# Patient Record
Sex: FEMALE | Race: WHITE | NOT HISPANIC OR LATINO | Employment: OTHER | URBAN - METROPOLITAN AREA
[De-identification: names, ages, dates, MRNs, and addresses within clinical notes are randomized per-mention and may not be internally consistent; named-entity substitution may affect disease eponyms.]

---

## 2017-02-03 RX ORDER — HYDROCHLOROTHIAZIDE 12.5 MG/1
CAPSULE, GELATIN COATED ORAL
Qty: 90 CAPSULE | Refills: 0 | Status: SHIPPED | OUTPATIENT
Start: 2017-02-03 | End: 2017-05-02 | Stop reason: SDUPTHER

## 2017-02-24 RX ORDER — LEVOTHYROXINE SODIUM 0.07 MG/1
TABLET ORAL
Qty: 90 TABLET | Refills: 0 | Status: SHIPPED | OUTPATIENT
Start: 2017-02-24 | End: 2017-05-23 | Stop reason: SDUPTHER

## 2017-05-02 RX ORDER — HYDROCHLOROTHIAZIDE 12.5 MG/1
CAPSULE, GELATIN COATED ORAL
Qty: 90 CAPSULE | Refills: 1 | Status: SHIPPED | OUTPATIENT
Start: 2017-05-02 | End: 2017-10-29 | Stop reason: SDUPTHER

## 2017-05-23 RX ORDER — LEVOTHYROXINE SODIUM 0.07 MG/1
TABLET ORAL
Qty: 90 TABLET | Refills: 0 | Status: SHIPPED | OUTPATIENT
Start: 2017-05-23 | End: 2017-08-21 | Stop reason: SDUPTHER

## 2017-08-21 RX ORDER — LEVOTHYROXINE SODIUM 0.07 MG/1
TABLET ORAL
Qty: 90 TABLET | Refills: 1 | Status: SHIPPED | OUTPATIENT
Start: 2017-08-21 | End: 2021-10-25 | Stop reason: SDUPTHER

## 2017-09-15 ENCOUNTER — CLINICAL SUPPORT (OUTPATIENT)
Dept: FAMILY MEDICINE CLINIC | Facility: CLINIC | Age: 77
End: 2017-09-15

## 2017-09-15 DIAGNOSIS — Z23 IMMUNIZATION DUE: Primary | ICD-10-CM

## 2017-09-15 PROCEDURE — 90715 TDAP VACCINE 7 YRS/> IM: CPT | Performed by: FAMILY MEDICINE

## 2017-09-15 PROCEDURE — 90471 IMMUNIZATION ADMIN: CPT | Performed by: FAMILY MEDICINE

## 2017-10-30 RX ORDER — HYDROCHLOROTHIAZIDE 12.5 MG/1
CAPSULE, GELATIN COATED ORAL
Qty: 90 CAPSULE | Refills: 1 | Status: SHIPPED | OUTPATIENT
Start: 2017-10-30 | End: 2019-05-01 | Stop reason: SDUPTHER

## 2018-02-18 RX ORDER — LEVOTHYROXINE SODIUM 0.07 MG/1
TABLET ORAL
Qty: 90 TABLET | Refills: 1 | OUTPATIENT
Start: 2018-02-18

## 2018-03-09 ENCOUNTER — TRANSCRIBE ORDERS (OUTPATIENT)
Dept: ADMINISTRATIVE | Facility: HOSPITAL | Age: 78
End: 2018-03-09

## 2018-03-09 DIAGNOSIS — E04.1 NONTOXIC UNINODULAR GOITER: Primary | ICD-10-CM

## 2018-03-13 ENCOUNTER — HOSPITAL ENCOUNTER (OUTPATIENT)
Dept: ULTRASOUND IMAGING | Facility: HOSPITAL | Age: 78
Discharge: HOME | End: 2018-03-13
Attending: OTOLARYNGOLOGY | Admitting: OTOLARYNGOLOGY

## 2018-03-13 ENCOUNTER — HOSPITAL ENCOUNTER (OUTPATIENT)
Dept: ULTRASOUND IMAGING | Facility: HOSPITAL | Age: 78
Discharge: HOME | End: 2018-03-13
Attending: OTOLARYNGOLOGY

## 2018-03-13 DIAGNOSIS — E04.1 NONTOXIC UNINODULAR GOITER: ICD-10-CM

## 2018-03-13 PROCEDURE — 76536 US EXAM OF HEAD AND NECK: CPT

## 2018-06-05 ENCOUNTER — OFFICE VISIT (OUTPATIENT)
Dept: FAMILY MEDICINE CLINIC | Facility: CLINIC | Age: 78
End: 2018-06-05

## 2018-06-05 VITALS
WEIGHT: 172 LBS | DIASTOLIC BLOOD PRESSURE: 68 MMHG | RESPIRATION RATE: 16 BRPM | OXYGEN SATURATION: 98 % | HEART RATE: 64 BPM | SYSTOLIC BLOOD PRESSURE: 120 MMHG | TEMPERATURE: 98.1 F | BODY MASS INDEX: 30.47 KG/M2

## 2018-06-05 DIAGNOSIS — N39.41 URGE INCONTINENCE: ICD-10-CM

## 2018-06-05 DIAGNOSIS — E03.9 HYPOTHYROIDISM, UNSPECIFIED TYPE: ICD-10-CM

## 2018-06-05 DIAGNOSIS — Z00.00 MEDICARE ANNUAL WELLNESS VISIT, SUBSEQUENT: Primary | ICD-10-CM

## 2018-06-05 PROCEDURE — G0439 PPPS, SUBSEQ VISIT: HCPCS | Performed by: FAMILY MEDICINE

## 2018-06-05 NOTE — PROGRESS NOTES
QUICK REFERENCE INFORMATION:  The ABCs of the Annual Wellness Visit    Subsequent Medicare Wellness Visit    HEALTH RISK ASSESSMENT    1940    Recent Hospitalizations:  No hospitalization(s) within the last year..        Current Medical Providers:  KATELYN Pederson MD - Family Practice      Smoking Status:  History   Smoking Status   • Former Smoker   Smokeless Tobacco   • Never Used       Alcohol Consumption:  History   Alcohol Use No       Depression Screen:   PHQ-2/PHQ-9 Depression Screening 6/5/2018   Little interest or pleasure in doing things 0   Feeling down, depressed, or hopeless 0   Total Score 0       Health Habits and Functional and Cognitive Screening:  Functional & Cognitive Status 6/5/2018   Do you have difficulty preparing food and eating? No   Do you have difficulty bathing yourself, getting dressed or grooming yourself? No   Do you have difficulty using the toilet? No   Do you have difficulty moving around from place to place? Yes   Do you have trouble with steps or getting out of a bed or a chair? No   In the past year have you fallen or experienced a near fall? Yes   Current Diet Well Balanced Diet   Dental Exam Up to date   Eye Exam Not up to date   Exercise (times per week) 3 times per week   Current Exercise Activities Include Cardiovasular Workout on Exercise Equipment   Do you need help using the phone?  No   Are you deaf or do you have serious difficulty hearing?  Yes   Do you need help with transportation? No   Do you need help shopping? No   Do you need help preparing meals?  No   Do you need help with housework?  No   Do you need help with laundry? No   Do you need help taking your medications? No   Do you need help managing money? No   Do you ever drive or ride in a car without wearing a seat belt? No           Does the patient have evidence of cognitive impairment? Yes    Aspirin use counseling: Taking ASA appropriately as indicated      Recent Lab Results:  CMP:  Lab Results    Component Value Date    BUN 27 (H) 06/30/2014    CREATININE 1.4 (H) 06/30/2014     06/30/2014    K 4.7 06/30/2014    CO2 30 06/30/2014    CALCIUM 10.2 06/30/2014    ALBUMIN 4.1 06/30/2014    BILITOT 0.4 06/30/2014    ALKPHOS 76 06/30/2014    AST 23 06/30/2014    ALT 13 06/30/2014     Lipid Panel:  Lab Results   Component Value Date    TRIG 110 06/02/2015    HDL 63 (H) 06/02/2015     HbA1c:       Visual Acuity:  No exam data present    Age-appropriate Screening Schedule:  Refer to the list below for future screening recommendations based on patient's age, sex and/or medical conditions. Orders for these recommended tests are listed in the plan section. The patient has been provided with a written plan.    Health Maintenance   Topic Date Due   • ZOSTER VACCINE (1 of 2) 07/26/1990   • PNEUMOCOCCAL VACCINES (65+ LOW/MEDIUM RISK) (1 of 2 - PCV13) 07/26/2005   • MAMMOGRAM  05/10/2016   • COLONOSCOPY  05/10/2016   • INFLUENZA VACCINE  08/01/2018   • TDAP/TD VACCINES (2 - Td) 09/15/2027        Subjective   History of Present Illness    Maya Anand is a 77 y.o. female who presents for an Subsequent Wellness Visit.    The following portions of the patient's history were reviewed and updated as appropriate: allergies, current medications, past family history, past medical history, past social history, past surgical history and problem list.    Outpatient Medications Prior to Visit   Medication Sig Dispense Refill   • DULoxetine (CYMBALTA) 30 MG capsule Take 1 capsule by mouth daily. 30 capsule 2   • hydrochlorothiazide (MICROZIDE) 12.5 MG capsule TAKE 1 CAPSULE DAILY 90 capsule 1   • levothyroxine (SYNTHROID, LEVOTHROID) 75 MCG tablet TAKE 1 TABLET DAILY AS DIRECTED (NEED APPOINTMENT) 90 tablet 1   • nitrofurantoin, macrocrystal-monohydrate, (MACROBID) 100 MG capsule Take 1 capsule by mouth 2 (Two) Times a Day. 14 capsule 0   • sulfamethoxazole-trimethoprim (BACTRIM DS,SEPTRA DS) 800-160 MG per tablet Take 1 tablet  by mouth 2 (two) times a day. 14 tablet 0     Facility-Administered Medications Prior to Visit   Medication Dose Route Frequency Provider Last Rate Last Dose   • gentamicin (GARAMYCIN) injection 40 mg  40 mg Intramuscular Q12H Josh Pederson MD   40 mg at 05/10/16 1049       There is no problem list on file for this patient.      Advance Care Planning:  has an advance directive - a copy HAS NOT been provided    Identification of Risk Factors:  Risk factors include: weight , increased fall risk, hearing limitations and incontinence.    Review of Systems   Constitutional: Negative.    HENT: Negative.    Eyes: Negative for visual disturbance.   Respiratory: Negative.    Cardiovascular: Negative.    Gastrointestinal: Negative.    Genitourinary: Positive for difficulty urinating.   Musculoskeletal: Negative for arthralgias and back pain.   Skin: Negative.    Neurological: Negative for dizziness and headaches.   Hematological: Negative.    Psychiatric/Behavioral: Negative.        Compared to one year ago, the patient feels her physical health is the same.  Compared to one year ago, the patient feels her mental health is the same.    Objective     Physical Exam   Constitutional: She appears well-developed and well-nourished.   Moves slowly   HENT:   Right Ear: External ear normal. Decreased hearing is noted.   Left Ear: External ear normal. Decreased hearing is noted.   Mouth/Throat: Oropharynx is clear and moist.   Eyes: EOM are normal.   Neck: Neck supple. Carotid bruit is not present. No thyromegaly present.   Cardiovascular: Normal heart sounds.    Pulmonary/Chest: Breath sounds normal.   Abdominal: Soft. Bowel sounds are normal.   Musculoskeletal: She exhibits no edema.   Lymphadenopathy:     She has no cervical adenopathy.   Neurological: She is alert.   Skin: Skin is warm.   Psychiatric: She has a normal mood and affect.   Vitals reviewed.      Vitals:    06/05/18 0847   BP: 120/68   Pulse: 64   Resp: 16    Temp: 98.1 °F (36.7 °C)   SpO2: 98%   Weight: 78 kg (172 lb)       Patient's Body mass index is 30.47 kg/m². BMI is above normal parameters. Recommendations include: educational material and exercise counseling.      Assessment/Plan   Patient Self-Management and Personalized Health Advice  The patient has been provided with information about: diet, exercise and fall prevention and preventive services including:   · Fall Risk assessment done.    Visit Diagnoses:    ICD-10-CM ICD-9-CM   1. Medicare annual wellness visit, subsequent Z00.00 V70.0   2. Hypothyroidism, unspecified type E03.9 244.9   3. Urge incontinence N39.41 788.31       No orders of the defined types were placed in this encounter.      Outpatient Encounter Prescriptions as of 6/5/2018   Medication Sig Dispense Refill   • DULoxetine (CYMBALTA) 30 MG capsule Take 1 capsule by mouth daily. 30 capsule 2   • hydrochlorothiazide (MICROZIDE) 12.5 MG capsule TAKE 1 CAPSULE DAILY 90 capsule 1   • levothyroxine (SYNTHROID, LEVOTHROID) 75 MCG tablet TAKE 1 TABLET DAILY AS DIRECTED (NEED APPOINTMENT) 90 tablet 1   • Mirabegron ER (MYRBETRIQ) 25 MG tablet sustained-release 24 hour 24 hr tablet Take 1 tablet by mouth Daily. 30 tablet 0   • [DISCONTINUED] nitrofurantoin, macrocrystal-monohydrate, (MACROBID) 100 MG capsule Take 1 capsule by mouth 2 (Two) Times a Day. 14 capsule 0   • [DISCONTINUED] sulfamethoxazole-trimethoprim (BACTRIM DS,SEPTRA DS) 800-160 MG per tablet Take 1 tablet by mouth 2 (two) times a day. 14 tablet 0     Facility-Administered Encounter Medications as of 6/5/2018   Medication Dose Route Frequency Provider Last Rate Last Dose   • [DISCONTINUED] gentamicin (GARAMYCIN) injection 40 mg  40 mg Intramuscular Q12H Josh Pederson MD   40 mg at 05/10/16 1049       Reviewed use of high risk medication in the elderly: yes  Reviewed for potential of harmful drug interactions in the elderly: yes    Follow Up:  Return for Annual.     An After  Visit Summary and PPPS with all of these plans were given to the patient.

## 2018-06-25 ENCOUNTER — TELEPHONE (OUTPATIENT)
Dept: FAMILY MEDICINE CLINIC | Facility: CLINIC | Age: 78
End: 2018-06-25

## 2018-06-25 DIAGNOSIS — N39.41 URGE INCONTINENCE: ICD-10-CM

## 2018-06-25 NOTE — TELEPHONE ENCOUNTER
----- Message from Jordyn Barragan MA sent at 6/25/2018  8:52 AM EDT -----      ----- Message -----  From: Molly Gil  Sent: 6/25/2018   8:50 AM  To: [facility] [facility]    PATIENT IS NEEDING A RX FOR Mirabegron ER (MYRBETRIQ) 25 MG tablet sustained-release 24 hour 24 hr tablet. WAS GIVEN SAMPLES BUT IS ALMOST OUT.     PLEASE SEND TO [facility] IN [place].     Rx has been sent into pharmacy.  BBvirginie, CMA

## 2018-06-27 DIAGNOSIS — N39.41 URGE INCONTINENCE: ICD-10-CM

## 2018-10-26 DIAGNOSIS — N39.41 URGE INCONTINENCE: ICD-10-CM

## 2018-10-26 RX ORDER — MIRABEGRON 25 MG/1
TABLET, FILM COATED, EXTENDED RELEASE ORAL
Qty: 30 TABLET | Refills: 3 | Status: SHIPPED | OUTPATIENT
Start: 2018-10-26 | End: 2018-10-30 | Stop reason: SDUPTHER

## 2018-10-30 ENCOUNTER — TELEPHONE (OUTPATIENT)
Dept: FAMILY MEDICINE CLINIC | Facility: CLINIC | Age: 78
End: 2018-10-30

## 2018-10-30 DIAGNOSIS — N39.41 URGE INCONTINENCE: ICD-10-CM

## 2018-10-30 NOTE — TELEPHONE ENCOUNTER
----- Message from Ngozi Duane sent at 10/30/2018  9:27 AM EDT -----  Regarding: PHARMACY MED CHANGE  Contact: [phone]  Patient called and said Dr Pederson called in a prescription for her yesterday for Myrbetriq 25mg, [facility].  Please call and let her know if this is possible @ [phone].  Thank you.     Rx has been resent to mail order pharmacy.  Brit, CMA

## 2018-11-15 ENCOUNTER — OFFICE VISIT (OUTPATIENT)
Dept: FAMILY MEDICINE CLINIC | Facility: CLINIC | Age: 78
End: 2018-11-15

## 2018-11-15 VITALS
SYSTOLIC BLOOD PRESSURE: 136 MMHG | HEART RATE: 76 BPM | OXYGEN SATURATION: 99 % | WEIGHT: 170 LBS | BODY MASS INDEX: 30.11 KG/M2 | TEMPERATURE: 98 F | RESPIRATION RATE: 16 BRPM | DIASTOLIC BLOOD PRESSURE: 70 MMHG

## 2018-11-15 DIAGNOSIS — F41.9 ANXIETY: Primary | ICD-10-CM

## 2018-11-15 PROCEDURE — 99213 OFFICE O/P EST LOW 20 MIN: CPT | Performed by: FAMILY MEDICINE

## 2018-11-15 RX ORDER — DIAZEPAM 5 MG/1
5 TABLET ORAL EVERY 8 HOURS PRN
Qty: 40 TABLET | Refills: 1 | Status: SHIPPED | OUTPATIENT
Start: 2018-11-15 | End: 2021-05-07

## 2018-11-15 NOTE — PROGRESS NOTES
Subjective   Maya Anand is a 78 y.o. female.     History of Present Illness   Did not like Cymbalta.  Preferred taking 's Valium to keep her calm, effects lasted one day.    No swelling, no cough or chest congestion. Rare heartburn helped with Pepto Bismol, brought on with excess eating.   Trouble swallowing, saw ENT and thyroid not enlarged on ultrasound.   The following portions of the patient's history were reviewed and updated as appropriate: allergies, current medications, past family history, past medical history, past social history, past surgical history and problem list.    Review of Systems   Constitutional: Negative for activity change.   HENT: Negative for congestion and sore throat.    Respiratory: Negative.    Cardiovascular: Negative.    Neurological: Negative.        Objective   Physical Exam   Constitutional: She appears well-developed.   Neck: No thyromegaly present.   Cardiovascular: Normal heart sounds.   Pulmonary/Chest: Breath sounds normal.   Lymphadenopathy:     She has no cervical adenopathy.   Neurological: She is alert.   Vitals reviewed.      Assessment/Plan   Maya was seen today for hypothyroidism and hypertension.    Diagnoses and all orders for this visit:    Anxiety  -     diazePAM (VALIUM) 5 MG tablet; Take 1 tablet by mouth Every 8 (Eight) Hours As Needed for Anxiety.

## 2019-05-01 RX ORDER — HYDROCHLOROTHIAZIDE 12.5 MG/1
12.5 CAPSULE, GELATIN COATED ORAL DAILY
Qty: 30 CAPSULE | Refills: 0 | Status: SHIPPED | OUTPATIENT
Start: 2019-05-01 | End: 2021-10-25

## 2021-01-21 ENCOUNTER — IMMUNIZATION (OUTPATIENT)
Dept: VACCINE CLINIC | Facility: HOSPITAL | Age: 81
End: 2021-01-21

## 2021-01-21 PROCEDURE — 0001A: CPT | Performed by: INTERNAL MEDICINE

## 2021-01-21 PROCEDURE — 91300 HC SARSCOV02 VAC 30MCG/0.3ML IM: CPT | Performed by: INTERNAL MEDICINE

## 2021-02-10 ENCOUNTER — IMMUNIZATION (OUTPATIENT)
Dept: VACCINE CLINIC | Facility: HOSPITAL | Age: 81
End: 2021-02-10

## 2021-02-10 PROCEDURE — 91300 HC SARSCOV02 VAC 30MCG/0.3ML IM: CPT | Performed by: INTERNAL MEDICINE

## 2021-02-10 PROCEDURE — 0002A: CPT | Performed by: INTERNAL MEDICINE

## 2021-03-20 PROCEDURE — 87077 CULTURE AEROBIC IDENTIFY: CPT | Performed by: FAMILY MEDICINE

## 2021-03-20 PROCEDURE — 87086 URINE CULTURE/COLONY COUNT: CPT | Performed by: FAMILY MEDICINE

## 2021-03-20 PROCEDURE — 87186 SC STD MICRODIL/AGAR DIL: CPT | Performed by: FAMILY MEDICINE

## 2021-03-23 ENCOUNTER — TELEPHONE (OUTPATIENT)
Dept: URGENT CARE | Facility: CLINIC | Age: 81
End: 2021-03-23

## 2021-03-24 ENCOUNTER — TELEPHONE (OUTPATIENT)
Dept: URGENT CARE | Facility: CLINIC | Age: 81
End: 2021-03-24

## 2021-03-24 NOTE — TELEPHONE ENCOUNTER
Informed patient of results.  Advised to finish medication and follow up with pcp if symptoms persist.  Feeling much better since visit.  FF 3/24/21

## 2021-05-07 ENCOUNTER — LAB (OUTPATIENT)
Dept: LAB | Facility: HOSPITAL | Age: 81
End: 2021-05-07

## 2021-05-07 ENCOUNTER — OFFICE VISIT (OUTPATIENT)
Dept: FAMILY MEDICINE CLINIC | Facility: CLINIC | Age: 81
End: 2021-05-07

## 2021-05-07 VITALS
DIASTOLIC BLOOD PRESSURE: 72 MMHG | SYSTOLIC BLOOD PRESSURE: 124 MMHG | BODY MASS INDEX: 28.19 KG/M2 | HEART RATE: 82 BPM | HEIGHT: 62 IN | OXYGEN SATURATION: 98 % | WEIGHT: 153.2 LBS

## 2021-05-07 DIAGNOSIS — I10 ESSENTIAL HYPERTENSION: ICD-10-CM

## 2021-05-07 DIAGNOSIS — R41.3 MEMORY LOSS: Primary | ICD-10-CM

## 2021-05-07 DIAGNOSIS — R41.3 MEMORY LOSS: ICD-10-CM

## 2021-05-07 DIAGNOSIS — E03.9 ACQUIRED HYPOTHYROIDISM: ICD-10-CM

## 2021-05-07 DIAGNOSIS — N39.41 URGE INCONTINENCE OF URINE: ICD-10-CM

## 2021-05-07 PROBLEM — M17.9 OSTEOARTHRITIS OF KNEE: Status: ACTIVE | Noted: 2021-05-07

## 2021-05-07 LAB
ALBUMIN SERPL-MCNC: 4 G/DL (ref 3.5–5.2)
ALBUMIN/GLOB SERPL: 1.4 G/DL
ALP SERPL-CCNC: 68 U/L (ref 39–117)
ALT SERPL W P-5'-P-CCNC: 16 U/L (ref 1–33)
ANION GAP SERPL CALCULATED.3IONS-SCNC: 9.1 MMOL/L (ref 5–15)
AST SERPL-CCNC: 29 U/L (ref 1–32)
BASOPHILS # BLD AUTO: 0.05 10*3/MM3 (ref 0–0.2)
BASOPHILS NFR BLD AUTO: 0.7 % (ref 0–1.5)
BILIRUB SERPL-MCNC: 0.4 MG/DL (ref 0–1.2)
BUN SERPL-MCNC: 29 MG/DL (ref 8–23)
BUN/CREAT SERPL: 18.6 (ref 7–25)
CALCIUM SPEC-SCNC: 11.2 MG/DL (ref 8.6–10.5)
CHLORIDE SERPL-SCNC: 101 MMOL/L (ref 98–107)
CO2 SERPL-SCNC: 26.9 MMOL/L (ref 22–29)
CREAT SERPL-MCNC: 1.56 MG/DL (ref 0.57–1)
DEPRECATED RDW RBC AUTO: 43.8 FL (ref 37–54)
EOSINOPHIL # BLD AUTO: 0.14 10*3/MM3 (ref 0–0.4)
EOSINOPHIL NFR BLD AUTO: 1.8 % (ref 0.3–6.2)
ERYTHROCYTE [DISTWIDTH] IN BLOOD BY AUTOMATED COUNT: 13.2 % (ref 12.3–15.4)
GFR SERPL CREATININE-BSD FRML MDRD: 32 ML/MIN/1.73
GLOBULIN UR ELPH-MCNC: 2.8 GM/DL
GLUCOSE SERPL-MCNC: 92 MG/DL (ref 65–99)
HCT VFR BLD AUTO: 35.6 % (ref 34–46.6)
HGB BLD-MCNC: 11.2 G/DL (ref 12–15.9)
IMM GRANULOCYTES # BLD AUTO: 0.03 10*3/MM3 (ref 0–0.05)
IMM GRANULOCYTES NFR BLD AUTO: 0.4 % (ref 0–0.5)
LYMPHOCYTES # BLD AUTO: 1.55 10*3/MM3 (ref 0.7–3.1)
LYMPHOCYTES NFR BLD AUTO: 20.4 % (ref 19.6–45.3)
MCH RBC QN AUTO: 28.7 PG (ref 26.6–33)
MCHC RBC AUTO-ENTMCNC: 31.5 G/DL (ref 31.5–35.7)
MCV RBC AUTO: 91.3 FL (ref 79–97)
MONOCYTES # BLD AUTO: 0.67 10*3/MM3 (ref 0.1–0.9)
MONOCYTES NFR BLD AUTO: 8.8 % (ref 5–12)
NEUTROPHILS NFR BLD AUTO: 5.17 10*3/MM3 (ref 1.7–7)
NEUTROPHILS NFR BLD AUTO: 67.9 % (ref 42.7–76)
NRBC BLD AUTO-RTO: 0 /100 WBC (ref 0–0.2)
PLATELET # BLD AUTO: 275 10*3/MM3 (ref 140–450)
PMV BLD AUTO: 11.1 FL (ref 6–12)
POTASSIUM SERPL-SCNC: 4.3 MMOL/L (ref 3.5–5.2)
PROT SERPL-MCNC: 6.8 G/DL (ref 6–8.5)
RBC # BLD AUTO: 3.9 10*6/MM3 (ref 3.77–5.28)
SODIUM SERPL-SCNC: 137 MMOL/L (ref 136–145)
T4 FREE SERPL-MCNC: 1.55 NG/DL (ref 0.93–1.7)
TSH SERPL DL<=0.05 MIU/L-ACNC: 0.85 UIU/ML (ref 0.27–4.2)
VIT B12 BLD-MCNC: 1098 PG/ML (ref 211–946)
WBC # BLD AUTO: 7.61 10*3/MM3 (ref 3.4–10.8)

## 2021-05-07 PROCEDURE — 99214 OFFICE O/P EST MOD 30 MIN: CPT | Performed by: FAMILY MEDICINE

## 2021-05-07 PROCEDURE — 85025 COMPLETE CBC W/AUTO DIFF WBC: CPT

## 2021-05-07 PROCEDURE — 84443 ASSAY THYROID STIM HORMONE: CPT

## 2021-05-07 PROCEDURE — 84439 ASSAY OF FREE THYROXINE: CPT

## 2021-05-07 PROCEDURE — 82607 VITAMIN B-12: CPT

## 2021-05-07 PROCEDURE — 80053 COMPREHEN METABOLIC PANEL: CPT

## 2021-05-07 RX ORDER — MULTIPLE VITAMINS W/ MINERALS TAB 9MG-400MCG
1 TAB ORAL DAILY
COMMUNITY
End: 2021-10-25

## 2021-05-07 RX ORDER — ACYCLOVIR 400 MG/1
400 TABLET ORAL
COMMUNITY
End: 2021-10-25 | Stop reason: SDUPTHER

## 2021-05-07 RX ORDER — MULTIPLE VITAMINS W/ MINERALS TAB 9MG-400MCG
1 TAB ORAL DAILY
COMMUNITY
End: 2021-07-07

## 2021-05-07 RX ORDER — ESCITALOPRAM OXALATE 5 MG/1
5 TABLET ORAL DAILY
COMMUNITY
End: 2021-10-25 | Stop reason: SDUPTHER

## 2021-05-07 NOTE — PROGRESS NOTES
"Chief Complaint  Establish Care and Memory Loss (daughter states that she is concerned about memory)    Subjective          Maya Anand presents to [facility]  History of Present Illness     Daughter also present at appointment.     Patient has no concerns, \"I'm good.\" She is slowing down. She has to be careful walking. She has to take Synthroid every day. She has had thyroid problems for a long time, as long as daughter can remember. All of her siblings have it and daughters both have thyroid problems.     Daughter is concerned about her memory. She takes prevagen daily. She visited her in [place]. She couldn't remember how to turn coffee pot on. Paying bill at dinner, used credit card, couldn't add her tip. Her balance at beach one day standing in waves had a hard time standing up. She has a hard time finding word she wants to say, or skips over a word. 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 Sister had hydrocephalus.     She just got over UTI about a month ago.     She gets swollen ankles.     Once a month acid reflux and takes TUMS.         Objective   Vital Signs:   /72 (BP Location: Left arm, Patient Position: Sitting, Cuff Size: Adult)   Pulse 82   Ht 157.5 cm (62\")   Wt 69.5 kg (153 lb 3.2 oz)   SpO2 98%   BMI 28.02 kg/m²     Physical Exam  Vitals reviewed.   Constitutional:       General: She is not in acute distress.     Appearance: She is obese. She is not ill-appearing.   Neck:      Thyroid: No thyroid mass, thyromegaly or thyroid tenderness.   Cardiovascular:      Rate and Rhythm: Normal rate and regular rhythm.   Pulmonary:      Effort: Pulmonary effort is normal.      Breath sounds: Normal breath sounds.   Musculoskeletal:      Right lower leg: No edema.      Left lower leg: No edema.   Neurological:      Mental Status: She is alert.   Psychiatric:         Mood and Affect: Mood normal.         Behavior: Behavior " normal.         Thought Content: Thought content normal.         Cognition and Memory: She exhibits impaired recent memory.         Judgment: Judgment normal.        Result Review :               Prior PCP records requested.  Assessment and Plan    Diagnoses and all orders for this visit:    1. Memory loss (Primary)  -     TSH; Future  -     T4, free; Future  -     CBC Auto Differential; Future  -     Comprehensive Metabolic Panel; Future  -     Vitamin B12; Future  -     MRI Brain Without Contrast; Future  -     Ambulatory Referral to Neurology  New.  MMSE 27 out of 30.  She had difficulty with naming clipboard and stethoscope.  She also was unable to complete 3 word recall.  Check labs and imaging and refer to memory clinic for further evaluation, testing, and management.  2. Essential hypertension  -     Comprehensive Metabolic Panel; Future  Controlled with hydrochlorothiazide.  Discussed occasional edema would recommend elevating legs.  If severe 2-3+ edema needs evaluation.  3. Urge incontinence of urine  Has been under the care of urology.  Continue Myrbetriq.  4. Acquired hypothyroidism  -     TSH; Future  -     T4, free; Future  Check thyroid function today and adjust levothyroxine if needed.      Follow Up   Return in about 4 months (around 9/1/2021) for Office visit chronic care.  Patient was given instructions and counseling regarding her condition or for health maintenance advice. Please see specific information pulled into the AVS if appropriate.     Electronically signed by Ysabel Stewart MD, 05/07/21, 11:23 AM EDT.

## 2021-05-10 ENCOUNTER — TELEPHONE (OUTPATIENT)
Dept: FAMILY MEDICINE CLINIC | Facility: CLINIC | Age: 81
End: 2021-05-10

## 2021-05-10 DIAGNOSIS — N18.9 CHRONIC KIDNEY DISEASE, UNSPECIFIED CKD STAGE: Primary | ICD-10-CM

## 2021-05-10 NOTE — TELEPHONE ENCOUNTER
Attempted to contact patient, she is unable to hear over the phone. I relayed message to her . He alerted his wife and he states that she does not currently have a nephrologist. They are requesting a referral to be placed.

## 2021-05-10 NOTE — TELEPHONE ENCOUNTER
Please call about abnormal result.  Letter will also be mailed.      The test results show normal thyroid function.  Mild anemia.  Kidney function is severely reduced.  Normal B12 level.  Do you see a kidney specialist yet?  If you are not under the care of nephrology I recommend consultation and I can place a referral.

## 2021-05-11 ENCOUNTER — TELEPHONE (OUTPATIENT)
Dept: FAMILY MEDICINE CLINIC | Facility: CLINIC | Age: 81
End: 2021-05-11

## 2021-05-11 RX ORDER — HYDROXYZINE PAMOATE 25 MG/1
25-50 CAPSULE ORAL EVERY 4 HOURS PRN
Qty: 10 CAPSULE | Refills: 0 | Status: SHIPPED | OUTPATIENT
Start: 2021-05-11 | End: 2021-10-25

## 2021-05-11 NOTE — TELEPHONE ENCOUNTER
Pts daughter called asking if there was any type of medication that could be proscribed to her mother to help with anxiety and claustrophobia due to her MRI scan. She said that her mother is very worried about being in an MRI machine.

## 2021-06-07 ENCOUNTER — HOSPITAL ENCOUNTER (OUTPATIENT)
Dept: MRI IMAGING | Facility: HOSPITAL | Age: 81
Discharge: HOME | End: 2021-06-07
Admitting: FAMILY MEDICINE

## 2021-06-07 DIAGNOSIS — R41.3 MEMORY LOSS: ICD-10-CM

## 2021-06-07 PROCEDURE — 70551 MRI BRAIN STEM W/O DYE: CPT

## 2021-07-07 ENCOUNTER — OFFICE VISIT (OUTPATIENT)
Dept: NEUROLOGY | Facility: CLINIC | Age: 81
End: 2021-07-07

## 2021-07-07 VITALS
SYSTOLIC BLOOD PRESSURE: 142 MMHG | BODY MASS INDEX: 27.23 KG/M2 | OXYGEN SATURATION: 96 % | HEIGHT: 62 IN | HEART RATE: 75 BPM | DIASTOLIC BLOOD PRESSURE: 70 MMHG | TEMPERATURE: 97.3 F | WEIGHT: 148 LBS

## 2021-07-07 DIAGNOSIS — G31.84 MILD COGNITIVE IMPAIRMENT: Primary | ICD-10-CM

## 2021-07-07 PROCEDURE — 99204 OFFICE O/P NEW MOD 45 MIN: CPT | Performed by: PHYSICIAN ASSISTANT

## 2021-07-07 RX ORDER — AMLODIPINE BESYLATE 2.5 MG/1
2.5 TABLET ORAL DAILY
COMMUNITY
Start: 2021-06-11 | End: 2021-10-25 | Stop reason: DRUGHIGH

## 2021-07-07 RX ORDER — FUROSEMIDE 20 MG/1
20 TABLET ORAL DAILY PRN
COMMUNITY
Start: 2021-06-11 | End: 2021-10-25

## 2021-07-07 RX ORDER — DONEPEZIL HYDROCHLORIDE 5 MG/1
5 TABLET, FILM COATED ORAL DAILY
Qty: 30 TABLET | Refills: 11 | Status: SHIPPED | OUTPATIENT
Start: 2021-07-07 | End: 2022-01-26

## 2021-07-13 DIAGNOSIS — R26.89 IMPAIRMENT OF BALANCE: Primary | ICD-10-CM

## 2021-07-22 ENCOUNTER — TRANSCRIBE ORDERS (OUTPATIENT)
Dept: ADMINISTRATIVE | Facility: HOSPITAL | Age: 81
End: 2021-07-22

## 2021-07-22 DIAGNOSIS — N25.81 HYPERPARATHYROIDISM, SECONDARY (HCC): Primary | ICD-10-CM

## 2021-08-12 PROCEDURE — 87186 SC STD MICRODIL/AGAR DIL: CPT | Performed by: NURSE PRACTITIONER

## 2021-08-12 PROCEDURE — 87086 URINE CULTURE/COLONY COUNT: CPT | Performed by: NURSE PRACTITIONER

## 2021-08-12 PROCEDURE — 87077 CULTURE AEROBIC IDENTIFY: CPT | Performed by: NURSE PRACTITIONER

## 2021-08-13 ENCOUNTER — TELEPHONE (OUTPATIENT)
Dept: URGENT CARE | Facility: CLINIC | Age: 81
End: 2021-08-13

## 2021-08-14 ENCOUNTER — TELEPHONE (OUTPATIENT)
Dept: URGENT CARE | Facility: CLINIC | Age: 81
End: 2021-08-14

## 2021-10-25 ENCOUNTER — OFFICE VISIT (OUTPATIENT)
Dept: FAMILY MEDICINE CLINIC | Facility: CLINIC | Age: 81
End: 2021-10-25

## 2021-10-25 ENCOUNTER — HOME HEALTH ADMISSION (OUTPATIENT)
Dept: HOME HEALTH SERVICES | Facility: HOME HEALTHCARE | Age: 81
End: 2021-10-25

## 2021-10-25 VITALS
HEIGHT: 62 IN | DIASTOLIC BLOOD PRESSURE: 86 MMHG | HEART RATE: 67 BPM | OXYGEN SATURATION: 98 % | WEIGHT: 146.2 LBS | SYSTOLIC BLOOD PRESSURE: 158 MMHG | BODY MASS INDEX: 26.91 KG/M2

## 2021-10-25 DIAGNOSIS — M25.562 ACUTE PAIN OF LEFT KNEE: ICD-10-CM

## 2021-10-25 DIAGNOSIS — R26.9 ABNORMAL GAIT DUE TO MUSCLE WEAKNESS: ICD-10-CM

## 2021-10-25 DIAGNOSIS — F33.41 MAJOR DEPRESSIVE DISORDER, RECURRENT EPISODE, IN PARTIAL REMISSION (HCC): ICD-10-CM

## 2021-10-25 DIAGNOSIS — E03.9 ACQUIRED HYPOTHYROIDISM: ICD-10-CM

## 2021-10-25 DIAGNOSIS — N39.41 URGE INCONTINENCE OF URINE: ICD-10-CM

## 2021-10-25 DIAGNOSIS — B00.1 HERPES LABIALIS: ICD-10-CM

## 2021-10-25 DIAGNOSIS — M62.81 ABNORMAL GAIT DUE TO MUSCLE WEAKNESS: ICD-10-CM

## 2021-10-25 DIAGNOSIS — E21.3 HYPERPARATHYROIDISM (HCC): ICD-10-CM

## 2021-10-25 DIAGNOSIS — F41.1 GAD (GENERALIZED ANXIETY DISORDER): ICD-10-CM
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
## 2021-10-25 DIAGNOSIS — I10 ESSENTIAL HYPERTENSION: Primary | ICD-10-CM

## 2021-10-25 DIAGNOSIS — N39.41 URGE INCONTINENCE: ICD-10-CM

## 2021-10-25 PROBLEM — H35.3132 INTERMEDIATE STAGE NONEXUDATIVE AGE-RELATED MACULAR DEGENERATION OF BOTH EYES: Status: ACTIVE | Noted: 2018-01-19

## 2021-10-25 PROBLEM — H04.123 DRY EYES, BILATERAL: Status: ACTIVE | Noted: 2018-08-01

## 2021-10-25 PROBLEM — H40.003 GLAUCOMA SUSPECT OF BOTH EYES: Status: ACTIVE | Noted: 2020-01-06

## 2021-10-25 PROBLEM — H52.203 ASTIGMATISM OF BOTH EYES: Status: ACTIVE | Noted: 2018-08-01

## 2021-10-25 PROBLEM — H52.4 BILATERAL PRESBYOPIA: Status: ACTIVE | Noted: 2018-08-01

## 2021-10-25 PROBLEM — H52.13 BILATERAL MYOPIA: Status: ACTIVE | Noted: 2018-08-01

## 2021-10-25 PROBLEM — H40.051 BORDERLINE GLAUCOMA OF RIGHT EYE WITH OCULAR HYPERTENSION: Status: ACTIVE | Noted: 2018-02-12

## 2021-10-25 PROBLEM — H26.492 PCO (POSTERIOR CAPSULAR OPACIFICATION), LEFT: Status: ACTIVE | Noted: 2021-09-13

## 2021-10-25 PROCEDURE — 99214 OFFICE O/P EST MOD 30 MIN: CPT | Performed by: FAMILY MEDICINE

## 2021-10-25 RX ORDER — AMLODIPINE BESYLATE 5 MG/1
5 TABLET ORAL DAILY
Qty: 90 TABLET | Refills: 1 | Status: SHIPPED | OUTPATIENT
Start: 2021-10-25 | End: 2022-04-18 | Stop reason: SDUPTHER

## 2021-10-25 RX ORDER — NITROFURANTOIN 25; 75 MG/1; MG/1
CAPSULE ORAL
COMMUNITY
Start: 2021-10-22 | End: 2022-07-11

## 2021-10-25 RX ORDER — ESCITALOPRAM OXALATE 5 MG/1
5 TABLET ORAL EVERY MORNING
Qty: 90 TABLET | Refills: 3 | Status: SHIPPED | OUTPATIENT
Start: 2021-10-25 | End: 2021-12-06 | Stop reason: DRUGHIGH

## 2021-10-25 RX ORDER — LEVOTHYROXINE SODIUM 0.07 MG/1
75 TABLET ORAL
Qty: 90 TABLET | Refills: 3 | Status: SHIPPED | OUTPATIENT
Start: 2021-10-25 | End: 2022-10-20

## 2021-10-25 RX ORDER — CEFDINIR 300 MG/1
1 CAPSULE ORAL 2 TIMES DAILY
COMMUNITY
Start: 2021-08-12 | End: 2021-12-06

## 2021-10-25 RX ORDER — ACYCLOVIR 400 MG/1
400 TABLET ORAL 3 TIMES DAILY
Qty: 270 TABLET | Refills: 3 | Status: SHIPPED | OUTPATIENT
Start: 2021-10-25 | End: 2022-12-08

## 2021-10-25 NOTE — PROGRESS NOTES
"Chief Complaint  Hypothyroidism (Pt  states that the pt has already had flu vaccine. ), Hypertension (Pt was at [facility] ED this past weekend for UTI, HTN, and a fall. ), Medication Problem (Pt daughter states that she would like to discuss medications today. ), and Difficulty Walking (Pt daughter would like to see about getting wheelchaird due to pt have trouble walking. )    Subjective          Maya Anand presents to [facility]  History of Present Illness      and daughter are also present at appointment.     Fall occurred 10/20/21 at home. Moved everything to the firtst floor. Recliner slipped out from under her when sitting down onto the floor.  couldn't help her from the floor, had to call family. 10/22/21 she couldn't get up with strength to lift off commode. Took to outside ED. Left knee xray w/o fracture. She had UTI. BP declan high 196. Daughter reviewed she wasn't taking her medications for a long time. Daughter is now taking over medications for past 3 days. Want to consolidate prescriptions and mail order for compliance. She was taken of HCTZ by nephrologist because of calcium. She hasn't taken lasix. She has had bad fever blisters for a couple months, she was taking acyclovir intermittently, now 3 times a day. Triggers are tomato. New this morning after eating ribs. She was taking lexapro as needed, now daily for anxiety/depression. She stopped donepezil. She was kind of taking levothyroxine. Frequent urination, wears pads. Nocturia x1. Difficulty leaving the home. She went to [facility] and had 5 weeks recommended by neurology.   She has already had flu vaccine. She will be switching nephrology to .   She didn't start medication for hyperparathyroid, she hasn't scan.     Objective   Vital Signs:   /86   Pulse 67   Ht 157.5 cm (62.01\")   Wt 66.3 kg (146 lb 3.2 oz)   SpO2 98%   BMI 26.73 kg/m²     Physical Exam  Vitals reviewed.   Constitutional: "       General: She is not in acute distress.     Appearance: She is not ill-appearing.   Cardiovascular:      Rate and Rhythm: Normal rate and regular rhythm.   Pulmonary:      Effort: Pulmonary effort is normal.      Breath sounds: Normal breath sounds.   Musculoskeletal:      Left knee: No swelling, deformity, effusion, erythema or ecchymosis. No tenderness.      Comments: Slow gait with walker.   Neurological:      Mental Status: She is alert.        Result Review :   The following data was reviewed by: Ysabel Stewart MD on 10/25/2021:             TSH (05/07/2021 11:27)  T4, free (05/07/2021 11:27)    Outside [place] emergency room note, x-ray, labs requested.    Assessment and Plan    Diagnoses and all orders for this visit:    1. Essential hypertension (Primary)  -     amLODIPine (NORVASC) 5 MG tablet; Take 1 tablet by mouth Daily.  Dispense: 90 tablet; Refill: 1  Uncontrolled.  Increase amlodipine 5 mg.  Recheck in 4 to 6 weeks.  2. Herpes labialis  -     acyclovir (ZOVIRAX) 400 MG tablet; Take 1 tablet by mouth 3 (Three) Times a Day.  Dispense: 270 tablet; Refill: 3  Continue suppression with acyclovir.  3. JOSE (generalized anxiety disorder)  -     escitalopram (LEXAPRO) 5 MG tablet; Take 1 tablet by mouth Every Morning.  Dispense: 90 tablet; Refill: 3  Continue Lexapro.  4. Major depressive disorder, recurrent episode, in partial remission (HCC)  -     escitalopram (LEXAPRO) 5 MG tablet; Take 1 tablet by mouth Every Morning.  Dispense: 90 tablet; Refill: 3  Continue Lexapro.  5. Acquired hypothyroidism  -     levothyroxine (SYNTHROID, LEVOTHROID) 75 MCG tablet; Take 1 tablet by mouth Every Morning.  Dispense: 90 tablet; Refill: 3  Most recent thyroid function in range continue levothyroxine.  6. Urge incontinence of urine    7. Urge incontinence  -     Mirabegron ER (Myrbetriq) 25 MG tablet sustained-release 24 hour 24 hr tablet; Take 1 tablet by mouth Daily.  Dispense: 90 tablet; Refill:  3    8. Abnormal gait due to muscle weakness  -     Ambulatory Referral to Home Health  -     Lightweight Wheelchair    9. Fall, subsequent encounter  -     Ambulatory Referral to Home Health  -     Lightweight Wheelchair    10. Acute pain of left knee  -     Ambulatory Referral to Home Health  -     Lightweight Wheelchair  Recommend in-home physical therapy as she has difficulty leaving her home.  Obtain emergency room records including x-rays which were reportedly normal.  11. Hyperparathyroidism (HCC)  Family is seeking second opinion with  nephrology.  She is no longer on hydrochlorothiazide.      Follow Up   Return in about 6 weeks (around 12/6/2021) for Office visit HTN, knee pain .  Patient was given instructions and counseling regarding her condition or for health maintenance advice. Please see specific information pulled into the AVS if appropriate.   Electronically signed by Ysabel Stewart MD, 10/25/21, 10:40 AM EDT.

## 2021-10-26 ENCOUNTER — HOME HEALTH ADMISSION (OUTPATIENT)
Dept: HOME HEALTH SERVICES | Facility: HOME HEALTHCARE | Age: 81
End: 2021-10-26

## 2021-10-26 DIAGNOSIS — I10 ESSENTIAL HYPERTENSION: Primary | ICD-10-CM

## 2021-10-26 DIAGNOSIS — R41.3 MEMORY LOSS: ICD-10-CM

## 2021-10-28 ENCOUNTER — HOME CARE VISIT (OUTPATIENT)
Dept: HOME HEALTH SERVICES | Facility: HOME HEALTHCARE | Age: 81
End: 2021-10-28

## 2021-10-28 VITALS
OXYGEN SATURATION: 97 % | TEMPERATURE: 98.1 F | DIASTOLIC BLOOD PRESSURE: 62 MMHG | SYSTOLIC BLOOD PRESSURE: 147 MMHG | HEART RATE: 72 BPM | RESPIRATION RATE: 16 BRPM

## 2021-10-28 PROCEDURE — G0299 HHS/HOSPICE OF RN EA 15 MIN: HCPCS

## 2021-11-03 ENCOUNTER — HOME CARE VISIT (OUTPATIENT)
Dept: HOME HEALTH SERVICES | Facility: HOME HEALTHCARE | Age: 81
End: 2021-11-03

## 2021-11-03 VITALS
HEART RATE: 74 BPM | DIASTOLIC BLOOD PRESSURE: 75 MMHG | OXYGEN SATURATION: 93 % | RESPIRATION RATE: 16 BRPM | SYSTOLIC BLOOD PRESSURE: 138 MMHG

## 2021-11-03 PROCEDURE — G0299 HHS/HOSPICE OF RN EA 15 MIN: HCPCS

## 2021-11-10 ENCOUNTER — HOME CARE VISIT (OUTPATIENT)
Dept: HOME HEALTH SERVICES | Facility: HOME HEALTHCARE | Age: 81
End: 2021-11-10

## 2021-11-10 VITALS
OXYGEN SATURATION: 93 % | SYSTOLIC BLOOD PRESSURE: 142 MMHG | TEMPERATURE: 97.7 F | RESPIRATION RATE: 16 BRPM | HEART RATE: 74 BPM | DIASTOLIC BLOOD PRESSURE: 62 MMHG

## 2021-11-10 PROCEDURE — G0299 HHS/HOSPICE OF RN EA 15 MIN: HCPCS

## 2021-11-16 ENCOUNTER — TRANSCRIBE ORDERS (OUTPATIENT)
Dept: ADMINISTRATIVE | Facility: HOSPITAL | Age: 81
End: 2021-11-16

## 2021-11-16 DIAGNOSIS — E21.0 PRIMARY HYPERPARATHYROIDISM (HCC): ICD-10-CM

## 2021-11-16 DIAGNOSIS — E83.52 HYPERCALCEMIA: Primary | ICD-10-CM

## 2021-11-17 ENCOUNTER — HOME CARE VISIT (OUTPATIENT)
Dept: HOME HEALTH SERVICES | Facility: HOME HEALTHCARE | Age: 81
End: 2021-11-17

## 2021-11-17 VITALS
HEART RATE: 68 BPM | TEMPERATURE: 97.7 F | DIASTOLIC BLOOD PRESSURE: 66 MMHG | SYSTOLIC BLOOD PRESSURE: 133 MMHG | RESPIRATION RATE: 18 BRPM | OXYGEN SATURATION: 98 %

## 2021-11-17 PROCEDURE — G0299 HHS/HOSPICE OF RN EA 15 MIN: HCPCS

## 2021-12-06 ENCOUNTER — OFFICE VISIT (OUTPATIENT)
Dept: FAMILY MEDICINE CLINIC | Facility: CLINIC | Age: 81
End: 2021-12-06

## 2021-12-06 VITALS
WEIGHT: 144.2 LBS | TEMPERATURE: 97.8 F | OXYGEN SATURATION: 98 % | BODY MASS INDEX: 26.54 KG/M2 | SYSTOLIC BLOOD PRESSURE: 128 MMHG | HEIGHT: 62 IN | HEART RATE: 72 BPM | DIASTOLIC BLOOD PRESSURE: 76 MMHG

## 2021-12-06 DIAGNOSIS — Z12.31 VISIT FOR SCREENING MAMMOGRAM: ICD-10-CM

## 2021-12-06 DIAGNOSIS — Z00.00 MEDICARE ANNUAL WELLNESS VISIT, SUBSEQUENT: Primary | ICD-10-CM

## 2021-12-06 DIAGNOSIS — R41.3 MEMORY LOSS: ICD-10-CM

## 2021-12-06 DIAGNOSIS — E21.3 HYPERPARATHYROIDISM (HCC): ICD-10-CM

## 2021-12-06 DIAGNOSIS — F41.1 GAD (GENERALIZED ANXIETY DISORDER): ICD-10-CM

## 2021-12-06 DIAGNOSIS — F32.5 MAJOR DEPRESSION IN REMISSION (HCC): ICD-10-CM

## 2021-12-06 DIAGNOSIS — Z23 IMMUNIZATION DUE: ICD-10-CM

## 2021-12-06 DIAGNOSIS — E03.9 ACQUIRED HYPOTHYROIDISM: ICD-10-CM

## 2021-12-06 DIAGNOSIS — B00.1 HERPES LABIALIS: ICD-10-CM

## 2021-12-06 DIAGNOSIS — I10 ESSENTIAL HYPERTENSION: Chronic | ICD-10-CM

## 2021-12-06 DIAGNOSIS — N39.41 URGE INCONTINENCE OF URINE: ICD-10-CM

## 2021-12-06 PROCEDURE — 1160F RVW MEDS BY RX/DR IN RCRD: CPT | Performed by: FAMILY MEDICINE

## 2021-12-06 PROCEDURE — G0439 PPPS, SUBSEQ VISIT: HCPCS | Performed by: FAMILY MEDICINE

## 2021-12-06 PROCEDURE — 99397 PER PM REEVAL EST PAT 65+ YR: CPT | Performed by: FAMILY MEDICINE

## 2021-12-06 PROCEDURE — 1126F AMNT PAIN NOTED NONE PRSNT: CPT | Performed by: FAMILY MEDICINE

## 2021-12-06 PROCEDURE — 1170F FXNL STATUS ASSESSED: CPT | Performed by: FAMILY MEDICINE

## 2021-12-06 RX ORDER — PHENAZOPYRIDINE HYDROCHLORIDE 200 MG/1
200 TABLET, FILM COATED ORAL DAILY
COMMUNITY
Start: 2021-08-12 | End: 2021-12-06

## 2021-12-06 RX ORDER — ESCITALOPRAM OXALATE 10 MG/1
10 TABLET ORAL EVERY MORNING
Qty: 90 TABLET | Refills: 3 | Status: SHIPPED | OUTPATIENT
Start: 2021-12-06 | End: 2022-09-08 | Stop reason: SDUPTHER

## 2021-12-06 NOTE — PROGRESS NOTES
The ABCs of the Annual Wellness Visit  Subsequent Medicare Wellness Visit    Chief Complaint   Patient presents with   • Medicare Wellness-subsequent     Pt states she has already had flu vacccine, will call [employer] and get dates.   • Anxiety     Pt daughter would like to talk about medications   • Urinary Frequency     Pt daughter would like to talk about medications   • Annual Exam      Subjective    History of Present Illness:  Maya Anand is a 81 y.o. female who presents for a Subsequent Medicare Wellness Visit.      Daughter Gabriella present at appointment.       She saw [facility]. Parathyroid scan scheduled this week.     Lips breakout with ketchup. Avoid acidic foods.     Sometimes notices mad at the dog. Daughter concerned about lexapro dose change.     Urinating about every 4 hours, up 2 times a night. She doesn't go as much during the day.         The following portions of the patient's history were reviewed and   updated as appropriate: allergies, current medications, past family history, past medical history, past social history, past surgical history and problem list.    Compared to one year ago, the patient feels her physical   health is worse.    Compared to one year ago, the patient feels her mental   health is worse.    Recent Hospitalizations:  She was not admitted to the hospital during the last year.       Current Medical Providers:  Patient Care Team:  Ysabel oWng MD as PCP - General (Family Medicine)  Jabari Gallegos MD as Consulting Physician (Nephrology)    Outpatient Medications Prior to Visit   Medication Sig Dispense Refill   • acyclovir (ZOVIRAX) 400 MG tablet Take 1 tablet by mouth 3 (Three) Times a Day. 270 tablet 3   • amLODIPine (NORVASC) 5 MG tablet Take 1 tablet by mouth Daily. 90 tablet 1   • Apoaequorin (Prevagen) 10 MG capsule Take  by mouth.     • cyanocobalamin (VITAMIN B-12) 500 MCG tablet Take 500 mcg by mouth Daily.     • donepezil  (ARICEPT) 5 MG tablet Take 1 tablet by mouth Daily. 30 tablet 11   • levothyroxine (SYNTHROID, LEVOTHROID) 75 MCG tablet Take 1 tablet by mouth Every Morning. 90 tablet 3   • Mirabegron ER (Myrbetriq) 25 MG tablet sustained-release 24 hour 24 hr tablet Take 1 tablet by mouth Daily. 90 tablet 3   • nitrofurantoin, macrocrystal-monohydrate, (MACROBID) 100 MG capsule      • escitalopram (LEXAPRO) 5 MG tablet Take 1 tablet by mouth Every Morning. 90 tablet 3   • cefdinir (OMNICEF) 300 MG capsule Take 1 capsule by mouth 2 (Two) Times a Day.     • phenazopyridine (PYRIDIUM) 200 MG tablet Take 200 mg by mouth Daily.       No facility-administered medications prior to visit.       No opioid medication identified on active medication list. I have reviewed chart for other potential  high risk medication/s and harmful drug interactions in the elderly.          Aspirin is not on active medication list.  Aspirin use is not indicated based on review of current medical condition/s. Risk of harm outweighs potential benefits.  .    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  ACP discussion was held with the patient during this visit. Patient has an advance directive (not in EMR), copy requested.          Objective    Vitals:    12/06/21 1151   BP:  "128/76   Pulse: 72   Temp: 97.8 °F (36.6 °C)   SpO2: 98%   Weight: 65.4 kg (144 lb 3.2 oz)   Height: 157.5 cm (62.01\")   PainSc: 0-No pain     BMI Readings from Last 1 Encounters:   21 26.37 kg/m²   BMI is above normal parameters. Recommendations include: nutrition counseling    Does the patient have evidence of cognitive impairment? Yes    Physical Exam  Constitutional:       General: She is not in acute distress.  HENT:      Ears:      Comments: Hearing aids  Eyes:      General:         Right eye: No discharge.         Left eye: No discharge.      Conjunctiva/sclera: Conjunctivae normal.   Neck:      Thyroid: No thyromegaly.   Cardiovascular:      Rate and Rhythm: Normal rate and regular rhythm.   Pulmonary:      Effort: Pulmonary effort is normal.      Breath sounds: Normal breath sounds.   Abdominal:      Palpations: Abdomen is soft.      Tenderness: There is no abdominal tenderness.   Musculoskeletal:      Cervical back: Neck supple.      Right lower leg: No edema.      Left lower leg: No edema.   Lymphadenopathy:      Head:      Right side of head: No submandibular, preauricular or posterior auricular adenopathy.      Left side of head: No submandibular, preauricular or posterior auricular adenopathy.      Cervical: No cervical adenopathy.   Skin:     General: Skin is warm and dry.   Neurological:      Mental Status: She is alert and oriented to person, place, and time.   Psychiatric:         Speech: Speech normal.         Behavior: Behavior is cooperative.         Cognition and Memory: Memory is impaired.                 HEALTH RISK ASSESSMENT    Smoking Status:  Social History     Tobacco Use   Smoking Status Former Smoker   • Packs/day: 0.25   • Years: 5.00   • Pack years: 1.25   • Types: Cigarettes   • Quit date:    • Years since quittin.9   Smokeless Tobacco Never Used     Alcohol Consumption:  Social History     Substance and Sexual Activity   Alcohol Use No     Fall Risk Screen:    YASMANYADI " Fall Risk Assessment was completed, and patient is at HIGH risk for falls. Assessment completed on:12/6/2021  HUMA Fall Risk Clinician Key Questions   Have you fallen in the past year?: Yes    Stay Idependant Patient Questions   Patient Fall Risk Assessment Score : 0      Depression Screening:  PHQ-2/PHQ-9 Depression Screening 12/6/2021   Little interest or pleasure in doing things 0   Feeling down, depressed, or hopeless 0   Total Score 0       Health Habits and Functional and Cognitive Screening:  Functional & Cognitive Status 12/6/2021   Do you have difficulty preparing food and eating? No   Do you have difficulty bathing yourself, getting dressed or grooming yourself? No   Do you have difficulty using the toilet? No   Do you have difficulty moving around from place to place? No   Do you have trouble with steps or getting out of a bed or a chair? Yes   Current Diet Well Balanced Diet   Dental Exam Up to date   Eye Exam Up to date   Exercise (times per week) 0 times per week   Current Exercises Include No Regular Exercise   Current Exercise Activities Include -   Do you need help using the phone?  No   Are you deaf or do you have serious difficulty hearing?  Yes   Do you need help with transportation? Yes   Do you need help shopping? Yes   Do you need help preparing meals?  No   Do you need help with housework?  Yes   Do you need help with laundry? No   Do you need help taking your medications? Yes   Do you need help managing money? No   Do you ever drive or ride in a car without wearing a seat belt? No   Have you felt unusual stress, anger or loneliness in the last month? Yes   Who do you live with? Spouse   If you need help, do you have trouble finding someone available to you? No   Have you been bothered in the last four weeks by sexual problems? No   Do you have difficulty concentrating, remembering or making decisions? Yes       Age-appropriate Screening Schedule:  Refer to the list below for future  screening recommendations based on patient's age, sex and/or medical conditions. Orders for these recommended tests are listed in the plan section. The patient has been provided with a written plan.    Health Maintenance   Topic Date Due   • DXA SCAN  Never done   • ZOSTER VACCINE (1 of 2) Never done   • MAMMOGRAM  Never done   • INFLUENZA VACCINE  08/01/2021   • TDAP/TD VACCINES (2 - Td or Tdap) 09/15/2027              Immunization History   Administered Date(s) Administered   • COVID-19 (PFIZER) 01/21/2021, 02/10/2021, 09/28/2021   • Fluzone High-Dose 65+yrs 05/30/2020, 06/30/2021   • Tdap 09/15/2017       Assessment/Plan   CMS Preventative Services Quick Reference  Risk Factors Identified During Encounter  Dementia/Memory   Fall Risk-High or Moderate  Immunizations Discussed/Encouraged (specific Immunizations; Influenza  Obesity/Overweight   The above risks/problems have been discussed with the patient.  Follow up actions/plans if indicated are seen below in the Assessment/Plan Section.  Pertinent information has been shared with the patient in the After Visit Summary.    Diagnoses and all orders for this visit:    1. Medicare annual wellness visit, subsequent (Primary)  Counseled on health maintenance topics and preventative care recommendations: diet  2. Essential hypertension  Blood pressure is improved today.  At last visit dose was increased to 5 mg but daughter is unsure whether she has still been taking the 2.5 mg dose.  She will check bottles at home and then contact the office with information.  3. JOSE (generalized anxiety disorder)  -     escitalopram (LEXAPRO) 10 MG tablet; Take 1 tablet by mouth Every Morning.  Dispense: 90 tablet; Refill: 3  Increase Lexapro to 10 mg daily.  Follow-up if not improving.  4. Memory loss  Keep appointment with neurology next month and may consider changing donepezil dosing.  5. Immunization due  Called pharmacy and patient received a flu vaccine in June which seems  unusual.  Will obtain full immunization records.  6. Major depression in remission (HCC)  -     escitalopram (LEXAPRO) 10 MG tablet; Take 1 tablet by mouth Every Morning.  Dispense: 90 tablet; Refill: 3  Continue Lexapro.  7. Urge incontinence of urine  Symptoms mostly controlled with Myrbetriq 25 mg.  Discussed if not improving to increase to 50 mg.  8. Visit for screening mammogram  Discussed screening mammogram and patient would like to have the outside facility at [place].  She plans on scheduling on her own.  9. Herpes labialis  Avoiding acidic food triggers.   10. Acquired hypothyroidism  Most recent thyroid function in range and recommend checking annually.  11. Hyperparathyroidism (HCC)  She has been followed by nephrology and has nuclear medicine scan.  Daughter reports that depending upon the results she may be starting a new medication.      Follow Up:   Return in about 6 months (around 6/6/2022) for Office visit HTN, anxiety, and , MWV and fasting labs.     An After Visit Summary and PPPS were made available to the patient.               Electronically signed by Ysabel Stewart MD, 12/06/21, 12:47 PM EST.

## 2021-12-06 NOTE — PATIENT INSTRUCTIONS
1. Please check for copy of Advanced Directive/ Living Will.  2. Increase lexapro to 10mg. You may take lexapro 5mg 2 pills once a day to use your existing prescription. New prescription for lexapro 10mg sent to the pharmacy.   3. Continue Mybetriq 25mg. If urination increases, can try higher dose Mybetriq 50mg.   4. Check home medication bottles for 2.5 mg or 5mg.  Goal <140/90. Continue current dose.   5. Check if still taking nitrofurantoin antibiotic.           Advance Care Planning and Advance Directives     You make decisions on a daily basis - decisions about where you want to live, your career, your home, your life. Perhaps one of the most important decisions you face is your choice for future medical care. Take time to talk with your family and your healthcare team and start planning today.  Advance Care Planning is a process that can help you:  · Understand possible future healthcare decisions in light of your own experiences  · Reflect on those decision in light of your goals and values  · Discuss your decisions with those closest to you and the healthcare professionals that care for you  · Make a plan by creating a document that reflects your wishes    Surrogate Decision Maker  In the event of a medical emergency, which has left you unable to communicate or to make your own decisions, you would need someone to make decisions for you.  It is important to discuss your preferences for medical treatment with this person while you are in good health.     Qualities of a surrogate decision maker:  • Willing to take on this role and responsibility  • Knows what you want for future medical care  • Willing to follow your wishes even if they don't agree with them  • Able to make difficult medical decisions under stressful circumstances    Advance Directives  These are legal documents you can create that will guide your healthcare team and decision maker(s) when needed. These documents can be stored in the  electronic medical record.    · Living Will - a legal document to guide your care if you have a terminal condition or a serious illness and are unable to communicate. States vary by statute in document names/types, but most forms may include one or more of the following:        -  Directions regarding life-prolonging treatments        -  Directions regarding artificially provided nutrition/hydration        -  Choosing a healthcare decision maker        -  Direction regarding organ/tissue donation    · Durable Power of  for Healthcare - this document names an -in-fact to make medical decisions for you, but it may also allow this person to make personal and financial decisions for you. Please seek the advice of an  if you need this type of document.    **Advance Directives are not required and no one may discriminate against you if you do not sign one.    Medical Orders  Many states allow specific forms/orders signed by your physician to record your wishes for medical treatment in your current state of health. This form, signed in personal communication with your physician, addresses resuscitation and other medical interventions that you may or may not want.      For more information or to schedule a time with a [facility] Advance Care Planning Facilitator contact: [facility] or call [phone] and someone will contact you directly.  You are due for Shingrix vaccination series ( the newest shingles vaccine).  It is a two shot series spaced 2-6 months apart. Please get this vaccine series started at your earliest convenience at your local pharmacy to help avoid shingles outbreak. It is more effective than the old Zostavax vaccine and is recommended even if you have had the Zostavax vaccine in the past.  Once the Shingrix series is completed, it does not need to be repeated.   For more information, please look at the website below:  [facility] Shingrix Vaccine Information      Medicare  Wellness  Personal Prevention Plan of Service     Date of Office Visit:    Encounter Provider:  Ysabel Stewart MD  Place of Service:  [facility]  Patient Name: Maya Anand  :  1940    As part of the Medicare Wellness portion of your visit today, we are providing you with this personalized preventive plan of services (PPPS). This plan is based upon recommendations of the [place] Preventive Services Task Force (USPSTF) and the Advisory Committee on Immunization Practices (ACIP).    This lists the preventive care services that should be considered, and provides dates of when you are due. Items listed as completed are up-to-date and do not require any further intervention.    Health Maintenance   Topic Date Due   • DXA SCAN  Never done   • COLORECTAL CANCER SCREENING  Never done   • ZOSTER VACCINE (1 of 2) Never done   • Pneumococcal Vaccine 65+ (1 of 1 - PPSV23) Never done   • MAMMOGRAM  Never done   • INFLUENZA VACCINE  2021   • ANNUAL WELLNESS VISIT  2022   • TDAP/TD VACCINES (2 - Td or Tdap) 09/15/2027   • COVID-19 Vaccine  Completed       No orders of the defined types were placed in this encounter.      No follow-ups on file.

## 2021-12-08 ENCOUNTER — HOSPITAL ENCOUNTER (OUTPATIENT)
Dept: NUCLEAR MEDICINE | Facility: HOSPITAL | Age: 81
Discharge: HOME | End: 2021-12-08

## 2021-12-08 DIAGNOSIS — E21.0 PRIMARY HYPERPARATHYROIDISM (HCC): ICD-10-CM

## 2021-12-08 DIAGNOSIS — E83.52 HYPERCALCEMIA: ICD-10-CM

## 2021-12-08 PROCEDURE — A9500 TC99M SESTAMIBI: HCPCS | Performed by: INTERNAL MEDICINE

## 2021-12-08 PROCEDURE — 0 TECHNETIUM SESTAMIBI: Performed by: INTERNAL MEDICINE

## 2021-12-08 PROCEDURE — 78070 PARATHYROID PLANAR IMAGING: CPT

## 2021-12-08 RX ADMIN — TECHNETIUM TC 99M SESTAMIBI 1 DOSE: 1 INJECTION INTRAVENOUS at 12:25

## 2022-01-26 ENCOUNTER — OFFICE VISIT (OUTPATIENT)
Dept: NEUROLOGY | Facility: CLINIC | Age: 82
End: 2022-01-26

## 2022-01-26 DIAGNOSIS — G31.84 MILD COGNITIVE IMPAIRMENT: Primary | ICD-10-CM

## 2022-01-26 PROCEDURE — 99214 OFFICE O/P EST MOD 30 MIN: CPT | Performed by: PHYSICIAN ASSISTANT

## 2022-01-26 RX ORDER — DONEPEZIL HYDROCHLORIDE 10 MG/1
10 TABLET, FILM COATED ORAL DAILY
Qty: 90 TABLET | Refills: 3 | Status: SHIPPED | OUTPATIENT
Start: 2022-01-26 | End: 2022-08-25 | Stop reason: SDUPTHER

## 2022-01-26 NOTE — PROGRESS NOTES
Subjective         Chief Complaint: memory loss      History of Present Illness   Maya Anand is a 81 y.o. female who returns to clinic today for evaluation of memory loss. She has noted symptoms for at least several years marked initially by forgetfulness and word-finding difficulties. This has worsened  over time. Additional symptoms have included impairments in executive function. There have been no associated symptoms of anxiety or depression. She denies impairments in ADL's. She manages her medications. She is currently residing with family.      Her family has noted balance impairment and a shuffling gait. This has worsened over time. She tends to lean forward after walking for longer distances. She denies any recent falls or additional associated neurologic symptoms.     Prior evaluation has included an MRI of the brain which was essentially unremarkable. Screening blood work was notable for kidney dysfunction, for which she recently established care with nephrology. She is currently taking Prevagen.     Today: Since her last visit in 7/21, she feels essentially unchanged.       I have reviewed and confirmed the past family, social and medical history as accurate on 1/26/22.     Review of Systems   Constitutional: Negative.    HENT: Negative.    Eyes: Negative.    Respiratory: Negative.    Cardiovascular: Negative.    Gastrointestinal: Negative.    Endocrine: Negative.    Genitourinary: Negative.    Musculoskeletal: Negative.    Skin: Negative.    Allergic/Immunologic: Negative.    Hematological: Negative.        Objective     General appearance today is normal.     Physical Exam  Neurological:      Mental Status: She is oriented to person, place, and time.      Coordination: Finger-Nose-Finger Test normal.      Deep Tendon Reflexes: Strength normal.   Psychiatric:         Speech: Speech normal.          Neurologic Exam     Mental Status   Oriented to person, place, and time.   Registration: recalls 3 of  3 objects. Recall at 5 minutes: recalls 1 of 3 objects. Follows 3 step commands.   Attention: normal.   Speech: speech is normal   Level of consciousness: alert  Able to name object. Able to read. Able to repeat. Able to write. Normal comprehension.     Cranial Nerves   Cranial nerves II through XII intact.     Motor Exam   Muscle bulk: normal  Overall muscle tone: normal    Strength   Strength 5/5 throughout.     Gait, Coordination, and Reflexes     Coordination   Finger to nose coordination: normal    Tremor   Resting tremor: absent        Results  MMSE=28      Assessment/Plan   Diagnoses and all orders for this visit:    1. Mild cognitive impairment (Primary)    Other orders  -     donepezil (Aricept) 10 MG tablet; Take 1 tablet by mouth Daily.  Dispense: 90 tablet; Refill: 3          Discussion/Summary   Maya Anand returns to clinic today for evaluation of Mild Cognitive Impairment (MCI) . I again reviewed her current status and treatment options. After discussing potential treatment options, it was elected to increase  donepezil to 10mg daily. She will then follow up in 6 months , or sooner if needed.   Total time of visit today: 30 minutes. As part of this visit I obtained additional history from the family which is incorporated in the HPI. I also discussed diagnosis, evaluation, current status, treatment options and management as discussed above.           Anjelica Vergara PA-C

## 2022-01-26 NOTE — PATIENT INSTRUCTIONS
Things discussed:   1- increase donepezil/aricept to 10mg daily.  2- follow up in 6 months or sooner if needed.      Mild Neurocognitive Disorder  Mild neurocognitive disorder, formerly known as mild cognitive impairment, is a disorder in which memory does not work as well as it should. This disorder may also cause problems with other mental functions, including thought, communication, behavior, and completion of tasks. These problems can be noticed and measured, but they usually do not interfere with daily activities or the ability to live independently.  Mild neurocognitive disorder typically develops after 60 years of age, but it can also develop at younger ages. It is not as serious as major neurocognitive disorder, also known as dementia, but it may be the first sign of it. Generally, symptoms of this condition get worse over time. In rare cases, symptoms can get better.  What are the causes?  This condition may be caused by:  · Brain disorders like Alzheimer's disease, Parkinson's disease, and other conditions that gradually damage nerve cells (neurodegenerative conditions).  · Diseases that affect blood vessels in the brain and result in small strokes.  · Certain infections, such as HIV.  · Traumatic brain injury.  · Other medical conditions, such as brain tumors, underactive thyroid (hypothyroidism), and vitamin B12 deficiency.  · Use of certain drugs or prescription medicines.  What increases the risk?  The following factors may make you more likely to develop this condition:  · Being older than 65 years.  · Being male.  · Low education level.  · Diabetes, high blood pressure, high cholesterol, and other conditions that increase the risk for blood vessel diseases.  · Untreated or undertreated sleep apnea.  · Having a certain type of gene that can be passed from parent to child (inherited).  · Chronic health problems such as heart disease, lung disease, liver disease, kidney disease, or depression.  What are  the signs or symptoms?  Symptoms of this condition include:  · Difficulty remembering. You may:  ? Forget names, phone numbers, or details of recent events.  ? Forget social events and appointments.  ? Repeatedly forget where you put your car keys or other items.  · Difficulty thinking and solving problems. You may have trouble with complex tasks, such as:  ? Paying bills.  ? Driving in unfamiliar places.  · Difficulty communicating. You may have trouble:  ? Finding the right word or naming an object.  ? Forming a sentence that makes sense, or understanding what you read or hear.  · Changes in your behavior or personality. When this happens, you may:  ? Lose interest in the things that you used to enjoy.  ? Withdraw from social situations.  ? Get angry more easily than usual.  ? Act before thinking.  How is this diagnosed?  This condition is diagnosed based on:  · Your symptoms. Your health care provider may ask you and the people you spend time with, such as family and friends, about your symptoms.  · Evaluation of mental functions (neuropsychological testing). Your health care provider may refer you to a neurologist or mental health specialist to evaluate your mental functions in detail.  To identify the cause of your condition, your health care provider may:  · Get a detailed medical history.  · Ask about use of alcohol, drugs, and prescription medicines.  · Do a physical exam.  · Order blood tests and brain imaging exams.  How is this treated?  Mild neurocognitive disorder that is caused by medicine use, drug use, infection, or another medical condition may improve when the cause is treated, or when medicines or drugs are stopped. If this disorder has another cause, it generally does not improve and may get worse. In these cases, the goal of treatment is to help you manage the loss of mental function. Treatments in these cases include:  · Medicine. Medicine mainly helps memory and behavior symptoms.  · Talk  therapy. Talk therapy provides education, emotional support, memory aids, and other ways of making up for problems with mental function.  · Lifestyle changes, including:  ? Getting regular exercise.  ? Eating a healthy diet that includes omega-3 fatty acids.  ? Challenging your thinking and memory skills.  ? Having more social interaction.  Follow these instructions at home:  Eating and drinking    · Drink enough fluid to keep your urine pale yellow.  · Eat a healthy diet that includes omega-3 fatty acids. These can be found in:  ? Fish.  ? Nuts.  ? Leafy vegetables.  ? Vegetable oils.  · If you drink alcohol:  ? Limit how much you use to:  § 0-1 drink a day for women.  § 0-2 drinks a day for men.  ? Be aware of how much alcohol is in your drink. In the [place] one drink equals one 12 oz bottle of beer (355 mL), one 5 oz glass of wine (148 mL), or one 1½ oz glass of hard liquor (44 mL).    Lifestyle    · Get regular exercise as told by your health care provider.  · Do not use any products that contain nicotine or tobacco, such as cigarettes, e-cigarettes, and chewing tobacco. If you need help quitting, ask your health care provider.  · Practice ways to manage stress. If you need help managing stress, ask your health care provider.  · Continue to have social interaction.  · Keep your mind active with stimulating activities you enjoy, such as reading or playing games.  · Make sure to get quality sleep. Follow these tips:  ? Avoid napping during the day.  ? Keep your sleeping area dark and cool.  ? Avoid exercising during the few hours before you go to bed.  ? Avoid caffeine products in the evening.    General instructions  · Take over-the-counter and prescription medicines only as told by your health care provider. Your health care provider may recommend that you avoid taking medicines that can affect thinking, such as pain medicines or sleep medicines.  · Work with your health care provider to find out what you need  help with and what your safety needs are.  · Keep all follow-up visits. This is important.  Where to find more information  · [place] on Aging: www.janette.nih.gov  Contact a health care provider if:  · You have any new symptoms.  Get help right away if:  · You develop new confusion or your confusion gets worse.  · You act in ways that place you or your family in danger.  Summary  · Mild neurocognitive disorder is a disorder in which memory does not work as well as it should.  · Mild neurocognitive disorder can have many causes. It may be the first stage of dementia.  · To manage your condition, get regular exercise, keep your mind active, get quality sleep, and eat a healthy diet.  This information is not intended to replace advice given to you by your health care provider. Make sure you discuss any questions you have with your health care provider.  Document Revised: 05/03/2021 Document Reviewed: 05/03/2021  Elsevier Patient Education © 2021 [employer]vier Inc[employer]

## 2022-02-09 RX ORDER — DONEPEZIL HYDROCHLORIDE 10 MG/1
10 TABLET, FILM COATED ORAL DAILY
Qty: 90 TABLET | Refills: 3 | OUTPATIENT
Start: 2022-02-09 | End: 2023-02-09

## 2022-02-27 DIAGNOSIS — N39.41 URGE INCONTINENCE: ICD-10-CM

## 2022-04-18 DIAGNOSIS — I10 ESSENTIAL HYPERTENSION: ICD-10-CM

## 2022-04-18 NOTE — TELEPHONE ENCOUNTER
Caller: Ann Moore    Relationship: Emergency Contact    Best call back number: [phone]    Requested Prescriptions:   Requested Prescriptions     Pending Prescriptions Disp Refills   • amLODIPine (NORVASC) 5 MG tablet 90 tablet 1     Sig: Take 1 tablet by mouth Daily.        Pharmacy where request should be sent: [facility] - [address] [phone] [facility] [phone] FX     Additional details provided by patient: PATIENT IS COMPLETELY OUT. SHE NEEDS HER PRESCRIPTION SENT TO [facility] BUT SHE ALSO IS REQUESTING A PARTIAL TO BE SENT TO     [id number] - [place], [place] - [address] [place] [address] AT [facility] - [phone] [place] [phone] FX     Does the patient have less than a 3 day supply:  [x] Yes  [] No    Janice Timmons Rep   04/18/22 14:16 EDT

## 2022-04-19 RX ORDER — AMLODIPINE BESYLATE 5 MG/1
5 TABLET ORAL DAILY
Qty: 90 TABLET | Refills: 1 | Status: SHIPPED | OUTPATIENT
Start: 2022-04-19 | End: 2022-04-22 | Stop reason: SDUPTHER

## 2022-04-19 NOTE — TELEPHONE ENCOUNTER
Rx Refill Note  Requested Prescriptions     Pending Prescriptions Disp Refills   • amLODIPine (NORVASC) 5 MG tablet 90 tablet 1     Sig: Take 1 tablet by mouth Daily.      Last office visit with prescribing clinician: 12/6/2021      Next office visit with prescribing clinician: 6/6/2022            Maddie Chang MA  04/19/22, 09:16 EDT

## 2022-04-22 ENCOUNTER — TELEPHONE (OUTPATIENT)
Dept: FAMILY MEDICINE CLINIC | Facility: CLINIC | Age: 82
End: 2022-04-22

## 2022-04-22 DIAGNOSIS — I10 ESSENTIAL HYPERTENSION: ICD-10-CM

## 2022-04-22 RX ORDER — AMLODIPINE BESYLATE 5 MG/1
5 TABLET ORAL DAILY
Qty: 10 TABLET | Refills: 0 | Status: SHIPPED | OUTPATIENT
Start: 2022-04-22 | End: 2022-07-11 | Stop reason: SDUPTHER

## 2022-04-22 NOTE — TELEPHONE ENCOUNTER
Caller: Paulette Siddiqui    Relationship to patient: Emergency Contact    Best call back number: [phone]    Patient is needing: PAULETTE STATED THAT SHE SENT A MESSAGE IN [employer] STATING THAT PATIENT WILL BR OUT OF BLOOD PRESSURE MEDICATION FOR 7 DAYS UNTIL [facility] SENDS MEDICATION TO PATIENT AND WOULD LIKE TO KNOW IF EMERGENCY REFILL COULD BE SENT TO [facility]'S PHARMACY BUT WANTED TO CALL AND MAKE SURE PROVIDER SEES IT TODAY     PLEASE ADVISE

## 2022-04-22 NOTE — TELEPHONE ENCOUNTER
Rx Refill Note  Requested Prescriptions     Signed Prescriptions Disp Refills   • amLODIPine (NORVASC) 5 MG tablet 10 tablet 0     Sig: Take 1 tablet by mouth Daily.     Authorizing Provider: AZAM VEGA     Ordering User: VERENA RICKS      Last office visit with prescribing clinician: 12/6/2021      Next office visit with prescribing clinician: 6/6/2022            Verena Ricks MA  04/22/22, 09:14 EDT

## 2022-05-03 ENCOUNTER — TELEPHONE (OUTPATIENT)
Dept: FAMILY MEDICINE CLINIC | Facility: CLINIC | Age: 82
End: 2022-05-03

## 2022-05-03 NOTE — TELEPHONE ENCOUNTER
DR CHARLES         PATIENT RETURN CALL AND WOULD LIKE TO HAVE SOME ONE CALL BACK.     LOOKED IN CHART DO NOT SEE CALL MADE TO PATIENT THIS MORNING.       PLEASE RETURN CALL       THANK YOU        Patient has been scheduled 5/4 with PCP

## 2022-05-03 NOTE — TELEPHONE ENCOUNTER
Caller: Paulette Siddiqui    Relationship: Emergency Contact    Best call back number:  [phone]    What is the best time to reach you:   ANYTIME     Who are you requesting to speak with (clinical staff, provider,  specific staff member):   PCP OR NURSE    Do you know the name of the person who called:   PAULETTE DELGADO DAUGHTER    What was the call regarding:   PATIENT IS HAVING INCONTINENCE ISSUES THAT ARE GETTING WORSE; TRIED TO SCHEDULE AN APPOINTMENT BUT COULD NOT GET IN TILL June; PLEASE CALL TO DISCUSES OPTIONS POSSIBLY CHANGE MEDICATIONS OR WORK PATIENT IN FOR AN APPOINTMENT    Do you require a callback: CALL BACK TO DISCUSE

## 2022-05-04 ENCOUNTER — OFFICE VISIT (OUTPATIENT)
Dept: FAMILY MEDICINE CLINIC | Facility: CLINIC | Age: 82
End: 2022-05-04

## 2022-05-04 VITALS
OXYGEN SATURATION: 97 % | WEIGHT: 139 LBS | SYSTOLIC BLOOD PRESSURE: 124 MMHG | BODY MASS INDEX: 25.58 KG/M2 | HEART RATE: 66 BPM | DIASTOLIC BLOOD PRESSURE: 70 MMHG | HEIGHT: 62 IN

## 2022-05-04 DIAGNOSIS — I10 ESSENTIAL HYPERTENSION: Chronic | ICD-10-CM

## 2022-05-04 DIAGNOSIS — N39.41 URGE INCONTINENCE OF URINE: Primary | ICD-10-CM

## 2022-05-04 DIAGNOSIS — N39.0 ACUTE UTI: ICD-10-CM

## 2022-05-04 LAB
BILIRUB BLD-MCNC: NEGATIVE MG/DL
CLARITY, POC: ABNORMAL
COLOR UR: ABNORMAL
EXPIRATION DATE: ABNORMAL
GLUCOSE UR STRIP-MCNC: NEGATIVE MG/DL
KETONES UR QL: NEGATIVE
LEUKOCYTE EST, POC: ABNORMAL
Lab: ABNORMAL
NITRITE UR-MCNC: POSITIVE MG/ML
PH UR: 5.5 [PH] (ref 5–8)
PROT UR STRIP-MCNC: ABNORMAL MG/DL
RBC # UR STRIP: ABNORMAL /UL
SP GR UR: 1.02 (ref 1–1.03)
UROBILINOGEN UR QL: NORMAL

## 2022-05-04 PROCEDURE — 87077 CULTURE AEROBIC IDENTIFY: CPT | Performed by: FAMILY MEDICINE

## 2022-05-04 PROCEDURE — 87086 URINE CULTURE/COLONY COUNT: CPT | Performed by: FAMILY MEDICINE

## 2022-05-04 PROCEDURE — 99214 OFFICE O/P EST MOD 30 MIN: CPT | Performed by: FAMILY MEDICINE

## 2022-05-04 PROCEDURE — 87186 SC STD MICRODIL/AGAR DIL: CPT | Performed by: FAMILY MEDICINE

## 2022-05-04 PROCEDURE — 81003 URINALYSIS AUTO W/O SCOPE: CPT | Performed by: FAMILY MEDICINE

## 2022-05-04 RX ORDER — SULFAMETHOXAZOLE AND TRIMETHOPRIM 800; 160 MG/1; MG/1
1 TABLET ORAL 2 TIMES DAILY
Qty: 10 TABLET | Refills: 0 | Status: SHIPPED | OUTPATIENT
Start: 2022-05-04 | End: 2022-05-09

## 2022-05-04 NOTE — PROGRESS NOTES
"Chief Complaint  Urinary incontinence    Subjective          Maya Anand presents to [facility]  History of Present Illness   Daughter Etelvina present at appointment and contributing to history.    She can urinate any time. She wakes up every 2 hours at night to urinate, hard time making it without leakage. She wears poise pad continuous and changes several times during the day. Denies painful urination. Taking mybetriq.         Objective   Vital Signs:   /70   Pulse 66   Ht 157.5 cm (62.01\")   Wt 63 kg (139 lb)   SpO2 97%   BMI 25.42 kg/m²     Physical Exam  Vitals reviewed.   Constitutional:       General: She is not in acute distress.     Appearance: She is not ill-appearing.   HENT:      Head:      Comments: Hearing loss  Cardiovascular:      Rate and Rhythm: Normal rate and regular rhythm.   Pulmonary:      Effort: Pulmonary effort is normal.      Breath sounds: Normal breath sounds.   Genitourinary:     Comments: No CVA or suprapubic tenderness  Neurological:      Mental Status: She is alert.   Psychiatric:         Cognition and Memory: Memory is impaired.        Result Review :              Results for orders placed or performed in visit on 05/04/22   POCT urinalysis dipstick, automated    Specimen: Urine   Result Value Ref Range    Color Dark Yellow Yellow, Straw, Dark Yellow, Trista    Clarity, UA Cloudy (A) Clear    Specific Gravity  1.020 1.005 - 1.030    pH, Urine 5.5 5.0 - 8.0    Leukocytes Trace (A) Negative    Nitrite, UA Positive (A) Negative    Protein, POC 1+ (A) Negative mg/dL    Glucose, UA Negative Negative, 1000 mg/dL (3+) mg/dL    Ketones, UA Negative Negative    Urobilinogen, UA Normal Normal    Bilirubin Negative Negative    Blood, UA Trace (A) Negative    Lot Number [id number]     Expiration Date 10-21-23           Assessment and Plan    Diagnoses and all orders for this visit:    1. Urge incontinence of urine (Primary)  -     POCT urinalysis " dipstick, automated  -     Mirabegron ER (Myrbetriq) 50 MG tablet sustained-release 24 hour 24 hr tablet; Take 50 mg by mouth Daily.  Dispense: 90 tablet; Refill: 3  Uncontrolled.  Worsening with concurrent UTI.  She also has significant nocturia.  Recommend increasing Myrbetriq dose to 50 mg.  If in the next month after she has completed antibiotics and taking the Myrbetriq for several weeks she is still experiencing urinary frequency and incontinence would recommend consultation with urology for other treatment options.  2. Acute UTI  -     Urine Culture - Urine, Urine, Clean Catch; Future  -     sulfamethoxazole-trimethoprim (BACTRIM DS,SEPTRA DS) 800-160 MG per tablet; Take 1 tablet by mouth 2 (Two) Times a Day for 5 days.  Dispense: 10 tablet; Refill: 0  New.  Treat with Bactrim.  Also discussed if she has recurring UTIs can try over-the-counter bladder health supplements.  Discussed that in older adults signs and symptoms of UTI can present in different ways.  If there is a suspicion for UTI recommend scheduling an office visit so that she can be evaluated in person and also perform urine testing and culture.  Also discussed with postmenopausal women can use estrogen cream to help reduce UTIs in the future if needed.  3. Essential hypertension  Blood pressure well controlled with amlodipine 5 mg.             Follow Up   Return if symptoms worsen or fail to improve.  Patient was given instructions and counseling regarding her condition or for health maintenance advice. Please see specific information pulled into the AVS if appropriate.   Electronically signed by Ysabel Stewart MD, 05/04/22, 9:57 AM EDT.

## 2022-05-06 LAB — BACTERIA SPEC AEROBE CULT: ABNORMAL

## 2022-07-11 ENCOUNTER — OFFICE VISIT (OUTPATIENT)
Dept: FAMILY MEDICINE CLINIC | Facility: CLINIC | Age: 82
End: 2022-07-11

## 2022-07-11 VITALS
TEMPERATURE: 98.2 F | SYSTOLIC BLOOD PRESSURE: 118 MMHG | DIASTOLIC BLOOD PRESSURE: 68 MMHG | OXYGEN SATURATION: 97 % | BODY MASS INDEX: 24.48 KG/M2 | WEIGHT: 133 LBS | HEART RATE: 62 BPM | HEIGHT: 62 IN

## 2022-07-11 DIAGNOSIS — I10 ESSENTIAL HYPERTENSION: ICD-10-CM

## 2022-07-11 DIAGNOSIS — N39.0 FREQUENT UTI: ICD-10-CM

## 2022-07-11 DIAGNOSIS — R32 URINARY INCONTINENCE, UNSPECIFIED TYPE: Primary | ICD-10-CM

## 2022-07-11 DIAGNOSIS — R63.4 WEIGHT LOSS: ICD-10-CM

## 2022-07-11 PROCEDURE — 99213 OFFICE O/P EST LOW 20 MIN: CPT | Performed by: NURSE PRACTITIONER

## 2022-07-11 RX ORDER — AMLODIPINE BESYLATE 5 MG/1
5 TABLET ORAL DAILY
Qty: 30 TABLET | Refills: 0 | Status: SHIPPED | OUTPATIENT
Start: 2022-07-11 | End: 2022-08-25 | Stop reason: SDUPTHER

## 2022-07-11 NOTE — PROGRESS NOTES
"Chief Complaint  Fall (Pt's daughter states she's had increased weakness and multiple falls. ), Follow-up (ER f/u. ), Fatigue, and Urinary Incontinence (Pt's daughter states she's discussed this with PCP and they've tried treatment options. The next steps were to see urology. She is wanting a referral to see )    Subjective        Maya Anand presents to [facility]  Pt is here today for follow up from ER visit on 6/24/2022. Daughter is with pt and providing pt history. She was seen in ER for a fall and a UTI. Daughter is here with patient. Daughter states pt is falling more often. The family has noticed an increase in weakness and fatigue. Pt has had recurrent UTIs. She also suffers from incontinence. She states she is not able to control bladder. She has been on Myrbetriq. Dose was increased at last visit, but pt and daughter state that the medication has not made a difference in her symptoms.     Pt finished antibiotics from UTI. She has a repeat visit last Thursday (4 days ago) with a urine culture that was negative.     Discussed following up with PCP in a month. Daughter is concerned regarding weight loss. Pt has lost 6 pounds in the last month and a half. This could be due to frequent UTIs. Referral for Urology placed.     She also needs refill for Norvasc. Dose was increased at last visit. Daughter states they will monitor BP at home and discuss at follow up with Dr. Stewart. New dose seems to be appropriate for pt.     Objective   Vital Signs:  /68   Pulse 62   Temp 98.2 °F (36.8 °C)   Ht 157.5 cm (62.01\")   Wt 60.3 kg (133 lb)   SpO2 97%   BMI 24.32 kg/m²   Estimated body mass index is 24.32 kg/m² as calculated from the following:    Height as of this encounter: 157.5 cm (62.01\").    Weight as of this encounter: 60.3 kg (133 lb).    BMI is within normal parameters. No other follow-up for BMI required.    Physical Exam  Vitals reviewed. "   Constitutional:       Appearance: She is normal weight.   HENT:      Nose: Nose normal.      Mouth/Throat:      Mouth: Mucous membranes are moist.   Cardiovascular:      Rate and Rhythm: Normal rate and regular rhythm.      Heart sounds: Normal heart sounds.   Pulmonary:      Effort: Pulmonary effort is normal.      Breath sounds: Normal breath sounds.   Abdominal:      General: Abdomen is flat.      Palpations: Abdomen is soft.   Musculoskeletal:      Comments: Weakness   Skin:     General: Skin is warm.   Neurological:      Mental Status: She is alert and oriented to person, place, and time.   Psychiatric:         Mood and Affect: Mood normal.         Behavior: Behavior normal.         Thought Content: Thought content normal.        Result Review :                Assessment and Plan   Diagnoses and all orders for this visit:    1. Urinary incontinence, unspecified type (Primary)  -     Ambulatory Referral to Urology    2. Frequent UTI  -     Ambulatory Referral to Urology    3. Weight loss    4. Essential hypertension  -     amLODIPine (NORVASC) 5 MG tablet; Take 1 tablet by mouth Daily.  Dispense: 30 tablet; Refill: 0           Follow Up   Return in about 1 month (around 8/11/2022) for Recheck.  Patient was given instructions and counseling regarding her condition or for health maintenance advice. Please see specific information pulled into the AVS if appropriate.

## 2022-07-26 ENCOUNTER — PATIENT MESSAGE (OUTPATIENT)
Dept: FAMILY MEDICINE CLINIC | Facility: CLINIC | Age: 82
End: 2022-07-26

## 2022-07-26 DIAGNOSIS — R32 URINARY INCONTINENCE, UNSPECIFIED TYPE: ICD-10-CM

## 2022-07-26 DIAGNOSIS — N39.0 FREQUENT UTI: Primary | ICD-10-CM

## 2022-07-28 ENCOUNTER — OFFICE VISIT (OUTPATIENT)
Dept: NEUROLOGY | Facility: CLINIC | Age: 82
End: 2022-07-28

## 2022-07-28 VITALS
DIASTOLIC BLOOD PRESSURE: 60 MMHG | SYSTOLIC BLOOD PRESSURE: 110 MMHG | OXYGEN SATURATION: 95 % | RESPIRATION RATE: 18 BRPM | HEART RATE: 65 BPM

## 2022-07-28 DIAGNOSIS — G30.9 ALZHEIMER'S DISEASE: Primary | ICD-10-CM

## 2022-07-28 DIAGNOSIS — F02.80 ALZHEIMER'S DISEASE: Primary | ICD-10-CM

## 2022-07-28 PROCEDURE — 99215 OFFICE O/P EST HI 40 MIN: CPT | Performed by: PHYSICIAN ASSISTANT

## 2022-07-28 RX ORDER — MEMANTINE HYDROCHLORIDE 10 MG/1
10 TABLET ORAL 2 TIMES DAILY
Qty: 60 TABLET | Refills: 11 | Status: SHIPPED | OUTPATIENT
Start: 2022-07-28 | End: 2022-07-28

## 2022-07-28 RX ORDER — MEMANTINE HYDROCHLORIDE 10 MG/1
10 TABLET ORAL 2 TIMES DAILY
Qty: 30 TABLET | Refills: 11 | Status: SHIPPED | OUTPATIENT
Start: 2022-07-28 | End: 2022-08-15 | Stop reason: SDUPTHER

## 2022-07-28 NOTE — PROGRESS NOTES
Subjective       Chief Complaint: memory loss      History of Present Illness   Maya Anand is a 82 y.o. female who returns to clinic today for evaluation of memory loss. She has noted symptoms for at least several years marked initially by forgetfulness and word-finding difficulties. This has worsened  over time. Additional symptoms have included impairments in executive function. There have been no associated symptoms of anxiety or depression. She denies impairments in ADL's. She is currently residing with family.      Her family has noted balance impairment and a shuffling gait. This has worsened over time. She tends to lean forward after walking for longer distances. She denies any recent falls or additional associated neurologic symptoms.     Prior evaluation has included an MRI of the brain which was essentially unremarkable. Screening blood work was notable for kidney dysfunction, for which she recently established care with nephrology. She is currently taking donepezil and Prevagen.     Today: Since her last visit in 1/22, she feels essentially unchanged. Her family has noted a continued cognitive decline. Her  is now managing her medications. She has had several UTI's since her last visit and is scheduled to establish care with urology in the future. She also notes balance impairment.      I have reviewed and confirmed the past family, social and medical history as accurate on 7/28/22.     Review of Systems   Constitutional: Negative.    HENT: Negative.    Eyes: Negative.    Respiratory: Negative.    Cardiovascular: Negative.    Gastrointestinal: Negative.    Endocrine: Negative.    Genitourinary: Negative.    Musculoskeletal: Negative.    Skin: Negative.    Allergic/Immunologic: Negative.    Hematological: Negative.        Objective     /60   Pulse 65   Resp 18   SpO2 95%     General appearance today is normal.     Physical Exam  Neurological:      Mental Status: She is oriented to  person, place, and time.      Coordination: Finger-Nose-Finger Test and Heel to Shin Test normal.      Deep Tendon Reflexes: Strength normal.   Psychiatric:         Speech: Speech normal.          Neurologic Exam     Mental Status   Oriented to person, place, and time.   Registration: recalls 3 of 3 objects. Recall at 5 minutes: recalls 2 of 3 objects.   Attention: normal.   Speech: speech is normal   Level of consciousness: alert  Able to name object. Able to read. Able to repeat. Able to write. Normal comprehension.     Cranial Nerves   Cranial nerves II through XII intact.     CN VIII   Hearing: impaired    Motor Exam   Muscle bulk: normal  Overall muscle tone: normal    Strength   Strength 5/5 throughout.     Sensory Exam   Light touch normal.     Gait, Coordination, and Reflexes     Gait  Gait: (unsteady)    Coordination   Finger to nose coordination: normal  Heel to shin coordination: normal    Tremor   Resting tremor: absent        Results  MMSE=26      Assessment & Plan   Diagnoses and all orders for this visit:    1. Alzheimer's disease (HCC) (Primary)  -     Ambulatory Referral to Home Health    Other orders  -     Discontinue: memantine (NAMENDA) 10 MG tablet; Take 1 tablet by mouth 2 (Two) Times a Day.  Dispense: 60 tablet; Refill: 11  -     memantine (NAMENDA) 10 MG tablet; Take 1 tablet by mouth 2 (Two) Times a Day.  Dispense: 30 tablet; Refill: 11          Discussion/Summary   Maya Anand returns to clinic today with a history and examination today now most consistent with Alzheimer's Disease. This was discussed in detail.  I again reviewed her current status and treatment options. After discussing potential treatment options, it was elected to add memantine (titrating up to 10mg daily given her kidney dysfunction) and continue on donepezil. I have also made a referral to home health for PT and SLP for gait training and cognitive rehabilitation, respectively. She will then follow up in 6 months ,  or sooner if needed.   Total time of visit today: 40 minutes. As part of this visit I obtained additional history from the family which is incorporated in the HPI. I also discussed diagnosis, prognosis, diagnostic testing, evaluation, current status, treatment options and management as discussed above.           Anjelica Vergara PA-C

## 2022-07-29 ENCOUNTER — HOME HEALTH ADMISSION (OUTPATIENT)
Dept: HOME HEALTH SERVICES | Facility: HOME HEALTHCARE | Age: 82
End: 2022-07-29

## 2022-08-05 DIAGNOSIS — N39.41 URGE INCONTINENCE OF URINE: ICD-10-CM

## 2022-08-05 RX ORDER — MEMANTINE HYDROCHLORIDE 10 MG/1
10 TABLET ORAL 2 TIMES DAILY
Qty: 30 TABLET | Refills: 11 | OUTPATIENT
Start: 2022-08-05 | End: 2023-08-05

## 2022-08-05 NOTE — TELEPHONE ENCOUNTER
Rx Refill Note  Requested Prescriptions     Pending Prescriptions Disp Refills   • Mirabegron ER (Myrbetriq) 50 MG tablet sustained-release 24 hour 24 hr tablet 90 tablet 3     Sig: Take 50 mg by mouth Daily.      Last office visit with prescribing clinician: 5/4/2022      Next office visit with prescribing clinician: 8/18/2022            Arely Khalil MA  08/05/22, 15:12 EDT

## 2022-08-15 RX ORDER — MEMANTINE HYDROCHLORIDE 10 MG/1
10 TABLET ORAL 2 TIMES DAILY
Qty: 30 TABLET | Refills: 11 | Status: SHIPPED | OUTPATIENT
Start: 2022-08-15 | End: 2023-08-15

## 2022-08-15 NOTE — TELEPHONE ENCOUNTER
Caller: KEERTHI  Relationship: DAUGHTER  Best call back number: [phone]    Which medication are you concerned about: GARCÍANDA    Who prescribed you this medication: EVANGELINA MAYS PA-C    What are your concerns: SHE STATES SHE JUST GOT OFF THE PHONE WITH [facility] AND THEY STATE THEY DO NOT HAVE MEDICATION ON FILE, PLEASE REVIEW AND ADVISE.     PLEASE CALL PT'S DAUGHTER ONCE COMPLETED

## 2022-08-18 ENCOUNTER — OFFICE VISIT (OUTPATIENT)
Dept: CARDIOLOGY | Facility: HOSPITAL | Age: 82
End: 2022-08-18

## 2022-08-18 ENCOUNTER — OFFICE VISIT (OUTPATIENT)
Dept: FAMILY MEDICINE CLINIC | Facility: CLINIC | Age: 82
End: 2022-08-18

## 2022-08-18 ENCOUNTER — HOSPITAL ENCOUNTER (OUTPATIENT)
Dept: CARDIOLOGY | Facility: HOSPITAL | Age: 82
Discharge: HOME | End: 2022-08-18

## 2022-08-18 ENCOUNTER — LAB (OUTPATIENT)
Dept: LAB | Facility: HOSPITAL | Age: 82
End: 2022-08-18

## 2022-08-18 VITALS
OXYGEN SATURATION: 94 % | WEIGHT: 135.8 LBS | RESPIRATION RATE: 20 BRPM | SYSTOLIC BLOOD PRESSURE: 178 MMHG | BODY MASS INDEX: 24.99 KG/M2 | HEIGHT: 62 IN | HEART RATE: 60 BPM | TEMPERATURE: 96.6 F | DIASTOLIC BLOOD PRESSURE: 80 MMHG

## 2022-08-18 VITALS
SYSTOLIC BLOOD PRESSURE: 156 MMHG | DIASTOLIC BLOOD PRESSURE: 82 MMHG | HEIGHT: 62 IN | HEART RATE: 31 BPM | BODY MASS INDEX: 24.84 KG/M2 | WEIGHT: 135 LBS

## 2022-08-18 DIAGNOSIS — N39.0 FREQUENT UTI: ICD-10-CM

## 2022-08-18 DIAGNOSIS — N39.41 URGE INCONTINENCE OF URINE: ICD-10-CM

## 2022-08-18 DIAGNOSIS — I49.1 PREMATURE CONTRACTIONS, SUPRAVENTRICULAR: Primary | ICD-10-CM

## 2022-08-18 DIAGNOSIS — I49.9 IRREGULAR HEART BEAT: ICD-10-CM

## 2022-08-18 DIAGNOSIS — E03.9 ACQUIRED HYPOTHYROIDISM: ICD-10-CM

## 2022-08-18 DIAGNOSIS — I49.1 PAC (PREMATURE ATRIAL CONTRACTION): Primary | ICD-10-CM

## 2022-08-18 DIAGNOSIS — I49.49 PREMATURE CONTRACTION: ICD-10-CM

## 2022-08-18 DIAGNOSIS — I10 ESSENTIAL HYPERTENSION: Chronic | ICD-10-CM

## 2022-08-18 DIAGNOSIS — I10 ESSENTIAL HYPERTENSION: ICD-10-CM

## 2022-08-18 DIAGNOSIS — I49.1 PAC (PREMATURE ATRIAL CONTRACTION): ICD-10-CM

## 2022-08-18 LAB
ALBUMIN SERPL-MCNC: 4.2 G/DL (ref 3.5–5.2)
ALBUMIN/GLOB SERPL: 1.4 G/DL
ALP SERPL-CCNC: 101 U/L (ref 39–117)
ALT SERPL W P-5'-P-CCNC: 15 U/L (ref 1–33)
ANION GAP SERPL CALCULATED.3IONS-SCNC: 10.5 MMOL/L (ref 5–15)
AST SERPL-CCNC: 28 U/L (ref 1–32)
BILIRUB SERPL-MCNC: 0.3 MG/DL (ref 0–1.2)
BUN SERPL-MCNC: 14 MG/DL (ref 8–23)
BUN/CREAT SERPL: 12.8 (ref 7–25)
CALCIUM SPEC-SCNC: 11 MG/DL (ref 8.6–10.5)
CHLORIDE SERPL-SCNC: 104 MMOL/L (ref 98–107)
CO2 SERPL-SCNC: 25.5 MMOL/L (ref 22–29)
CREAT SERPL-MCNC: 1.09 MG/DL (ref 0.57–1)
EGFRCR SERPLBLD CKD-EPI 2021: 50.8 ML/MIN/1.73
GLOBULIN UR ELPH-MCNC: 2.9 GM/DL
GLUCOSE SERPL-MCNC: 80 MG/DL (ref 65–99)
MAGNESIUM SERPL-MCNC: 2 MG/DL (ref 1.6–2.4)
POTASSIUM SERPL-SCNC: 4.1 MMOL/L (ref 3.5–5.2)
PROT SERPL-MCNC: 7.1 G/DL (ref 6–8.5)
SODIUM SERPL-SCNC: 140 MMOL/L (ref 136–145)

## 2022-08-18 PROCEDURE — 80053 COMPREHEN METABOLIC PANEL: CPT | Performed by: FAMILY MEDICINE

## 2022-08-18 PROCEDURE — 99214 OFFICE O/P EST MOD 30 MIN: CPT | Performed by: NURSE PRACTITIONER

## 2022-08-18 PROCEDURE — 93000 ELECTROCARDIOGRAM COMPLETE: CPT | Performed by: FAMILY MEDICINE

## 2022-08-18 PROCEDURE — 84439 ASSAY OF FREE THYROXINE: CPT | Performed by: FAMILY MEDICINE

## 2022-08-18 PROCEDURE — 99214 OFFICE O/P EST MOD 30 MIN: CPT | Performed by: FAMILY MEDICINE

## 2022-08-18 PROCEDURE — 84443 ASSAY THYROID STIM HORMONE: CPT | Performed by: FAMILY MEDICINE

## 2022-08-18 PROCEDURE — 83735 ASSAY OF MAGNESIUM: CPT | Performed by: FAMILY MEDICINE

## 2022-08-18 PROCEDURE — 93246 EXT ECG>7D<15D RECORDING: CPT

## 2022-08-18 RX ORDER — METOPROLOL SUCCINATE 25 MG/1
12.5 TABLET, EXTENDED RELEASE ORAL NIGHTLY
Qty: 90 TABLET | Refills: 0 | Status: SHIPPED | OUTPATIENT
Start: 2022-08-18 | End: 2022-11-15 | Stop reason: SDUPTHER

## 2022-08-18 NOTE — PROGRESS NOTES
"Chief Complaint  Irregular Heart Beat and Establish Care    Subjective      History of Present Illness {CC  Problem List  Visit  Diagnosis   Encounters  Notes  Medications  Labs  Result Review Imaging  Media :23}     Maya Anand, 82 y.o. female with HTN, hypothyroidism, hyperparathyroidism, JOSE, memory loss presents to [facility] for Irregular Heart Beat and Establish Care.  Patient presents today upon referral from PCP Dr. Ysabel Stewart for PACs.  Patient completed a visit today with PCP, initial heart rate during vital signs was noted to be 31; subsequent ECG completed revealing normal sinus rhythm with frequent PACs.  Patient was referred to heart valve clinic for further evaluation.  CMP, magnesium, thyroid studies ordered by PCP.    Patient presents today feeling well overall from a cardiovascular standpoint.  She reports that she is asymptomatic with her PACs; denies symptoms of palpitations.  Family reports her blood pressures are generally well controlled at home.  Family reports she has had a couple of fall episodes at home, no loss of consciousness.  She denies symptoms of chest pain, dyspnea, palpitations/tachypalpitation, lightheadedness, syncope.  Family assists in her care due to memory decline.      Objective     Vital Signs:   Vitals:    08/18/22 1240 08/18/22 1242 08/18/22 1243   BP: 168/97 167/71 178/80   BP Location: Right arm Left arm Left arm   Patient Position: Sitting Standing Sitting   Cuff Size: Adult Adult Adult   Pulse: (!) 34 (!) 44 60   Resp: 18  20   Temp: 96.6 °F (35.9 °C) 96.6 °F (35.9 °C) 96.6 °F (35.9 °C)   TempSrc: Temporal Temporal Temporal   SpO2: (!) 88%  94%   Weight:   61.6 kg (135 lb 12.8 oz)   Height:   157.5 cm (62.01\")     Body mass index is 24.83 kg/m².  Physical Exam  Vitals and nursing note reviewed.   Constitutional:       Appearance: Normal appearance.   HENT:      Head: Normocephalic.   Eyes:      Extraocular " Movements: Extraocular movements intact.   Neck:      Vascular: No carotid bruit.   Cardiovascular:      Rate and Rhythm: Normal rate and regular rhythm.      Pulses: Normal pulses.      Heart sounds: Normal heart sounds, S1 normal and S2 normal. No murmur heard.  Pulmonary:      Effort: Pulmonary effort is normal. No respiratory distress.      Breath sounds: Normal breath sounds.   Musculoskeletal:      Cervical back: Neck supple.      Right lower leg: No edema.      Left lower leg: No edema.   Skin:     General: Skin is warm and dry.   Neurological:      General: No focal deficit present.      Mental Status: She is alert.   Psychiatric:         Mood and Affect: Mood normal.         Behavior: Behavior normal.         Thought Content: Thought content normal.        Data Reviewed:{ Labs  Result Review  Imaging  Med Tab  Media :23}     ECG 12 Lead (08/18/2022 11:58)    Progress Notes by Ysabel Wong MD (08/18/2022 11:30)  Comprehensive Metabolic Panel (05/07/2021 11:27)  CBC Auto Differential (05/07/2021 11:27)  T4, free (05/07/2021 11:27)  TSH (05/07/2021 11:27)      Assessment & Plan   Assessment and Plan {CC Problem List  Visit Diagnosis  ROS  Review (Popup)  Health Maintenance  Quality  BestPractice  Medications  SmartSets  SnapShot Encounters  Media :23}     1. PAC (premature atrial contraction)  -Newly diagnosed PACs on ECG at PCP office.  Asymptomatic with PACs.  No prior arrhythmia history.  -TTE for structural/functional evaluation  -14-day cardiac monitor for PAC burden, arrhythmia surveillance  -Initiate Toprol-XL 12.5 Mg nightly; cautious initiation given her resting heart rates low 60s on previous healthcare visits.  -Follow-up in approximately 3 weeks for symptom check, ECG    2. Essential hypertension  -Elevated at time of visit, likely compensatory with PACs.  -Initiate Toprol-XL as above  -Continue amlodipine      Follow Up {Instructions Charge Capture  Follow-up  Communications :23}     Return in about 3 weeks (around 9/8/2022) for Office follow-up, Recheck, BP check.      Patient was given instructions and counseling regarding her condition or for health maintenance advice. Please see specific information pulled into the AVS if appropriate.  Patient was instructed to call the [facility] Center with any questions, concerns, or worsening symptoms.    Dictated Utilizing Dragon Dictation   Please note that portions of this note were completed with a voice recognition program.   Part of this note may be an electronic transcription/translation of spoken language to printed text using the Dragon Dictation System.

## 2022-08-18 NOTE — PROGRESS NOTES
[facility] Heart Monitor Documentation    Maya Anand  1940  [id number]  08/18/22      [] ZIO XT Patch  Model [id number] Prescribed for 14 Days    · Serial Number: (N + 9 Digits) [id number]   · Apply-By Date on Box: 1/2/23  · USPS Tracking Number: [id number]  · USPS Tracking        [] [employer]ian MINI PLUS Mobile Cardiac Telemetry  Model BGMINIPLUS Prescribed for N/A Days    · Serial Number: (BGM + 7 Digits) BGM  · Shipped-By Date on Box:   · UPS Tracking Number: [id number]  · UPS Tracking      [] [employer] MINI Holter Monitor  Model BGMINIEL Prescribed for N/A Days    · Serial Number: (7 Digits)   · Shipped-By Date on Box:   · UPS Tracking Number: [id number]  · UPS Tracking        This monitor was applied to the patient's chest and checked for proper functioning.  Ms. Maya Anand was instructed in the proper use of this monitor.  She was given the opportunity to ask questions and left the office with the device 's instruction manual.    Christos Ferreira MA, 13:17 EDT, 08/18/22                  [facility] 8.8.2019  2

## 2022-08-18 NOTE — PROGRESS NOTES
"Chief Complaint  Hypertension (F/u ), Weight Check (Losing weight), and Urinary Tract Infection (Would like to get checked for UTI, has had some falls and dizziness. Has appt with urologist in September)    Subjective        Maya Anand presents to [facility]  History of Present Illness     Daughter reports she doesn't have much of appetite. Small breakfast. Larger lunch. Snack for dinner. Drinks coffee. Hates water. Likes decaf sweet tea and Sprite. She eats sweets all the time. She doesn't want to put weight on and she likes being thin.     She has had two falls in the past month. She had increased confusion 8/15/-8/16.     Blood pressure was controlled with increase amlodipine to 5mg.         Review of Systems   Respiratory: Negative for shortness of breath.    Cardiovascular: Negative for chest pain and palpitations.   Neurological: Negative for dizziness.       Objective   Vital Signs:  /82   Pulse (!) 31   Ht 157.5 cm (62.01\")   Wt 61.2 kg (135 lb)   BMI 24.68 kg/m²   Estimated body mass index is 24.68 kg/m² as calculated from the following:    Height as of this encounter: 157.5 cm (62.01\").    Weight as of this encounter: 61.2 kg (135 lb).    BMI is within normal parameters. No other follow-up for BMI required.      Physical Exam  Constitutional:       General: She is not in acute distress.     Appearance: She is not ill-appearing, toxic-appearing or diaphoretic.   Cardiovascular:      Rate and Rhythm: Normal rate. Rhythm irregular.   Pulmonary:      Effort: No respiratory distress.      Breath sounds: Normal breath sounds.   Neurological:      Mental Status: She is alert.   Psychiatric:         Cognition and Memory: Cognition is impaired. Memory is impaired.        Result Review :           ECG 12 Lead    Date/Time: 8/18/2022 11:58 AM  Performed by: Ysabel Wong MD  Authorized by: Ysabel Wong MD   Previous ECG: no previous ECG " available  Rhythm: sinus rhythm  Ectopy: atrial premature contractions  Rate comments: 61  Conduction: conduction normal    Clinical impression: abnormal EKG              Assessment and Plan   Diagnoses and all orders for this visit:    1. Premature contractions, supraventricular (Primary)  -     Ambulatory Referral to [facility] [place] [place]  -     Comprehensive Metabolic Panel; Future  -     Magnesium; Future  -     Comprehensive Metabolic Panel  -     Magnesium    2. Urge incontinence of urine  -     Cancel: POCT urinalysis dipstick, automated  -     Urine Culture - Urine, Urine, Clean Catch; Future  -     Cancel: Urine Culture - Urine, Urine, Clean Catch  -     Urine Culture - Urine, Urine, Clean Catch; Future    3. Frequent UTI  -     Cancel: POCT urinalysis dipstick, automated  -     Urine Culture - Urine, Urine, Clean Catch; Future  -     Cancel: Urine Culture - Urine, Urine, Clean Catch  -     Urine Culture - Urine, Urine, Clean Catch; Future    4. Essential hypertension  -     ECG 12 Lead  -     Comprehensive Metabolic Panel; Future  -     Comprehensive Metabolic Panel    5. Irregular heart beat  -     ECG 12 Lead  -     Ambulatory Referral to [facility] [place] [place]    6. Acquired hypothyroidism  -     TSH; Future  -     T4, Free; Future  -     TSH  -     T4, Free      HR 31 on initial vital signs. Upon entering the room I immediately performed a cardiopulmonary exam and noted HR to be irregular. Asymptomatic. EKG done showing frequent premature contractions and pauses. Labs today. Urgent referral to [facility]. Advised daughter if she has dizziness, syncope, chest pain, shortness of breath at home to call ambulance.     She was unable to void in the office for urine testing.  She has frequent UTIs with her most recent urine culture positive at ER visit [place] in June.  Family was provided with a specimen cup to obtain at home and return to the lab.    Weight loss likely  related with decreased p.o. intake.  Discussed the importance of a high-protein diet and using supplement shakes in different flavors to her taste.  She also does not drink water and is at risk for dehydration which we will check with labs today.  Discussed possibility of using an appetite stimulant medication but daughter deferred at this time.         Follow Up   No follow-ups on file.  Patient was given instructions and counseling regarding her condition or for health maintenance advice. Please see specific information pulled into the AVS if appropriate.     Electronically signed by Ysabel Stewart MD, 08/18/22, 12:28 PM EDT.

## 2022-08-18 NOTE — PATIENT INSTRUCTIONS
-Start Toprol-XL 12.5mg tonight    - Office will call to schedule testing or schedule at checkout    - Office will call or send message with testing results

## 2022-08-19 ENCOUNTER — APPOINTMENT (OUTPATIENT)
Dept: CARDIOLOGY | Facility: HOSPITAL | Age: 82
End: 2022-08-19

## 2022-08-19 ENCOUNTER — LAB (OUTPATIENT)
Dept: LAB | Facility: HOSPITAL | Age: 82
End: 2022-08-19

## 2022-08-19 DIAGNOSIS — N39.0 FREQUENT UTI: ICD-10-CM

## 2022-08-19 DIAGNOSIS — N39.41 URGE INCONTINENCE OF URINE: ICD-10-CM

## 2022-08-19 LAB
T4 FREE SERPL-MCNC: 1.35 NG/DL (ref 0.93–1.7)
TSH SERPL DL<=0.05 MIU/L-ACNC: 2.58 UIU/ML (ref 0.27–4.2)

## 2022-08-19 PROCEDURE — 87186 SC STD MICRODIL/AGAR DIL: CPT

## 2022-08-19 PROCEDURE — 87086 URINE CULTURE/COLONY COUNT: CPT

## 2022-08-19 PROCEDURE — 87077 CULTURE AEROBIC IDENTIFY: CPT

## 2022-08-21 ENCOUNTER — TELEPHONE (OUTPATIENT)
Dept: FAMILY MEDICINE CLINIC | Facility: CLINIC | Age: 82
End: 2022-08-21

## 2022-08-21 DIAGNOSIS — N39.0 ACUTE UTI: Primary | ICD-10-CM

## 2022-08-21 LAB — BACTERIA SPEC AEROBE CULT: ABNORMAL

## 2022-08-21 RX ORDER — NITROFURANTOIN 25; 75 MG/1; MG/1
100 CAPSULE ORAL EVERY 12 HOURS SCHEDULED
Qty: 10 CAPSULE | Refills: 0 | Status: SHIPPED | OUTPATIENT
Start: 2022-08-21 | End: 2022-08-26

## 2022-08-22 NOTE — TELEPHONE ENCOUNTER
Urine culture has E. Coli. I sent macrobid antibiotic to the pharmacy. Please return to the lab next week for another urine culture to make sure the infection has cleared.

## 2022-08-24 ENCOUNTER — HOSPITAL ENCOUNTER (OUTPATIENT)
Dept: CARDIOLOGY | Facility: HOSPITAL | Age: 82
Discharge: HOME | End: 2022-08-24
Admitting: NURSE PRACTITIONER

## 2022-08-24 VITALS — BODY MASS INDEX: 24.99 KG/M2 | HEIGHT: 62 IN | WEIGHT: 135.8 LBS

## 2022-08-24 DIAGNOSIS — I49.1 PAC (PREMATURE ATRIAL CONTRACTION): ICD-10-CM

## 2022-08-24 DIAGNOSIS — I10 ESSENTIAL HYPERTENSION: ICD-10-CM

## 2022-08-24 LAB
BH CV ECHO MEAS - AO MAX PG: 8.8 MMHG
BH CV ECHO MEAS - AO MEAN PG: 5 MMHG
BH CV ECHO MEAS - AO ROOT DIAM: 2.7 CM
BH CV ECHO MEAS - AO V2 MAX: 148 CM/SEC
BH CV ECHO MEAS - AO V2 VTI: 38.3 CM
BH CV ECHO MEAS - AVA(I,D): 1.22 CM2
BH CV ECHO MEAS - EDV(CUBED): 97.3 ML
BH CV ECHO MEAS - EDV(MOD-SP2): 65.3 ML
BH CV ECHO MEAS - EDV(MOD-SP4): 65.1 ML
BH CV ECHO MEAS - EF(MOD-BP): 58.8 %
BH CV ECHO MEAS - EF(MOD-SP2): 59.3 %
BH CV ECHO MEAS - EF(MOD-SP4): 57.3 %
BH CV ECHO MEAS - ESV(CUBED): 24.4 ML
BH CV ECHO MEAS - ESV(MOD-SP2): 26.6 ML
BH CV ECHO MEAS - ESV(MOD-SP4): 27.8 ML
BH CV ECHO MEAS - FS: 37 %
BH CV ECHO MEAS - IVS/LVPW: 1.11 CM
BH CV ECHO MEAS - IVSD: 1 CM
BH CV ECHO MEAS - LA DIMENSION: 3.5 CM
BH CV ECHO MEAS - LAT PEAK E' VEL: 12.8 CM/SEC
BH CV ECHO MEAS - LV DIASTOLIC VOL/BSA (35-75): 40.3 CM2
BH CV ECHO MEAS - LV MASS(C)D: 148.1 GRAMS
BH CV ECHO MEAS - LV MAX PG: 3.1 MMHG
BH CV ECHO MEAS - LV MEAN PG: 2 MMHG
BH CV ECHO MEAS - LV SYSTOLIC VOL/BSA (12-30): 17.2 CM2
BH CV ECHO MEAS - LV V1 MAX: 87.8 CM/SEC
BH CV ECHO MEAS - LV V1 VTI: 18.3 CM
BH CV ECHO MEAS - LVIDD: 4.6 CM
BH CV ECHO MEAS - LVIDS: 2.9 CM
BH CV ECHO MEAS - LVOT AREA: 2.5 CM2
BH CV ECHO MEAS - LVOT DIAM: 1.8 CM
BH CV ECHO MEAS - LVPWD: 0.9 CM
BH CV ECHO MEAS - MED PEAK E' VEL: 13.5 CM/SEC
BH CV ECHO MEAS - MR MAX PG: 46.8 MMHG
BH CV ECHO MEAS - MR MAX VEL: 342 CM/SEC
BH CV ECHO MEAS - MV A MAX VEL: 52.5 CM/SEC
BH CV ECHO MEAS - MV DEC SLOPE: 558 CM/SEC2
BH CV ECHO MEAS - MV DEC TIME: 0.15 MSEC
BH CV ECHO MEAS - MV E MAX VEL: 120.5 CM/SEC
BH CV ECHO MEAS - MV E/A: 2.3
BH CV ECHO MEAS - MV MAX PG: 6.9 MMHG
BH CV ECHO MEAS - MV MEAN PG: 2 MMHG
BH CV ECHO MEAS - MV P1/2T: 67.2 MSEC
BH CV ECHO MEAS - MV V2 VTI: 36.6 CM
BH CV ECHO MEAS - MVA(P1/2T): 3.3 CM2
BH CV ECHO MEAS - MVA(VTI): 1.27 CM2
BH CV ECHO MEAS - PA ACC TIME: 0.21 SEC
BH CV ECHO MEAS - PA PR(ACCEL): -14.6 MMHG
BH CV ECHO MEAS - PA V2 MAX: 103 CM/SEC
BH CV ECHO MEAS - RAP SYSTOLE: 3 MMHG
BH CV ECHO MEAS - RVSP: 23 MMHG
BH CV ECHO MEAS - SI(MOD-SP2): 24 ML/M2
BH CV ECHO MEAS - SI(MOD-SP4): 23.1 ML/M2
BH CV ECHO MEAS - SV(LVOT): 46.6 ML
BH CV ECHO MEAS - SV(MOD-SP2): 38.7 ML
BH CV ECHO MEAS - SV(MOD-SP4): 37.3 ML
BH CV ECHO MEAS - TAPSE (>1.6): 2.05 CM
BH CV ECHO MEAS - TR MAX PG: 20 MMHG
BH CV ECHO MEAS - TR MAX VEL: 222.3 CM/SEC
BH CV ECHO MEASUREMENTS AVERAGE E/E' RATIO: 9.16
BH CV VAS BP RIGHT ARM: NORMAL MMHG
BH CV XLRA - RV BASE: 3.7 CM
BH CV XLRA - RV LENGTH: 6.5 CM
BH CV XLRA - RV MID: 2.6 CM
BH CV XLRA - TDI S': 16.8 CM/SEC
LEFT ATRIUM VOLUME INDEX: 28.1 ML/M2
MAXIMAL PREDICTED HEART RATE: 138 BPM
STRESS TARGET HR: 117 BPM

## 2022-08-24 PROCEDURE — 93306 TTE W/DOPPLER COMPLETE: CPT | Performed by: INTERNAL MEDICINE

## 2022-08-24 PROCEDURE — 93306 TTE W/DOPPLER COMPLETE: CPT

## 2022-08-24 NOTE — PROGRESS NOTES
Your echo is within normal limits without any significant valve abnormalities.  Please keep your follow-up with GABE Mandujano and let us know if you have any further questions or concerns

## 2022-08-25 DIAGNOSIS — I10 ESSENTIAL HYPERTENSION: ICD-10-CM

## 2022-08-25 RX ORDER — DONEPEZIL HYDROCHLORIDE 10 MG/1
10 TABLET, FILM COATED ORAL DAILY
Qty: 90 TABLET | Refills: 3 | Status: SHIPPED | OUTPATIENT
Start: 2022-08-25 | End: 2023-08-25

## 2022-08-25 RX ORDER — AMLODIPINE BESYLATE 5 MG/1
5 TABLET ORAL DAILY
Qty: 30 TABLET | Refills: 0 | Status: SHIPPED | OUTPATIENT
Start: 2022-08-25 | End: 2022-11-02

## 2022-08-25 NOTE — TELEPHONE ENCOUNTER
Rx Refill Note  Requested Prescriptions     Pending Prescriptions Disp Refills   • amLODIPine (NORVASC) 5 MG tablet 30 tablet 0     Sig: Take 1 tablet by mouth Daily.      Last office visit with prescribing clinician: 08/18/2022    Next office visit with prescribing clinician: 12/8/20223}  Marni Aguilar MA  08/25/22, 14:01 EDT     Last fill: 07/11/2022

## 2022-09-08 ENCOUNTER — OFFICE VISIT (OUTPATIENT)
Dept: CARDIOLOGY | Facility: HOSPITAL | Age: 82
End: 2022-09-08

## 2022-09-08 VITALS
HEIGHT: 62 IN | RESPIRATION RATE: 18 BRPM | SYSTOLIC BLOOD PRESSURE: 111 MMHG | BODY MASS INDEX: 24.38 KG/M2 | DIASTOLIC BLOOD PRESSURE: 54 MMHG | OXYGEN SATURATION: 97 % | WEIGHT: 132.5 LBS | HEART RATE: 66 BPM

## 2022-09-08 DIAGNOSIS — F32.5 MAJOR DEPRESSION IN REMISSION: ICD-10-CM

## 2022-09-08 DIAGNOSIS — F41.1 GAD (GENERALIZED ANXIETY DISORDER): ICD-10-CM

## 2022-09-08 PROCEDURE — 99214 OFFICE O/P EST MOD 30 MIN: CPT | Performed by: NURSE PRACTITIONER

## 2022-09-08 RX ORDER — ESCITALOPRAM OXALATE 10 MG/1
10 TABLET ORAL EVERY MORNING
Qty: 90 TABLET | Refills: 0 | Status: SHIPPED | OUTPATIENT
Start: 2022-09-08 | End: 2022-12-08 | Stop reason: SDUPTHER

## 2022-09-08 NOTE — PROGRESS NOTES
"Chief Complaint  Follow-up and pac    Subjective      History of Present Illness {CC  Problem List  Visit  Diagnosis   Encounters  Notes  Medications  Labs  Result Review Imaging  Media :23}     Maya Anand, 82 y.o. female with HTN, hypothyroidism, hyperparathyroidism, JOSE, memory loss presents to [facility] and Valve clinic for Follow-up and pac.  Patient presents today upon referral from PCP Dr. Ysabel Stewart for PACs.  Patient completed a visit today with PCP, initial heart rate during vital signs was noted to be 31; subsequent ECG completed revealing normal sinus rhythm with frequent PACs.  Patient was referred to heart valve clinic for further evaluation.  CMP, magnesium, thyroid studies ordered by PCP.    Since previous evaluation patient completed echocardiogram that revealed normal LV systolic function. Holter monitor has been returned, results pending.    Patient presents today feeling well overall from a cardiovascular standpoint.  She denies symptoms of palpitations. She denies symptoms of chest pain, dyspnea, palpitations/tachypalpitation, lightheadedness, syncope.  Family assists in her care due to memory decline.      Objective     Vital Signs:   Vitals:    09/08/22 1319 09/08/22 1356   BP: 111/54    BP Location: Left arm    Patient Position: Sitting    Cuff Size: Adult    Pulse: 53 66   Resp: 18    SpO2: 97%    Weight: 60.1 kg (132 lb 8 oz)    Height: 157.5 cm (62\")      Body mass index is 24.23 kg/m².  Physical Exam  Vitals and nursing note reviewed.   Constitutional:       Appearance: Normal appearance.   HENT:      Head: Normocephalic.   Eyes:      Extraocular Movements: Extraocular movements intact.   Neck:      Vascular: No carotid bruit.   Cardiovascular:      Rate and Rhythm: Normal rate and regular rhythm.      Pulses: Normal pulses.      Heart sounds: Normal heart sounds, S1 normal and S2 normal. No murmur heard.  Pulmonary:      Effort: Pulmonary effort is " normal. No respiratory distress.      Breath sounds: Normal breath sounds.   Musculoskeletal:      Cervical back: Neck supple.      Right lower leg: No edema.      Left lower leg: No edema.   Skin:     General: Skin is warm and dry.   Neurological:      General: No focal deficit present.      Mental Status: She is alert.   Psychiatric:         Mood and Affect: Mood normal.         Behavior: Behavior normal.         Thought Content: Thought content normal.        Data Reviewed:{ Labs  Result Review  Imaging  Med Tab  Media :23}     ECG 12 Lead (08/18/2022 11:58)    Progress Notes by Ysabel Wong MD (08/18/2022 11:30)  Comprehensive Metabolic Panel (05/07/2021 11:27)  CBC Auto Differential (05/07/2021 11:27)  T4, free (05/07/2021 11:27)  TSH (05/07/2021 11:27)      Assessment & Plan   Assessment and Plan {CC Problem List  Visit Diagnosis  ROS  Review (Popup)  Health Maintenance  Quality  BestPractice  Medications  SmartSets  SnapShot Encounters  Media :23}     1. PAC (premature atrial contraction)  -Newly diagnosed PACs on ECG at PCP office.  Asymptomatic with PACs.  No prior arrhythmia history.  -TTE with normal LV systolic function.  -14-day cardiac monitor for PAC burden, arrhythmia surveillance; results pending.  -Rare PAC on exam today, improved since previous evaluation  -Continue Toprol-XL 12.5 Mg nightly; cautious initiation given her resting heart rates low 60s on previous healthcare visits.  -Follow-up in approximately 2 weeks for symptom check, monitor results review    2. Essential hypertension  -Well-controlled today, improved since previous evaluation  -Continue amlodipine      Follow Up {Instructions Charge Capture  Follow-up Communications :23}     Return in about 2 weeks (around 9/22/2022) for Office follow-up, Monitor results.      Patient was given instructions and counseling regarding her condition or for health maintenance advice. Please see specific information  pulled into the AVS if appropriate.  Patient was instructed to call the Heart [facility] Center with any questions, concerns, or worsening symptoms.    Dictated Utilizing Dragon Dictation   Please note that portions of this note were completed with a voice recognition program.   Part of this note may be an electronic transcription/translation of spoken language to printed text using the Dragon Dictation System.

## 2022-09-19 ENCOUNTER — PRIOR AUTHORIZATION (OUTPATIENT)
Dept: FAMILY MEDICINE CLINIC | Facility: CLINIC | Age: 82
End: 2022-09-19

## 2022-09-20 ENCOUNTER — OFFICE VISIT (OUTPATIENT)
Dept: UROLOGY | Facility: CLINIC | Age: 82
End: 2022-09-20

## 2022-09-20 VITALS — HEIGHT: 62 IN | WEIGHT: 135 LBS | BODY MASS INDEX: 24.84 KG/M2

## 2022-09-20 DIAGNOSIS — R32 URINARY INCONTINENCE, UNSPECIFIED TYPE: ICD-10-CM

## 2022-09-20 DIAGNOSIS — N39.0 FREQUENT UTI: ICD-10-CM

## 2022-09-20 PROCEDURE — 51798 US URINE CAPACITY MEASURE: CPT | Performed by: UROLOGY

## 2022-09-20 PROCEDURE — 99205 OFFICE O/P NEW HI 60 MIN: CPT | Performed by: UROLOGY

## 2022-09-20 NOTE — PROGRESS NOTES
Office Visit [facility]      Patient Name: Maya Anand  : 1940   MRN: [id number]     Chief Complaint:    Referring Provider: Lashawn Wong*    History of Present Illness: Maya Anand is a 82 y.o. female who presents to Urology today for ***      Subjective      Review of System: Review of Systems   Constitutional: Negative for chills, fatigue, fever and unexpected weight change.   HENT: Positive for hearing loss. Negative for sore throat.    Eyes: Negative for visual disturbance.   Respiratory: Negative for cough, chest tightness and shortness of breath.    Cardiovascular: Negative for chest pain and leg swelling.   Gastrointestinal: Negative for blood in stool, constipation, diarrhea, nausea, rectal pain and vomiting.   Genitourinary: Positive for frequency and urgency. Negative for decreased urine volume, difficulty urinating, dysuria, enuresis, flank pain, genital sores and hematuria.   Musculoskeletal: Negative for back pain and joint swelling.   Skin: Negative for rash and wound.   Neurological: Negative for seizures, speech difficulty, weakness and headaches.   Psychiatric/Behavioral: Negative for confusion, sleep disturbance and suicidal ideas. The patient is not nervous/anxious.       I have reviewed the ROS documented by my clinical staff, updated appropriately and I agree. Sol Reid MA    Past Medical History:   Past Medical History:   Diagnosis Date   • Acid reflux    • Anxiety    • Chronic cerebral ischemia    • Depression    • Frequent UTI    • JOSE (generalized anxiety disorder)    • Herpes simplex    • HTN (hypertension)    • Hypothyroidism    • Memory loss    • Ovarian cyst    • Peripheral edema        Past Surgical History:   Past Surgical History:   Procedure Laterality Date   • COLONOSCOPY W/ BIOPSIES     • OVARIAN CYST REMOVAL     • TOTAL KNEE ARTHROPLASTY Bilateral ,        Family History:   Family History   Problem Relation Age of Onset   •  Stroke Father        Social History:   Social History     Socioeconomic History   • Marital status:    Tobacco Use   • Smoking status: Former Smoker     Packs/day: 0.25     Years: 5.00     Pack years: 1.25     Types: Cigarettes     Quit date:      Years since quittin.7   • Smokeless tobacco: Never Used   Vaping Use   • Vaping Use: Never used   Substance and Sexual Activity   • Alcohol use: No   • Drug use: Never   • Sexual activity: Defer       Medications:     Current Outpatient Medications:   •  acyclovir (ZOVIRAX) 400 MG tablet, Take 1 tablet by mouth 3 (Three) Times a Day. (Patient taking differently: Take 400 mg by mouth As Needed.), Disp: 270 tablet, Rfl: 3  •  amLODIPine (NORVASC) 5 MG tablet, Take 1 tablet by mouth Daily., Disp: 30 tablet, Rfl: 0  •  Apoaequorin (Prevagen) 10 MG capsule, Take 20 mg by mouth Daily., Disp: , Rfl:   •  cyanocobalamin (VITAMIN B-12) 500 MCG tablet, Take 1,000 mcg by mouth Daily., Disp: , Rfl:   •  donepezil (Aricept) 10 MG tablet, Take 1 tablet by mouth Daily., Disp: 90 tablet, Rfl: 3  •  escitalopram (LEXAPRO) 10 MG tablet, Take 1 tablet by mouth Every Morning., Disp: 90 tablet, Rfl: 0  •  levothyroxine (SYNTHROID, LEVOTHROID) 75 MCG tablet, Take 1 tablet by mouth Every Morning., Disp: 90 tablet, Rfl: 3  •  memantine (NAMENDA) 10 MG tablet, Take 1 tablet by mouth 2 (Two) Times a Day., Disp: 30 tablet, Rfl: 11  •  metoprolol succinate XL (TOPROL-XL) 25 MG 24 hr tablet, Take 0.5 tablets by mouth Every Night., Disp: 90 tablet, Rfl: 0  •  Mirabegron ER (Myrbetriq) 50 MG tablet sustained-release 24 hour 24 hr tablet, Take 50 mg by mouth Daily., Disp: 90 tablet, Rfl: 3  •  Multiple Vitamins-Minerals (PRESERVISION AREDS 2 PO), Take  by mouth Daily., Disp: , Rfl:     Allergies:   Allergies   Allergen Reactions   • Erythromycin Unknown - Low Severity     unknown   • Penicillins            Post void residual bladder scan:   103mL       Objective     Physical Exam:   Vital  "Signs: There were no vitals filed for this visit.  There is no height or weight on file to calculate BMI.     Physical Exam    Genitourinary  Penis: {Desc; circumcised/uncircumcised:5705::\"circumcised\"} penis, glans normal, no penile discharge.  No rashes/lesions.    Testes: descended bilaterally, no masses, {Desc; tender/non:24818} to palpation. Remainder of scrotal contents normal. No hernia appreciated.  Rectal:  Normal tone, no masses.  Prostate:  {NUMBERS; 5-50 BY 5:10871} grams.  Symmetric, non-tender, anodular and no induration.      Labs:   Brief Urine Lab Results  (Last result in the past 365 days)      Color   Clarity   Blood   Leuk Est   Nitrite   Protein   CREAT   Urine HCG        05/04/22 0942 Dark Yellow   Cloudy   Trace   Trace   Positive   1+                 Urine Culture    Urine Culture 5/4/22 8/19/22   Urine Culture >100,000 CFU/mL Escherichia coli (A) >100,000 CFU/mL Escherichia coli (A)   (A) Abnormal value               Lab Results   Component Value Date    GLUCOSE 80 08/18/2022    CALCIUM 11.0 (H) 08/18/2022     08/18/2022    K 4.1 08/18/2022    CO2 25.5 08/18/2022     08/18/2022    BUN 14 08/18/2022    CREATININE 1.09 (H) 08/18/2022    EGFRIFNONA 32 (L) 05/07/2021    BCR 12.8 08/18/2022    ANIONGAP 10.5 08/18/2022       Lab Results   Component Value Date    WBC 7.61 05/07/2021    HGB 11.2 (L) 05/07/2021    HCT 35.6 05/07/2021    MCV 91.3 05/07/2021     05/07/2021       Images:   No Images in the past 120 days found..    Measures:   Tobacco:   Maya Anand  reports that she quit smoking about 41 years ago. Her smoking use included cigarettes. She has a 1.25 pack-year smoking history. She has never used smokeless tobacco.. I have educated her on the risk of diseases from using tobacco products such as {Tobacco Cessation Diseases:30151::\"cancer\",\"COPD\",\"heart disease\"}.     I advised her to quit and she is {Willing/Not Willing to Quit Tobacco Products:89257}.    I spent " {Time Spent Tobacco :37300} minutes counseling the patient.           Urine Incontinence: ( NOUI)  ***   Assessment / Plan      Assessment/Plan:   82 y.o. female is here today for ***    There are no diagnoses linked to this encounter.       Follow Up:   No follow-ups on file.    I spent approximately *** minutes providing clinical care for this patient; including review of patient's chart and provider documentation, face to face time spent with patient in examination room (obtaining history, performing physical exam, discussing diagnosis and management options), placing orders, and completing patient documentation.     Karl Stone MD  [facility] [place]

## 2022-09-21 ENCOUNTER — PATIENT ROUNDING (BHMG ONLY) (OUTPATIENT)
Dept: UROLOGY | Facility: CLINIC | Age: 82
End: 2022-09-21

## 2022-09-21 NOTE — PROGRESS NOTES
A [facility] message has been sent to the patient for PATIENT ROUNDING with [facility].

## 2022-09-22 ENCOUNTER — OFFICE VISIT (OUTPATIENT)
Dept: CARDIOLOGY | Facility: HOSPITAL | Age: 82
End: 2022-09-22

## 2022-09-22 VITALS
OXYGEN SATURATION: 97 % | HEIGHT: 62 IN | SYSTOLIC BLOOD PRESSURE: 125 MMHG | HEART RATE: 52 BPM | RESPIRATION RATE: 17 BRPM | TEMPERATURE: 96.9 F | BODY MASS INDEX: 23.92 KG/M2 | DIASTOLIC BLOOD PRESSURE: 59 MMHG | WEIGHT: 130 LBS

## 2022-09-22 DIAGNOSIS — I49.1 PAC (PREMATURE ATRIAL CONTRACTION): Primary | ICD-10-CM

## 2022-09-22 PROCEDURE — 99214 OFFICE O/P EST MOD 30 MIN: CPT | Performed by: NURSE PRACTITIONER

## 2022-09-22 NOTE — PROGRESS NOTES
"Chief Complaint  Follow-up and Monitor Results    Subjective      History of Present Illness {CC  Problem List  Visit  Diagnosis   Encounters  Notes  Medications  Labs  Result Review Imaging  Media :23}     Maya Anand, 82 y.o. female with HTN, hypothyroidism, hyperparathyroidism, JOSE, memory loss presents to [facility] for Follow-up and Monitor Results.  Patient presents today upon referral from PCP Dr. Ysabel Stewart for PACs.  Patient completed a visit today with PCP, initial heart rate during vital signs was noted to be 31; subsequent ECG completed revealing normal sinus rhythm with frequent PACs.  Patient was referred to heart valve clinic for further evaluation.  CMP, magnesium, thyroid studies ordered by PCP. Patient was initiated on low dose Toprol-XL, and echocardiogram completed and revealed normal LV systolic function. Holter monitor preliminary data with heart rate average 58, minimum 44, maximum 146. Short duration SVT with longest 10 beats.  No evidence of VT, atrial fibrillation, pauses, AV block. Low burden PAC less than 1%, frequent PACs with burden of 18.6%. Toprol-XL was prescribed during monitoring period.    Patient presents today feeling well overall from a cardiovascular standpoint.  She denies symptoms of palpitations. She denies symptoms of chest pain, dyspnea, palpitations/tachypalpitation, lightheadedness, syncope.  Family assists in her care due to memory decline.      Objective     Vital Signs:   Vitals:    09/22/22 0825   BP: 125/59   BP Location: Left arm   Patient Position: Sitting   Cuff Size: Adult   Pulse: 52   Resp: 17   Temp: 96.9 °F (36.1 °C)   TempSrc: Temporal   SpO2: 97%   Weight: 59 kg (130 lb)   Height: 157.5 cm (62.01\")     Body mass index is 23.77 kg/m².  Physical Exam  Vitals and nursing note reviewed.   Constitutional:       Appearance: Normal appearance.   HENT:      Head: Normocephalic.   Eyes:      Extraocular " Movements: Extraocular movements intact.   Neck:      Vascular: No carotid bruit.   Cardiovascular:      Rate and Rhythm: Normal rate and regular rhythm.      Pulses: Normal pulses.      Heart sounds: Normal heart sounds, S1 normal and S2 normal. No murmur heard.  Pulmonary:      Effort: Pulmonary effort is normal. No respiratory distress.      Breath sounds: Normal breath sounds.   Musculoskeletal:      Cervical back: Neck supple.      Right lower leg: No edema.      Left lower leg: No edema.   Skin:     General: Skin is warm and dry.   Neurological:      General: No focal deficit present.      Mental Status: She is alert.   Psychiatric:         Mood and Affect: Mood normal.         Behavior: Behavior normal.         Thought Content: Thought content normal.        Data Reviewed:{ Labs  Result Review  Imaging  Med Tab  Media :23}     Adult Transthoracic Echo Complete W/ Cont if Necessary Per Protocol (08/24/2022 09:50)  -Preliminary Holter monitor data noted in HPI  ECG 12 Lead (08/18/2022 11:58)    Progress Notes by Ysabel Wong MD (08/18/2022 11:30)  Comprehensive Metabolic Panel (05/07/2021 11:27)  CBC Auto Differential (05/07/2021 11:27)  T4, free (05/07/2021 11:27)  TSH (05/07/2021 11:27)      Assessment & Plan   Assessment and Plan {CC Problem List  Visit Diagnosis  ROS  Review (Popup)  Health Maintenance  Quality  BestPractice  Medications  SmartSets  SnapShot Encounters  Media :23}     1. PAC (premature atrial contraction)  -Newly diagnosed PACs on ECG at PCP office.  Asymptomatic with PACs.  No prior arrhythmia history.  -Holter monitor preliminary data with heart rate average 58, minimum 44, maximum 146. Short duration SVT with longest 10 beats.  No evidence of VT, atrial fibrillation, pauses, AV block. Low burden PAC less than 1%, frequent PACs with burden of 18.6%.  -TTE with normal LV systolic function.  -No PAC on exam today, improved since previous evaluation  -Continue  Toprol-XL 12.5 Mg nightly; will defer uptitration due to average heart rate of 58 and overall asymptomatic.  -Referral to cardiology for surveillance    2. Essential hypertension  -Well-controlled today, improved since previous evaluation  -Continue amlodipine      Follow Up {Instructions Charge Capture  Follow-up Communications :23}     Return if symptoms worsen or fail to improve.      Patient was given instructions and counseling regarding her condition or for health maintenance advice. Please see specific information pulled into the AVS if appropriate.  Patient was instructed to call the [facility] with any questions, concerns, or worsening symptoms.    Dictated Utilizing Dragon Dictation   Please note that portions of this note were completed with a voice recognition program.   Part of this note may be an electronic transcription/translation of spoken language to printed text using the Dragon Dictation System.

## 2022-09-25 NOTE — PROGRESS NOTES
LUTS Female Office Visit      Patient Name: Maya Anand  : 1940   MRN: [id number]     Chief Complaint:  Lower Urinary Tract Symptoms.     Referring Provider: Lorenzo Wong    History of Present Illness: Mrs. Anand is a 82 y.o. female with history of lower urinary tract symptoms.  Past medical history includes hypertension, hypothyroidism, anxiety, depression, Alzheimer's dementia.  The patient is accompanied by her  as well as her daughter who aided in her medical history due to her cognitive status.  She presents today for evaluation of lower urinary tract symptoms, frequency, urgency and urge associated incontinence.  She reports significant bother associated with symptoms primarily urge associated incontinence.  She denies obstructive based urinary symptoms.    Denies hematuria.  PVR today 103 mL.  She denies past urologic evaluation including instrumentation or procedure.    The patient has been initiated on Myrbetriq 50 mg for above symptoms by primary care physician.  She reports only mild symptomatic improvement on medication.  She denies significant irritative fluid intake.  Denies history of constipation.      Subjective      Review of System: Review of Systems   Constitutional: Negative for chills, fatigue, fever and unexpected weight change.   HENT: Negative for sore throat.    Eyes: Negative for visual disturbance.   Respiratory: Negative for cough, chest tightness and shortness of breath.    Cardiovascular: Negative for chest pain and leg swelling.   Gastrointestinal: Negative for blood in stool, constipation, diarrhea, nausea, rectal pain and vomiting.   Genitourinary: Negative for decreased urine volume, difficulty urinating, dysuria, enuresis, flank pain, frequency, genital sores, hematuria and urgency.   Musculoskeletal: Negative for back pain and joint swelling.   Skin: Negative for rash and wound.   Neurological: Negative for seizures, speech difficulty,  weakness and headaches.   Psychiatric/Behavioral: Negative for confusion, sleep disturbance and suicidal ideas. The patient is not nervous/anxious.       I have reviewed the ROS documented by my clinical staff, updated appropriate and I agree. Karl Stone MD    Past Medical History:  Past Medical History:   Diagnosis Date   • Acid reflux    • Anxiety    • Chronic cerebral ischemia    • Depression    • Frequent UTI    • JOSE (generalized anxiety disorder)    • Herpes simplex    • HTN (hypertension)    • Hypothyroidism    • Memory loss    • Ovarian cyst    • Peripheral edema        Past Surgical History:  Past Surgical History:   Procedure Laterality Date   • COLONOSCOPY W/ BIOPSIES  2016   • OVARIAN CYST REMOVAL     • TOTAL KNEE ARTHROPLASTY Bilateral 2010, 2012       Medications:    Current Outpatient Medications:   •  acyclovir (ZOVIRAX) 400 MG tablet, Take 1 tablet by mouth 3 (Three) Times a Day. (Patient taking differently: Take 400 mg by mouth As Needed.), Disp: 270 tablet, Rfl: 3  •  amLODIPine (NORVASC) 5 MG tablet, Take 1 tablet by mouth Daily., Disp: 30 tablet, Rfl: 0  •  Apoaequorin (Prevagen) 10 MG capsule, Take 20 mg by mouth Daily., Disp: , Rfl:   •  cyanocobalamin (VITAMIN B-12) 500 MCG tablet, Take 1,000 mcg by mouth Daily., Disp: , Rfl:   •  donepezil (Aricept) 10 MG tablet, Take 1 tablet by mouth Daily., Disp: 90 tablet, Rfl: 3  •  escitalopram (LEXAPRO) 10 MG tablet, Take 1 tablet by mouth Every Morning., Disp: 90 tablet, Rfl: 0  •  levothyroxine (SYNTHROID, LEVOTHROID) 75 MCG tablet, Take 1 tablet by mouth Every Morning., Disp: 90 tablet, Rfl: 3  •  memantine (NAMENDA) 10 MG tablet, Take 1 tablet by mouth 2 (Two) Times a Day., Disp: 30 tablet, Rfl: 11  •  metoprolol succinate XL (TOPROL-XL) 25 MG 24 hr tablet, Take 0.5 tablets by mouth Every Night., Disp: 90 tablet, Rfl: 0  •  Mirabegron ER (Myrbetriq) 50 MG tablet sustained-release 24 hour 24 hr tablet, Take 50 mg by mouth Daily., Disp: 90  "tablet, Rfl: 3  •  Multiple Vitamins-Minerals (PRESERVISION AREDS 2 PO), Take  by mouth Daily., Disp: , Rfl:     Allergies:  Allergies   Allergen Reactions   • Erythromycin Unknown - Low Severity     unknown   • Penicillins        Social History:  Social History     Socioeconomic History   • Marital status:    Tobacco Use   • Smoking status: Former Smoker     Packs/day: 0.25     Years: 5.00     Pack years: 1.25     Types: Cigarettes     Quit date:      Years since quittin.7   • Smokeless tobacco: Never Used   Vaping Use   • Vaping Use: Never used   Substance and Sexual Activity   • Alcohol use: No   • Drug use: Never   • Sexual activity: Defer       Family History:  Family History   Problem Relation Age of Onset   • Stroke Father          PVR:  103ml     Objective     Physical Exam:   Vital Signs:   Vitals:    22 1404   Weight: 61.2 kg (135 lb)   Height: 157.5 cm (62.01\")   PainSc: 0-No pain     Body mass index is 24.69 kg/m².     Physical Exam  Vitals and nursing note reviewed.   Constitutional:       Appearance: Normal appearance.   HENT:      Head: Normocephalic and atraumatic.   Cardiovascular:      Comments: Well perfused  Pulmonary:      Effort: Pulmonary effort is normal.   Abdominal:      General: Abdomen is flat.      Palpations: Abdomen is soft.   Musculoskeletal:         General: Normal range of motion.   Skin:     General: Skin is warm and dry.   Neurological:      General: No focal deficit present.      Mental Status: She is alert. Mental status is at baseline.   Psychiatric:         Mood and Affect: Mood normal.         Behavior: Behavior normal.         Thought Content: Thought content normal.         Labs:   Brief Urine Lab Results  (Last result in the past 365 days)      Color   Clarity   Blood   Leuk Est   Nitrite   Protein   CREAT   Urine HCG        22 0942 Dark Yellow   Cloudy   Trace   Trace   Positive   1+                 Urine Culture    Urine Culture 22 "   Urine Culture >100,000 CFU/mL Escherichia coli (A) >100,000 CFU/mL Escherichia coli (A)   (A) Abnormal value               Lab Results   Component Value Date    GLUCOSE 80 08/18/2022    CALCIUM 11.0 (H) 08/18/2022     08/18/2022    K 4.1 08/18/2022    CO2 25.5 08/18/2022     08/18/2022    BUN 14 08/18/2022    CREATININE 1.09 (H) 08/18/2022    EGFRIFNONA 32 (L) 05/07/2021    BCR 12.8 08/18/2022    ANIONGAP 10.5 08/18/2022       Lab Results   Component Value Date    WBC 7.61 05/07/2021    HGB 11.2 (L) 05/07/2021    HCT 35.6 05/07/2021    MCV 91.3 05/07/2021     05/07/2021       Images:   No Images in the past 120 days found..    Measures:   Tobacco:   Maya Anand  reports that she quit smoking about 41 years ago. Her smoking use included cigarettes. She has a 1.25 pack-year smoking history. She has never used smokeless tobacco.. I have educated her on the risk of diseases from using tobacco products.           Urine Incontinence: ( NOUI)  Patient seen and evaluated for urinary incontinence, urge predominant urinary incontinence    Assessment / Plan      Assessment:  Mrs. Anand is a 82 y.o. female who presents today with lower urinary tract symptoms including urinary frequency, urgency and urge associated incontinence.  She has a significant past medical history, is accompanied today by her  as well as her daughter due to her history of cognitive decline/Alzheimer's dementia.  She reports significant bother associated with urinary urgency and urge associated incontinence.  She has been initiated on Myrbetriq 50 mg therapy by her primary care physician without significant improvement.  She denies significant irritative fluid intake.  Denies history of constipation, hematuria or past urologic procedure or intervention.    Diagnoses and all orders for this visit:    1. Frequent UTI    2. Urinary incontinence, unspecified type        Patient Education:     Today we discussed in depth the  etiology and management of pelvic floor dysfunction.  We discussed the pelvic floor dysfunction is a common condition in which the patient is unable to relax or coordinate the muscles of the pelvic floor with urination, bowel movement or intercourse.  We discussed that this is a common condition seen both men and women.  We discussed the role of the pelvic musculature and that it supports the bladder, vagina, rectum.  We discussed the intricate and dynamic interplay between the pelvic floor and these associated organs.  Discussed that the pelvic floor musculature added support and stability for these organ systems but can also result in symptoms including muscle spasms, pain with urination, bowel movement or sexual intercourse.  We discussed that the causes of pelvic floor dysfunction are often multifactorial.   We discussed that it can often take a prolonged period of multimodal therapy to improve symptoms.  We discussed conservative treatment strategies including biofeedback, pelvic floor physical therapy, medications, relaxing techniques.  We discussed that improving bowel habits and constipation is important.      We also discussed in depth the etiology and management of urinary tract infection and recurrent urinary tract infection.  We discussed the important principal that the definition of a diagnosed UTI requires a urine culture.  We discussed that a presumptive diagnosis of UTI cannot be made with just signs symptoms or urinalysis.  We discussed that urinalysis is not sufficient to diagnose a UTI.  We discussed the difference between unresolved versus recurrent urinary tract infection.  We discussed that recurrent UTIs typically defined as greater than 2 UTIs within 6 months or greater than 3 within 1 year.  Discussed that the evaluation for recurrent UTI should include a history physical exam and a urine culture.  We discussed the recurrent infections can be a single complicated UTI versus a reinfection.   We discussed the indication for evaluation of the urinary tract when recurrent infections are complicated, occur frequently, or and symptoms persist long after an infection is eradicated.  We discussed therapies to prevent recurrence of infections.  Discussed conservative therapies including increased hydration appropriate , urinary hygiene, decreasing constipation, improvement in pelvic floor related symptoms, decreasing irritative food/fluid intake.  We discussed topical vaginal estrogen and the role this plays in decreasing the rate of infection.  We discussed that topical vaginal estrogen decreases vaginal pH, normalizes the vaginal laura, and decreases contamination.   We discussed mechanisms to prevent infection including oral cranberry supplement, d-mannose, methenamine.   We discussed that methenamine therapy is not a treatment for an active infection and must be discontinued during active infection.  We discussed the side effects.  We discussed the role for laboratory testing when on this therapy.   We discussed that we will unlikely be able to eradicate all infection but our goal is to decrease the number she has per year.  Patient is understanding and agreeable with the plan of care above to decrease the rate of infections.    Today we discussed the etiology and management of LUTS/OAB. We discussed work-up including history and physical exam as well as urinalysis.  We discussed PVR.  We discussed evaluation and management of urinary urgency and overactivity of the bladder.  We discussed conservative management and behavioral strategies including decreasing irritative p.o. fluid intake, timed voiding, double voiding.  We discussed the role that constipation can play in lower urinary tract symptoms and improving bowel hygiene.  We discussed management of overactive bladder including conservative p.o. medication options.  We discussed anticholinergic medication class and the risks and benefits of this  medication including side effects including dry mouth, dry, constipation and others.  We discussed that there are additional medications including beta-3 agonist, and associated risks and benefits including hypertension and requirement to monitor blood pressure after starting.  We also discussed  third line potential procedural interventions including intradetrusor botox injections and sacral nerve neuromodulation. We discussed risks and benefits of these procedures.  I discussed the role of a bladder diary and how this will help identify her most concerning urinary symptoms.    Today we have discussed conservative strategies to improve urinary symptoms.  Discussed continuation of p.o. medical therapy at this time.  I would like her to present back with bladder diary, will require 8 of  and daughter to complete.  We have discussed third line options for symptoms today.  We have discussed that this will require significant consideration by family due to her additional medical comorbidities, cognitive decline.  The patient does report significant bother, we could consider third line therapy if  patient has bothered enough and family desires further intervention.  We will have her perform bladder diary prior to any evaluation and consider/discuss review educational material provided today.           Follow Up:   Return in about 8 weeks (around 11/15/2022) for Recheck.    I spent approximately 60 minutes providing clinical care for this patient; including review of patient's chart and provider documentation, face to face time spent with patient in examination room (obtaining history, performing physical exam, discussing diagnosis and management options), placing orders, and completing patient documentation.     Karl Stone MD  [facility]

## 2022-09-26 PROCEDURE — 93248 EXT ECG>7D<15D REV&INTERPJ: CPT | Performed by: INTERNAL MEDICINE

## 2022-10-20 DIAGNOSIS — E03.9 ACQUIRED HYPOTHYROIDISM: ICD-10-CM

## 2022-10-20 RX ORDER — LEVOTHYROXINE SODIUM 0.07 MG/1
TABLET ORAL
Qty: 90 TABLET | Refills: 3 | Status: SHIPPED | OUTPATIENT
Start: 2022-10-20

## 2022-10-20 NOTE — TELEPHONE ENCOUNTER
Rx Refill Note  Requested Prescriptions     Pending Prescriptions Disp Refills   • levothyroxine (SYNTHROID, LEVOTHROID) 75 MCG tablet [Pharmacy Med Name: L-THYROXINE (SYNTHROID) TABS 75MCG] 90 tablet 3     Sig: TAKE 1 TABLET EVERY MORNING      Last office visit with prescribing clinician: 8/18/2022      Next office visit with prescribing clinician: 12/8/2022            Torie Coulter MA  10/20/22, 09:12 EDT

## 2022-11-02 ENCOUNTER — OFFICE VISIT (OUTPATIENT)
Dept: CARDIOLOGY | Facility: CLINIC | Age: 82
End: 2022-11-02

## 2022-11-02 VITALS
HEART RATE: 57 BPM | BODY MASS INDEX: 24.88 KG/M2 | HEIGHT: 62 IN | WEIGHT: 135.2 LBS | DIASTOLIC BLOOD PRESSURE: 68 MMHG | SYSTOLIC BLOOD PRESSURE: 136 MMHG | OXYGEN SATURATION: 99 %

## 2022-11-02 DIAGNOSIS — I49.1 PREMATURE ATRIAL COMPLEXES: Primary | ICD-10-CM

## 2022-11-02 DIAGNOSIS — I10 ESSENTIAL HYPERTENSION: Chronic | ICD-10-CM

## 2022-11-02 PROCEDURE — 93000 ELECTROCARDIOGRAM COMPLETE: CPT | Performed by: INTERNAL MEDICINE

## 2022-11-02 PROCEDURE — 99214 OFFICE O/P EST MOD 30 MIN: CPT | Performed by: INTERNAL MEDICINE

## 2022-11-02 RX ORDER — AMLODIPINE BESYLATE 2.5 MG/1
2.5 TABLET ORAL DAILY
Qty: 90 TABLET | Refills: 3 | Status: SHIPPED | OUTPATIENT
Start: 2022-11-02

## 2022-11-02 NOTE — PROGRESS NOTES
[facility] Cardiology  Consultation H&P  Maya Anand  1940  [phone]  There is no work phone number on file..    VISIT DATE:  11/02/2022    PCP: Ysabel Abdalla MD  [address] [facility] [address]    CC:  Chief Complaint   Patient presents with   • PAC (premature atrial contraction)       Previous cardiac studies and procedures:  August 2022  TTE  • Left ventricular ejection fraction appears to be 56 - 60%. Left ventricular systolic function is normal.  • Mild tricuspid valve regurgitation is present.  14-day monitor: Frequent PACs, 19%.    ASSESSMENT:   Diagnosis Plan   1. Premature atrial complexes        2. Essential hypertension              PLAN:  Premature atrial contractions, frequent: No significant underlying structural or functional heart disease.  Continue low-dose Toprol.  We will continue to trend for the development of more sustained atrial arrhythmias such as atrial fibrillation.    Hypertension: Concerned that with the addition of low-dose beta-blockade she may be having intermittent relative hypotension placing her at increased risk for falls.  Decreasing amlodipine to 2.5 mg p.o. daily.  Patient was instructed to help her keep a home blood pressure log.    History of Present Illness   82-year-old female with history of hypertension, frequent premature atrial contractions, and developing dementia.  Previously noted to have an irregular heartbeat during routine follow-up.  Patient denies palpitations, chest pain or dyspnea.  Per family she has fairly limited mobility.  Developing cognitive decline.  Follow-up ambulatory ECG monitor revealed frequent PACs but no atrial fibrillation or flutter.  Transthoracic echo revealed essentially normal myocardial structure and function.  Family ports that she has had a couple falls getting in and out of bed.  Slightly unstable on her feet.  Family helps her to be compliant with medical therapy.    PHYSICAL  "EXAMINATION:  Vitals:    11/02/22 1009   BP: 136/68   BP Location: Left arm   Patient Position: Sitting   Pulse: 57   SpO2: 99%   Weight: 61.3 kg (135 lb 3.2 oz)   Height: 157.5 cm (62\")     General Appearance:    Alert, cooperative, no distress, appears stated age   Head:    Normocephalic, without obvious abnormality, atraumatic   Eyes:    conjunctiva/corneas clear, EOM's intact, fundi     benign, both eyes   Ears:    Normal TM's and external ear canals, both ears   Nose:   Nares normal, septum midline, mucosa normal, no drainage    or sinus tenderness   Throat:   Lips, mucosa, and tongue normal; teeth and gums normal   Neck:   Supple, symmetrical, trachea midline, no adenopathy;     thyroid:  no enlargement/tenderness/nodules; no carotid    bruit or JVD   Back:     Symmetric, no curvature, ROM normal, no CVA tenderness   Lungs:     Clear to auscultation bilaterally, respirations unlabored   Chest Wall:    No tenderness or deformity    Heart:    Regular rate and rhythm, S1 and S2 normal, no murmur, rub   or gallop, normal carotid impulse bilaterally without bruit.   Abdomen:     Soft, non-tender, bowel sounds active all four quadrants,     no masses, no organomegaly   Extremities:   Extremities normal, atraumatic, no cyanosis or edema   Pulses:   2+ and symmetric all extremities   Skin:   Skin color, texture, turgor normal, no rashes or lesions   Lymph nodes:   Cervical, supraclavicular, and axillary nodes normal   Neurologic:   normal strength, sensation intact     throughout       Diagnostic Data:    ECG 12 Lead    Date/Time: 11/2/2022 10:27 AM  Performed by: Chemo Lemon III, MD  Authorized by: Chemo Lemon III, MD   Comparison: compared with previous ECG from 8/18/2022  Comparison to previous ECG: PACs are present  Rhythm: sinus bradycardia    Clinical impression: normal ECG          Lab Results   Component Value Date    CHLPL 199 06/02/2015    TRIG 110 06/02/2015    HDL 63 (H) 06/02/2015     Lab Results "   Component Value Date    GLUCOSE 80 08/18/2022    BUN 14 08/18/2022    CREATININE 1.09 (H) 08/18/2022     08/18/2022    K 4.1 08/18/2022     08/18/2022    CO2 25.5 08/18/2022     No results found for: HGBA1C  Lab Results   Component Value Date    WBC 7.61 05/07/2021    HGB 11.2 (L) 05/07/2021    HCT 35.6 05/07/2021     05/07/2021       PROBLEM LIST:  Patient Active Problem List   Diagnosis   • Acquired hypothyroidism   • Osteoarthritis of knee   • Urge incontinence of urine   • Essential hypertension   • Glaucoma suspect of both eyes   • Intermediate stage nonexudative age-related macular degeneration of both eyes   • PCO (posterior capsular opacification), left   • Primary open angle glaucoma of both eyes, mild stage   • 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   • Dry eyes, bilateral   • Bilateral presbyopia   • Bilateral myopia   • Astigmatism of both eyes   • Herpes labialis   • Hyperparathyroidism (HCC)   • JOSE (generalized anxiety disorder)   • Memory loss   • Major depression in remission (HCC)   • Premature atrial complexes       PAST MEDICAL HX  Past Medical History:   Diagnosis Date   • Acid reflux    • Anxiety    • Chronic cerebral ischemia    • Depression    • Frequent UTI    • JOSE (generalized anxiety disorder)    • Herpes simplex    • HTN (hypertension)    • Hypothyroidism    • Memory loss    • Ovarian cyst    • Peripheral edema        Allergies  Allergies   Allergen Reactions   • Erythromycin Unknown - Low Severity     unknown   • Penicillins        Current Medications    Current Outpatient Medications:   •  Apoaequorin (Prevagen) 10 MG capsule, Take 20 mg by mouth Daily., Disp: , Rfl:   •  cyanocobalamin (VITAMIN B-12) 500 MCG tablet, Take 1,000 mcg by mouth Daily., Disp: , Rfl:   •  acyclovir (ZOVIRAX) 400 MG tablet, Take 1 tablet by mouth 3 (Three) Times a Day. (Patient taking differently: Take 1 tablet by mouth As Needed.), Disp: 270 tablet, Rfl: 3  •   amLODIPine (NORVASC) 2.5 MG tablet, Take 1 tablet by mouth Daily., Disp: 90 tablet, Rfl: 3  •  AZO-CRANBERRY PO, Take  by mouth Daily., Disp: , Rfl:   •  D-Mannose 500 MG capsule, Take  by mouth 3 (Three) Times a Day., Disp: , Rfl:   •  donepezil (Aricept) 10 MG tablet, Take 1 tablet by mouth Daily., Disp: 90 tablet, Rfl: 3  •  escitalopram (LEXAPRO) 10 MG tablet, Take 1 tablet by mouth Every Morning., Disp: 90 tablet, Rfl: 0  •  Lactobacillus-Inulin (PROBIOTIC DIGESTIVE SUPPORT PO), Take  by mouth Daily., Disp: , Rfl:   •  levothyroxine (SYNTHROID, LEVOTHROID) 75 MCG tablet, TAKE 1 TABLET EVERY MORNING, Disp: 90 tablet, Rfl: 3  •  memantine (NAMENDA) 10 MG tablet, Take 1 tablet by mouth 2 (Two) Times a Day., Disp: 30 tablet, Rfl: 11  •  metoprolol succinate XL (TOPROL-XL) 25 MG 24 hr tablet, Take 0.5 tablets by mouth Every Night., Disp: 90 tablet, Rfl: 0  •  Mirabegron ER (Myrbetriq) 50 MG tablet sustained-release 24 hour 24 hr tablet, Take 50 mg by mouth Daily., Disp: 90 tablet, Rfl: 3  •  Multiple Vitamins-Minerals (PRESERVISION AREDS 2 PO), Take  by mouth Daily., Disp: , Rfl:          ROS  ROS      SOCIAL HX  Social History     Socioeconomic History   • Marital status:    Tobacco Use   • Smoking status: Former     Packs/day: 0.25     Years: 5.00     Pack years: 1.25     Types: Cigarettes     Quit date:      Years since quittin.8   • Smokeless tobacco: Never   Vaping Use   • Vaping Use: Never used   Substance and Sexual Activity   • Alcohol use: No   • Drug use: Never   • Sexual activity: Defer       FAMILY HX  Family History   Problem Relation Age of Onset   • Stroke Father              Chemo Lemon III, MD, [facility]

## 2022-11-15 DIAGNOSIS — I49.1 PAC (PREMATURE ATRIAL CONTRACTION): ICD-10-CM

## 2022-11-15 DIAGNOSIS — I10 ESSENTIAL HYPERTENSION: ICD-10-CM

## 2022-11-15 RX ORDER — METOPROLOL SUCCINATE 25 MG/1
12.5 TABLET, EXTENDED RELEASE ORAL NIGHTLY
Qty: 45 TABLET | Refills: 1 | Status: SHIPPED | OUTPATIENT
Start: 2022-11-15 | End: 2023-02-20 | Stop reason: SDUPTHER

## 2022-12-08 ENCOUNTER — OFFICE VISIT (OUTPATIENT)
Dept: FAMILY MEDICINE CLINIC | Facility: CLINIC | Age: 82
End: 2022-12-08

## 2022-12-08 ENCOUNTER — LAB (OUTPATIENT)
Dept: LAB | Facility: HOSPITAL | Age: 82
End: 2022-12-08

## 2022-12-08 VITALS
HEIGHT: 62 IN | BODY MASS INDEX: 24.92 KG/M2 | SYSTOLIC BLOOD PRESSURE: 124 MMHG | WEIGHT: 135.4 LBS | DIASTOLIC BLOOD PRESSURE: 64 MMHG | HEART RATE: 67 BPM | OXYGEN SATURATION: 95 %

## 2022-12-08 DIAGNOSIS — F41.1 GAD (GENERALIZED ANXIETY DISORDER): ICD-10-CM

## 2022-12-08 DIAGNOSIS — E03.9 ACQUIRED HYPOTHYROIDISM: ICD-10-CM

## 2022-12-08 DIAGNOSIS — E21.3 HYPERPARATHYROIDISM: ICD-10-CM

## 2022-12-08 DIAGNOSIS — H35.3132 INTERMEDIATE STAGE NONEXUDATIVE AGE-RELATED MACULAR DEGENERATION OF BOTH EYES: ICD-10-CM

## 2022-12-08 DIAGNOSIS — Z13.0 SCREENING FOR DEFICIENCY ANEMIA: ICD-10-CM

## 2022-12-08 DIAGNOSIS — B00.1 HERPES LABIALIS: ICD-10-CM

## 2022-12-08 DIAGNOSIS — R41.3 MEMORY LOSS: ICD-10-CM

## 2022-12-08 DIAGNOSIS — N39.41 URGE INCONTINENCE OF URINE: ICD-10-CM

## 2022-12-08 DIAGNOSIS — Z91.81 AT HIGH RISK FOR FALLS: ICD-10-CM

## 2022-12-08 DIAGNOSIS — Z23 IMMUNIZATION DUE: ICD-10-CM

## 2022-12-08 DIAGNOSIS — Z00.00 MEDICARE ANNUAL WELLNESS VISIT, SUBSEQUENT: Primary | ICD-10-CM

## 2022-12-08 DIAGNOSIS — I10 ESSENTIAL HYPERTENSION: Chronic | ICD-10-CM

## 2022-12-08 DIAGNOSIS — F32.5 MAJOR DEPRESSION IN REMISSION: ICD-10-CM

## 2022-12-08 DIAGNOSIS — H40.1131 PRIMARY OPEN ANGLE GLAUCOMA OF BOTH EYES, MILD STAGE: ICD-10-CM

## 2022-12-08 LAB
ALBUMIN SERPL-MCNC: 4 G/DL (ref 3.5–5.2)
ALBUMIN/GLOB SERPL: 1.5 G/DL
ALP SERPL-CCNC: 83 U/L (ref 39–117)
ALT SERPL W P-5'-P-CCNC: 9 U/L (ref 1–33)
ANION GAP SERPL CALCULATED.3IONS-SCNC: 9.7 MMOL/L (ref 5–15)
AST SERPL-CCNC: 21 U/L (ref 1–32)
BILIRUB SERPL-MCNC: 0.3 MG/DL (ref 0–1.2)
BUN SERPL-MCNC: 18 MG/DL (ref 8–23)
BUN/CREAT SERPL: 15.8 (ref 7–25)
CALCIUM SPEC-SCNC: 11 MG/DL (ref 8.6–10.5)
CALCIUM SPEC-SCNC: 11.1 MG/DL (ref 8.6–10.5)
CHLORIDE SERPL-SCNC: 108 MMOL/L (ref 98–107)
CO2 SERPL-SCNC: 27.3 MMOL/L (ref 22–29)
CREAT SERPL-MCNC: 1.14 MG/DL (ref 0.57–1)
DEPRECATED RDW RBC AUTO: 41.7 FL (ref 37–54)
EGFRCR SERPLBLD CKD-EPI 2021: 48.2 ML/MIN/1.73
ERYTHROCYTE [DISTWIDTH] IN BLOOD BY AUTOMATED COUNT: 13.2 % (ref 12.3–15.4)
GLOBULIN UR ELPH-MCNC: 2.6 GM/DL
GLUCOSE SERPL-MCNC: 91 MG/DL (ref 65–99)
HCT VFR BLD AUTO: 35.5 % (ref 34–46.6)
HGB BLD-MCNC: 11.6 G/DL (ref 12–15.9)
MCH RBC QN AUTO: 28.5 PG (ref 26.6–33)
MCHC RBC AUTO-ENTMCNC: 32.7 G/DL (ref 31.5–35.7)
MCV RBC AUTO: 87.2 FL (ref 79–97)
PLATELET # BLD AUTO: 238 10*3/MM3 (ref 140–450)
PMV BLD AUTO: 11.7 FL (ref 6–12)
POTASSIUM SERPL-SCNC: 4.5 MMOL/L (ref 3.5–5.2)
PROT SERPL-MCNC: 6.6 G/DL (ref 6–8.5)
PTH-INTACT SERPL-MCNC: 147 PG/ML (ref 15–65)
RBC # BLD AUTO: 4.07 10*6/MM3 (ref 3.77–5.28)
SODIUM SERPL-SCNC: 145 MMOL/L (ref 136–145)
TSH SERPL DL<=0.05 MIU/L-ACNC: 3.43 UIU/ML (ref 0.27–4.2)
WBC NRBC COR # BLD: 7.63 10*3/MM3 (ref 3.4–10.8)

## 2022-12-08 PROCEDURE — 1160F RVW MEDS BY RX/DR IN RCRD: CPT | Performed by: FAMILY MEDICINE

## 2022-12-08 PROCEDURE — 1170F FXNL STATUS ASSESSED: CPT | Performed by: FAMILY MEDICINE

## 2022-12-08 PROCEDURE — 85027 COMPLETE CBC AUTOMATED: CPT

## 2022-12-08 PROCEDURE — 90677 PCV20 VACCINE IM: CPT | Performed by: FAMILY MEDICINE

## 2022-12-08 PROCEDURE — 83970 ASSAY OF PARATHORMONE: CPT

## 2022-12-08 PROCEDURE — G0439 PPPS, SUBSEQ VISIT: HCPCS | Performed by: FAMILY MEDICINE

## 2022-12-08 PROCEDURE — 1159F MED LIST DOCD IN RCRD: CPT | Performed by: FAMILY MEDICINE

## 2022-12-08 PROCEDURE — G0009 ADMIN PNEUMOCOCCAL VACCINE: HCPCS | Performed by: FAMILY MEDICINE

## 2022-12-08 PROCEDURE — 99397 PER PM REEVAL EST PAT 65+ YR: CPT | Performed by: FAMILY MEDICINE

## 2022-12-08 PROCEDURE — 80053 COMPREHEN METABOLIC PANEL: CPT

## 2022-12-08 PROCEDURE — 84443 ASSAY THYROID STIM HORMONE: CPT

## 2022-12-08 PROCEDURE — 1126F AMNT PAIN NOTED NONE PRSNT: CPT | Performed by: FAMILY MEDICINE

## 2022-12-08 RX ORDER — ESCITALOPRAM OXALATE 10 MG/1
10 TABLET ORAL EVERY MORNING
Qty: 90 TABLET | Refills: 3 | Status: SHIPPED | OUTPATIENT
Start: 2022-12-08

## 2022-12-08 RX ORDER — VALACYCLOVIR HYDROCHLORIDE 1 G/1
2000 TABLET, FILM COATED ORAL 2 TIMES DAILY
Qty: 8 TABLET | Refills: 11 | Status: SHIPPED | OUTPATIENT
Start: 2022-12-08

## 2022-12-08 NOTE — PROGRESS NOTES
The ABCs of the Annual Wellness Visit  Subsequent Medicare Wellness Visit    Chief Complaint   Patient presents with   • Medicare Wellness-subsequent     Ophthalmologist referral   • Annual Exam      Subjective    History of Present Illness:  Maya Annad is a 82 y.o. female who presents for a Subsequent Medicare Wellness Visit.    Macular degeneration seen at  for eye exams but provider left. Would like a second opinion.     Cardiology recommended reduced dose of BP medication with falls.    Urology follow-up in the fall but chose not to proceed with procedures because of problems waking up from anesthesia in the past.  Myrbetriq helps some with incontinence.           The following portions of the patient's history were reviewed and   updated as appropriate: allergies, current medications, past family history, past medical history, past social history, past surgical history and problem list.    Compared to one year ago, the patient feels her physical   health is better.    Compared to one year ago, the patient feels her mental   health is the same.    Recent Hospitalizations:  She was not admitted to the hospital during the last year.       Current Medical Providers:  Patient Care Team:  Ysabel Abdalla MD as PCP - General (Family Medicine)  Jabari Gallegos MD as Consulting Physician (Nephrology)  Chemo Lemon III, MD as Cardiologist (Cardiology)  Randolph Collado APRN as Nurse Practitioner (Cardiology)    Outpatient Medications Prior to Visit   Medication Sig Dispense Refill   • amLODIPine (NORVASC) 2.5 MG tablet Take 1 tablet by mouth Daily. 90 tablet 3   • Apoaequorin (Prevagen) 10 MG capsule Take 20 mg by mouth Daily.     • AZO-CRANBERRY PO Take  by mouth Daily.     • cyanocobalamin (VITAMIN B-12) 500 MCG tablet Take 1,000 mcg by mouth Daily.     • D-Mannose 500 MG capsule Take  by mouth 3 (Three) Times a Day.     • donepezil (Aricept) 10 MG tablet Take 1 tablet by mouth Daily. 90 tablet  3   • Lactobacillus-Inulin (PROBIOTIC DIGESTIVE SUPPORT PO) Take  by mouth Daily.     • levothyroxine (SYNTHROID, LEVOTHROID) 75 MCG tablet TAKE 1 TABLET EVERY MORNING 90 tablet 3   • memantine (NAMENDA) 10 MG tablet Take 1 tablet by mouth 2 (Two) Times a Day. 30 tablet 11   • metoprolol succinate XL (TOPROL-XL) 25 MG 24 hr tablet Take 0.5 tablets by mouth Every Night. 45 tablet 1   • Multiple Vitamins-Minerals (PRESERVISION AREDS 2 PO) Take  by mouth Daily.     • acyclovir (ZOVIRAX) 400 MG tablet Take 1 tablet by mouth 3 (Three) Times a Day. (Patient taking differently: Take 400 mg by mouth As Needed.) 270 tablet 3   • escitalopram (LEXAPRO) 10 MG tablet Take 1 tablet by mouth Every Morning. 90 tablet 0   • Mirabegron ER (Myrbetriq) 50 MG tablet sustained-release 24 hour 24 hr tablet Take 50 mg by mouth Daily. 90 tablet 3     No facility-administered medications prior to visit.       No opioid medication identified on active medication list. I have reviewed chart for other potential  high risk medication/s and harmful drug interactions in the elderly.          Aspirin is not on active medication list.  Aspirin use is not indicated based on review of current medical condition/s. Risk of harm outweighs potential benefits.  .    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  ACP discussion was held with the patient during this visit. Patient has an advance directive (not in EMR), copy requested.          Objective    Vitals:    12/08/22 0859   BP: 124/64   Pulse: 67   SpO2: 95%   Weight: 61.4 kg (135 lb 6.4 oz)   Height: 157.5 cm (62.01\")   PainSc: 0-No pain     Estimated body mass index is 24.76 kg/m² as calculated from the following:    Height as of this encounter: 157.5 cm (62.01\").    Weight as of this encounter: 61.4 kg (135 lb 6.4 oz).    BMI is within normal parameters. No other follow-up for BMI required.      Does the patient have evidence of cognitive impairment? Yes    Physical Exam  Constitutional:       General: She is not in acute distress.  HENT:      Right Ear: Tympanic membrane and ear canal normal.      Left Ear: Tympanic membrane and ear canal normal.      Ears:      Comments: Hearing loss, hearing aids  Eyes:      General:         Right eye: No discharge.         Left eye: No discharge.      Conjunctiva/sclera: Conjunctivae normal.      Comments: Wears glasses   Neck:      Thyroid: No thyromegaly.   Cardiovascular:      Rate and Rhythm: Normal rate and regular rhythm.   Pulmonary:      Effort: Pulmonary effort is normal.      Breath sounds: Normal breath sounds.   Abdominal:      Palpations: Abdomen is soft. There is no hepatomegaly.      Tenderness: There is no abdominal tenderness.   Musculoskeletal:      Cervical back: Neck supple.      Right lower leg: No edema.      Left lower leg: No edema.   Lymphadenopathy:      Head:      Right side of head: No submandibular, preauricular or posterior auricular adenopathy.      Left side of head: No submandibular, preauricular or posterior auricular adenopathy.      Cervical: No cervical adenopathy.   Skin:     General: Skin is warm.   Neurological:      Mental Status: She is alert and oriented to person, place, and time.      Gait: Gait abnormal ( slow, " shuffling right foot).   Psychiatric:         Mood and Affect: Mood normal.         Behavior: Behavior normal.         Thought Content: Thought content normal.         Judgment: Judgment normal.                 HEALTH RISK ASSESSMENT    Smoking Status:  Social History     Tobacco Use   Smoking Status Former   • Packs/day: 0.25   • Years: 5.00   • Pack years: 1.25   • Types: Cigarettes   • Quit date:    • Years since quittin.9   Smokeless Tobacco Never     Alcohol Consumption:  Social History     Substance and Sexual Activity   Alcohol Use No     Fall Risk Screen:    [facility] Fall Risk Assessment was completed, and patient is at HIGH risk for falls. Assessment completed on:2022   [facility] Key Questions   Have you fallen in the past year?: Yes    Stay Idependant Patient Questions   Patient Fall Risk Assessment Score : 0        Depression Screening:  PHQ-2/PHQ-9 Depression Screening 2022   Retired PHQ-9 Total Score -   Retired Total Score -   Little Interest or Pleasure in Doing Things 0-->not at all   Feeling Down, Depressed or Hopeless 0-->not at all   PHQ-9: Brief Depression Severity Measure Score 0       Health Habits and Functional and Cognitive Screening:  Functional & Cognitive Status 2022   Do you have difficulty preparing food and eating? Yes   Do you have difficulty bathing yourself, getting dressed or grooming yourself? Yes   Do you have difficulty using the toilet? No   Do you have difficulty moving around from place to place? Yes   Do you have trouble with steps or getting out of a bed or a chair? Yes   Current Diet Unhealthy Diet   Dental Exam Up to date   Eye Exam Up to date   Exercise (times per week) 0 times per week   Current Exercises Include No Regular Exercise   Current Exercise Activities Include -   Do you need help using the phone?  Yes   Are you deaf or do you have serious difficulty hearing?  Yes   Do you need help with transportation? Yes   Do you need  help shopping? Yes   Do you need help preparing meals?  Yes   Do you need help with housework?  Yes   Do you need help with laundry? Yes   Do you need help taking your medications? Yes   Do you need help managing money? Yes   Do you ever drive or ride in a car without wearing a seat belt? No   Have you felt unusual stress, anger or loneliness in the last month? No   Who do you live with? Spouse   If you need help, do you have trouble finding someone available to you? No   Have you been bothered in the last four weeks by sexual problems? No   Do you have difficulty concentrating, remembering or making decisions? Yes       Age-appropriate Screening Schedule:  Refer to the list below for future screening recommendations based on patient's age, sex and/or medical conditions. Orders for these recommended tests are listed in the plan section. The patient has been provided with a written plan.    Health Maintenance   Topic Date Due   • ZOSTER VACCINE (1 of 2) Never done   • DXA SCAN  12/01/2023 (Originally 1940)   • MAMMOGRAM  10/24/2024   • TDAP/TD VACCINES (2 - Td or Tdap) 09/15/2027   • INFLUENZA VACCINE  Completed              Immunization History   Administered Date(s) Administered   • COVID-19 (MODERNA) BIVALENT BOOSTER 6+YRS 09/13/2022   • COVID-19 (PFIZER) PURPLE CAP 01/21/2021, 02/10/2021, 09/28/2021   • Fluad Quad 65+ 09/13/2022   • Fluzone High-Dose 65+yrs 05/30/2020, 06/30/2021, 09/23/2021   • Pneumococcal Conjugate 20-Valent (PCV20) 12/08/2022   • Tdap 09/15/2017       Assessment & Plan   CMS Preventative Services Quick Reference  Risk Factors Identified During Encounter  Depression/Dysphoria: Current medication is effective, no change recommended  Fall Risk-High or Moderate: Discussed Fall Prevention in the home, Information on Fall Prevention Shared in After Visit Summary and Sit to Stand Exercise Information Shared in After Visit Summary  Hearing Problem: continue hearing aids  Immunizations  Discussed/Encouraged: Prevnar 20 (Pneumococcal 20-valent conjugate)  The above risks/problems have been discussed with the patient.  Follow up actions/plans if indicated are seen below in the Assessment/Plan Section.  Pertinent information has been shared with the patient in the After Visit Summary.    Diagnoses and all orders for this visit:    1. Medicare annual wellness visit, subsequent (Primary)  Counseling/anticipatory guidance: Nutrition handout, mental health, eye exam, immunizations, screenings  Unsure of last colonoscopy about her age would not recommend proceeding with any further colon cancer screening.  Discussed osteoporosis screening and family declined DEXA scan.    2. At high risk for falls  Observed abnormal gait in the office.  Discussed exercises to help with reducing falls.  3. Immunization due  -     Pneumococcal Conjugate Vaccine 20-Valent All    4. Urge incontinence of urine  -     Mirabegron ER (Myrbetriq) 50 MG tablet sustained-release 24 hour 24 hr tablet; Take 50 mg by mouth Daily.  Dispense: 90 tablet; Refill: 3  Continue Myrbetriq.  5. JOSE (generalized anxiety disorder)  -     escitalopram (LEXAPRO) 10 MG tablet; Take 1 tablet by mouth Every Morning.  Dispense: 90 tablet; Refill: 3  Continue Lexapro.  6. Major depression in remission (HCC)  -     escitalopram (LEXAPRO) 10 MG tablet; Take 1 tablet by mouth Every Morning.  Dispense: 90 tablet; Refill: 3  Continue Lexapro.  7. Essential hypertension  -     Comprehensive Metabolic Panel; Future  Blood pressure remains under good control with amlodipine 2.5 mg, metoprolol XL 12.5 mg.  I do not think that she needs cholesterol screening with her most recent lipid panel in 2015 normal and at her age would not initiate a statin.  Check up with cardiology in 6 months.  8. Acquired hypothyroidism  -     TSH Rfx On Abnormal To Free T4; Future  Check labs and adjust levothyroxine dose if needed.  9. Herpes labialis  -     valACYclovir (Valtrex)  1000 MG tablet; Take 2 tablets by mouth 2 (Two) Times a Day. For 1 day per episode  Dispense: 8 tablet; Refill: 11  Change from acyclovir to valacyclovir for easier dosing as needed for cold sore outbreaks.  10. Intermediate stage nonexudative age-related macular degeneration of both eyes  -     Ambulatory Referral to Ophthalmology  Request second opinion.  11. Primary open angle glaucoma of both eyes, mild stage  -     Ambulatory Referral to Ophthalmology  Request second opinion.  12. Memory loss  Family reports that her memory has worsened.  She has upcoming follow-up with neurology.  She is currently taking the amantadine and donepezil.  13. Hyperparathyroidism (HCC)  -     PTH, Intact & Calcium; Future  Check labs.  She is not established with endocrinology.  14. Screening for deficiency anemia  -     CBC (No Diff); Future        Follow Up:   Return in about 1 year (around 12/8/2023) for MWV.     An After Visit Summary and PPPS were made available to the patient.                   Electronically signed by Ysabel Abdalla MD, 12/08/22, 9:45 AM EST.

## 2022-12-08 NOTE — PATIENT INSTRUCTIONS
Please bring in copy of living will.         Fall Prevention in the Home, Adult  Falls can cause injuries and affect people of all ages. There are many simple things that you can do to make your home safe and to help prevent falls. Ask for help when making these changes, if needed.  What actions can I take to prevent falls?  General instructions  Use good lighting in all rooms. Replace any light bulbs that burn out, turn on lights if it is dark, and use night-lights.  Place frequently used items in easy-to-reach places. Lower the shelves around your home if necessary.  Set up furniture so that there are clear paths around it. Avoid moving your furniture around.  Remove throw rugs and other tripping hazards from the floor.  Avoid walking on wet floors.  Fix any uneven floor surfaces.  Add color or contrast paint or tape to grab bars and handrails in your home. Place contrasting color strips on the first and last steps of staircases.  When you use a stepladder, make sure that it is completely opened and that the sides and supports are firmly locked. Have someone hold the ladder while you are using it. Do not climb a closed stepladder.  Know where your pets are when moving through your home.  What can I do in the bathroom?     Keep the floor dry. Immediately clean up any water that is on the floor.  Remove soap buildup in the tub or shower regularly.  Use nonskid mats or decals on the floor of the tub or shower.  Attach bath mats securely with double-sided, nonslip rug tape.  If you need to sit down while you are in the shower, use a plastic, nonslip stool.  Install grab bars by the toilet and in the tub and shower. Do not use towel bars as grab bars.  What can I do in the bedroom?  Make sure that a bedside light is easy to reach.  Do not use oversized bedding that reaches the floor.  Have a firm chair that has side arms to use for getting dressed.  What can I do in the kitchen?  Clean up any spills right away.  If you  need to reach for something above you, use a sturdy step stool that has a grab bar.  Keep electrical cables out of the way.  Do not use floor polish or wax that makes floors slippery. If you must use wax, make sure that it is non-skid floor wax.  What can I do with my stairs?  Do not leave any items on the stairs.  Make sure that you have a light switch at the top and the bottom of the stairs. Have them installed if you do not have them.  Make sure that there are handrails on both sides of the stairs. Fix handrails that are broken or loose. Make sure that handrails are as long as the staircases.  Install non-slip stair treads on all stairs in your home.  Avoid having throw rugs at the top or bottom of stairs, or secure the rugs with carpet tape to prevent them from moving.  Choose a carpet design that does not hide the edge of steps on the stairs.  Check any carpeting to make sure that it is firmly attached to the stairs. Fix any carpet that is loose or worn.  What can I do on the outside of my home?  Use bright outdoor lighting.  Regularly repair the edges of walkways and driveways and fix any cracks.  Remove high doorway thresholds.  Trim any shrubbery on the main path into your home.  Regularly check that handrails are securely fastened and in good repair. Both sides of all steps should have handrails.  Install guardrails along the edges of any raised decks or porches.  Clear walkways of debris and clutter, including tools and rocks.  Have leaves, snow, and ice cleared regularly.  Use sand or salt on walkways during winter months.  In the garage, clean up any spills right away, including grease or oil spills.  What other actions can I take?  Wear closed-toe shoes that fit well and support your feet. Wear shoes that have rubber soles or low heels.  Use mobility aids as needed, such as canes, walkers, scooters, and crutches.  Review your medicines with your health care provider. Some medicines can cause dizziness  or changes in blood pressure, which increase your risk of falling.  Talk with your health care provider about other ways that you can decrease your risk of falls. This may include working with a physical therapist or  to improve your strength, balance, and endurance.  Where to find more information  Centers for Disease Control and PreventionHUMA: www.cdc.gov  National [place] on Aging: www.janette.nih.gov  Contact a health care provider if:  You are afraid of falling at home.  You feel weak, drowsy, or dizzy at home.  You fall at home.  Summary  There are many simple things that you can do to make your home safe and to help prevent falls.  Ways to make your home safe include removing tripping hazards and installing grab bars in the bathroom.  Ask for help when making these changes in your home.  This information is not intended to replace advice given to you by your health care provider. Make sure you discuss any questions you have with your health care provider.  Document Revised: 07/21/2021 Document Reviewed: 07/21/2021  [employer] Patient Education © 2022 [employer]      Sit-to-Stand Exercise  The sit-to-stand exercise (also known as the chair stand or chair rise exercise) strengthens your lower body and helps you maintain or improve your mobility and independence. The end goal is to do the sit-to-stand exercise without using your hands. This will be easier as you become stronger. You should always talk with your health care provider before starting any exercise program, especially if you have had recent surgery.  Do the exercise exactly as told by your health care provider and adjust it as directed. It is normal to feel mild stretching, pulling, tightness, or discomfort as you do this exercise, but you should stop right away if you feel sudden pain or your pain gets worse. Do not begin doing this exercise until told by your health care provider.  What the sit-to-stand exercise does  The sit-to-stand  exercise helps to strengthen the muscles in your thighs and the muscles in the center of your body that give you stability (core muscles). This exercise is especially helpful if:  You have had knee or hip surgery.  You have trouble getting up from a chair, out of a car, or off the toilet due to muscle weakness.  How to do the sit-to-stand exercise  Sit toward the front edge of a sturdy chair without armrests. Your knees should be bent and your feet should be flat on the floor and shoulder-width apart and underneath your hips.  Place your hands lightly on each side of the seat. Keep your back and neck as straight as possible, with your chest slightly forward.  Breathe in slowly. Lean forward and slightly shift your weight to the front of your feet.  Breathe out as you slowly stand up. Try not to support any weight with your hands.  Stand and pause for a full breath in and out.  Breathe in as you sit down slowly. Tighten your core and abdominal muscles to control your lowering as much as possible. You should lower yourself back to the chair slowly, not just drop back into the seat.  Breathe out slowly.  Do this exercise 10-15 times. If needed, do it fewer times until you build up strength.  Rest for 1 minute, then do another set of 10-15 repetitions.  To change the difficulty of the sit-to-stand exercise  If the exercise is too difficult, use a chair with sturdy armrests, and push off the armrests to help you come to the standing position. You can also use the armrests to help slowly lower yourself back to sitting. As this gets easier, try to use your arms less. You can also place a firm cushion or pillow on the chair to make the surface higher.  If this exercise is too easy, do not use your arms to help raise or lower yourself. You can also wear a weighted vest, use hand weights, increase your repetitions, or try a lower chair.  General tips  You may feel tired when starting an exercise routine. This is normal.  You  may have muscle soreness that lasts a few days. This is normal. As you get stronger, you may not feel muscle soreness.  Use smooth, steady movements.  Do not  hold your breath during strength exercises. This can cause unsafe changes in your blood pressure.  Breathe in slowly through your nose, and breathe out slowly through your mouth.  Summary  Strengthening your lower body is an important step to help you move safely and independently.  The sit-to-stand exercise helps strengthen the muscles in your thighs and core.  You should always talk with your health care provider before starting any exercise program, especially if you have had recent surgery.  This information is not intended to replace advice given to you by your health care provider. Make sure you discuss any questions you have with your health care provider.  Document Revised: 04/10/2022 Document Reviewed: 04/10/2022  Elsevier Patient Education ©  [employer]    You are due for Shingrix vaccination series ( the newest shingles vaccine).  It is a two shot series spaced 2-6 months apart. Please get this vaccine series started at your earliest convenience at your local pharmacy to help avoid shingles outbreak. It is more effective than the old Zostavax vaccine and is recommended even if you have had the Zostavax vaccine in the past.  Once the Shingrix series is completed, it does not need to be repeated.   For more information, please look at the website below:  [facility] Shingrix Vaccine Information      Medicare Wellness  Personal Prevention Plan of Service     Date of Office Visit:    Encounter Provider:  Ysabel Abdalla MD  Place of Service:  [facility]  Patient Name: Maya Anand  :  1940    As part of the Medicare Wellness portion of your visit today, we are providing you with this personalized preventive plan of services (PPPS). This plan is based upon recommendations of the [place] Preventive Services  Task Force (USPSTF) and the Advisory Committee on Immunization Practices (ACIP).    This lists the preventive care services that should be considered, and provides dates of when you are due. Items listed as completed are up-to-date and do not require any further intervention.    Health Maintenance   Topic Date Due    DXA SCAN  Never done    COLORECTAL CANCER SCREENING  Never done    ZOSTER VACCINE (1 of 2) Never done    Pneumococcal Vaccine 65+ (1 - PCV) Never done    COVID-19 Vaccine (4 - Booster) 11/08/2022    ANNUAL WELLNESS VISIT  12/08/2023    MAMMOGRAM  10/24/2024    TDAP/TD VACCINES (2 - Td or Tdap) 09/15/2027    INFLUENZA VACCINE  Completed       No orders of the defined types were placed in this encounter.      No follow-ups on file.          Fall Prevention in the Home, Adult  Falls can cause injuries and affect people of all ages. There are many simple things that you can do to make your home safe and to help prevent falls. Ask for help when making these changes, if needed.  What actions can I take to prevent falls?  General instructions  Use good lighting in all rooms. Replace any light bulbs that burn out, turn on lights if it is dark, and use night-lights.  Place frequently used items in easy-to-reach places. Lower the shelves around your home if necessary.  Set up furniture so that there are clear paths around it. Avoid moving your furniture around.  Remove throw rugs and other tripping hazards from the floor.  Avoid walking on wet floors.  Fix any uneven floor surfaces.  Add color or contrast paint or tape to grab bars and handrails in your home. Place contrasting color strips on the first and last steps of staircases.  When you use a stepladder, make sure that it is completely opened and that the sides and supports are firmly locked. Have someone hold the ladder while you are using it. Do not climb a closed stepladder.  Know where your pets are when moving through your home.  What can I do in the  bathroom?     Keep the floor dry. Immediately clean up any water that is on the floor.  Remove soap buildup in the tub or shower regularly.  Use nonskid mats or decals on the floor of the tub or shower.  Attach bath mats securely with double-sided, nonslip rug tape.  If you need to sit down while you are in the shower, use a plastic, nonslip stool.  Install grab bars by the toilet and in the tub and shower. Do not use towel bars as grab bars.  What can I do in the bedroom?  Make sure that a bedside light is easy to reach.  Do not use oversized bedding that reaches the floor.  Have a firm chair that has side arms to use for getting dressed.  What can I do in the kitchen?  Clean up any spills right away.  If you need to reach for something above you, use a sturdy step stool that has a grab bar.  Keep electrical cables out of the way.  Do not use floor polish or wax that makes floors slippery. If you must use wax, make sure that it is non-skid floor wax.  What can I do with my stairs?  Do not leave any items on the stairs.  Make sure that you have a light switch at the top and the bottom of the stairs. Have them installed if you do not have them.  Make sure that there are handrails on both sides of the stairs. Fix handrails that are broken or loose. Make sure that handrails are as long as the staircases.  Install non-slip stair treads on all stairs in your home.  Avoid having throw rugs at the top or bottom of stairs, or secure the rugs with carpet tape to prevent them from moving.  Choose a carpet design that does not hide the edge of steps on the stairs.  Check any carpeting to make sure that it is firmly attached to the stairs. Fix any carpet that is loose or worn.  What can I do on the outside of my home?  Use bright outdoor lighting.  Regularly repair the edges of walkways and driveways and fix any cracks.  Remove high doorway thresholds.  Trim any shrubbery on the main path into your home.  Regularly check that  handrails are securely fastened and in good repair. Both sides of all steps should have handrails.  Install guardrails along the edges of any raised decks or porches.  Clear walkways of debris and clutter, including tools and rocks.  Have leaves, snow, and ice cleared regularly.  Use sand or salt on walkways during winter months.  In the garage, clean up any spills right away, including grease or oil spills.  What other actions can I take?  Wear closed-toe shoes that fit well and support your feet. Wear shoes that have rubber soles or low heels.  Use mobility aids as needed, such as canes, walkers, scooters, and crutches.  Review your medicines with your health care provider. Some medicines can cause dizziness or changes in blood pressure, which increase your risk of falling.  Talk with your health care provider about other ways that you can decrease your risk of falls. This may include working with a physical therapist or  to improve your strength, balance, and endurance.  Where to find more information  Centers for Disease Control and PreventionHUMA: www.cdc.gov  National [place] on Aging: www.[place]ette.nih.gov  Contact a health care provider if:  You are afraid of falling at home.  You feel weak, drowsy, or dizzy at home.  You fall at home.  Summary  There are many simple things that you can do to make your home safe and to help prevent falls.  Ways to make your home safe include removing tripping hazards and installing grab bars in the bathroom.  Ask for help when making these changes in your home.  This information is not intended to replace advice given to you by your health care provider. Make sure you discuss any questions you have with your health care provider.  Document Revised: 07/21/2021 Document Reviewed: 07/21/2021  Elsevier Patient Education © 2022 [employer] Inc.

## 2022-12-09 ENCOUNTER — TELEPHONE (OUTPATIENT)
Dept: FAMILY MEDICINE CLINIC | Facility: CLINIC | Age: 82
End: 2022-12-09

## 2022-12-09 DIAGNOSIS — E21.3 HYPERPARATHYROIDISM: Primary | ICD-10-CM

## 2022-12-09 NOTE — TELEPHONE ENCOUNTER
Please call family about abnormal result and referral to endocrinology.      The test results show that your parathyroid hormone is elevated as well as calcium.  I will place a referral for endocrinologist to evaluate.  Thyroid function is normal.  Normal white blood cell count, and normal platelets.  Mild anemia is unchanged.  Normal blood sugar.  Kidney function is moderately decreased but stable compared to prior.  Normal electrolytes.  Normal liver function.

## 2022-12-14 ENCOUNTER — PATIENT MESSAGE (OUTPATIENT)
Dept: FAMILY MEDICINE CLINIC | Facility: CLINIC | Age: 82
End: 2022-12-14

## 2023-02-13 DIAGNOSIS — F41.1 GAD (GENERALIZED ANXIETY DISORDER): ICD-10-CM

## 2023-02-13 DIAGNOSIS — F32.5 MAJOR DEPRESSION IN REMISSION: ICD-10-CM

## 2023-02-13 RX ORDER — ESCITALOPRAM OXALATE 10 MG/1
TABLET ORAL
Qty: 90 TABLET | Refills: 3 | OUTPATIENT
Start: 2023-02-13

## 2023-02-13 NOTE — TELEPHONE ENCOUNTER
Rx Refill Note  Requested Prescriptions     Pending Prescriptions Disp Refills   • escitalopram (LEXAPRO) 10 MG tablet [Pharmacy Med Name: ESCITALOPRAM TABS 10MG] 90 tablet 3     Sig: TAKE 1 TABLET EVERY MORNING      Last office visit with prescribing clinician: 12/8/2022   Last telemedicine visit with prescribing clinician: Visit date not found   Next office visit with prescribing clinician: Visit date not found                         Would you like a call back once the refill request has been completed: [] Yes [] No    If the office needs to give you a call back, can they leave a voicemail: [] Yes [] No    Arely Khalil MA  02/13/23, 08:55 EST

## 2023-02-14 DIAGNOSIS — F41.1 GAD (GENERALIZED ANXIETY DISORDER): ICD-10-CM

## 2023-02-14 DIAGNOSIS — F32.5 MAJOR DEPRESSION IN REMISSION: ICD-10-CM

## 2023-02-14 RX ORDER — ESCITALOPRAM OXALATE 10 MG/1
10 TABLET ORAL EVERY MORNING
Qty: 90 TABLET | Refills: 3 | Status: CANCELLED | OUTPATIENT
Start: 2023-02-14

## 2023-02-14 NOTE — TELEPHONE ENCOUNTER
Rx Refill Note  Requested Prescriptions     Pending Prescriptions Disp Refills   • escitalopram (LEXAPRO) 10 MG tablet 90 tablet 3     Sig: Take 1 tablet by mouth Every Morning.      Last office visit with prescribing clinician: 12/8/2022   Last telemedicine visit with prescribing clinician: Visit date not found   Next office visit with prescribing clinician: Visit date not found                         Would you like a call back once the refill request has been completed: [] Yes [] No    If the office needs to give you a call back, can they leave a voicemail: [] Yes [] No    Arely Khalil MA  02/14/23, 14:21 EST

## 2023-02-20 DIAGNOSIS — I10 ESSENTIAL HYPERTENSION: ICD-10-CM

## 2023-02-20 DIAGNOSIS — I49.1 PAC (PREMATURE ATRIAL CONTRACTION): ICD-10-CM

## 2023-02-20 RX ORDER — METOPROLOL SUCCINATE 25 MG/1
12.5 TABLET, EXTENDED RELEASE ORAL NIGHTLY
Qty: 45 TABLET | Refills: 3 | Status: SHIPPED | OUTPATIENT
Start: 2023-02-20

## 2023-03-06 ENCOUNTER — OFFICE VISIT (OUTPATIENT)
Dept: ENDOCRINOLOGY | Facility: CLINIC | Age: 83
End: 2023-03-06
Payer: MEDICARE

## 2023-03-06 VITALS
DIASTOLIC BLOOD PRESSURE: 62 MMHG | BODY MASS INDEX: 24.29 KG/M2 | WEIGHT: 132 LBS | SYSTOLIC BLOOD PRESSURE: 124 MMHG | HEIGHT: 62 IN | HEART RATE: 58 BPM | OXYGEN SATURATION: 94 %

## 2023-03-06 DIAGNOSIS — E04.1 SOLITARY THYROID NODULE: ICD-10-CM

## 2023-03-06 DIAGNOSIS — E21.0 PRIMARY HYPERPARATHYROIDISM: Primary | ICD-10-CM

## 2023-03-06 LAB — PTH-INTACT SERPL-MCNC: 125 PG/ML (ref 15–65)

## 2023-03-06 PROCEDURE — 99204 OFFICE O/P NEW MOD 45 MIN: CPT | Performed by: INTERNAL MEDICINE

## 2023-03-06 PROCEDURE — 36415 COLL VENOUS BLD VENIPUNCTURE: CPT | Performed by: INTERNAL MEDICINE

## 2023-03-06 PROCEDURE — 83970 ASSAY OF PARATHORMONE: CPT | Performed by: INTERNAL MEDICINE

## 2023-03-06 PROCEDURE — 80069 RENAL FUNCTION PANEL: CPT | Performed by: INTERNAL MEDICINE

## 2023-03-06 PROCEDURE — 82306 VITAMIN D 25 HYDROXY: CPT | Performed by: INTERNAL MEDICINE

## 2023-03-06 PROCEDURE — 82330 ASSAY OF CALCIUM: CPT | Performed by: INTERNAL MEDICINE

## 2023-03-06 NOTE — PROGRESS NOTES
Chief Complaint   Patient presents with   • Abnormal Calcium        Referring Provider  Ysabel Abdalla MD     HPI   Maya Anand is a 82 y.o. female had concerns including Abnormal Calcium.     Pt has had high calcium levels for years. She is not a surgical candidate due to age/dementia/complications with anesthesia in the past. She is feeling ok. No complaints of joint pains. No history of fractures.   Seems to not eat as much as she used to but no new abd pains.   Has a history of nephrolithiasis. Last kidney stone was 30 years ago.     Last BMD many years ago.    Pt has hypothyroidism and a thyroid nodule. No history of FNA. Wouldn't consider a thyroid surgery either (if ever warranted).     Past Medical History:   Diagnosis Date   • Acid reflux    • Anxiety    • Chronic cerebral ischemia    • Depression    • Frequent UTI    • JOSE (generalized anxiety disorder)    • Herpes simplex    • HTN (hypertension)    • Hypothyroidism    • Memory loss    • Ovarian cyst    • Peripheral edema      Past Surgical History:   Procedure Laterality Date   • COLONOSCOPY W/ BIOPSIES     • OVARIAN CYST REMOVAL     • TOTAL KNEE ARTHROPLASTY Bilateral ,       Family History   Problem Relation Age of Onset   • Stroke Father       Social History     Socioeconomic History   • Marital status:      Spouse name: Janie   • Number of children: 2   Tobacco Use   • Smoking status: Former     Packs/day: 0.25     Years: 5.00     Pack years: 1.25     Types: Cigarettes     Quit date:      Years since quittin.2   • Smokeless tobacco: Never   Vaping Use   • Vaping Use: Never used   Substance and Sexual Activity   • Alcohol use: No   • Drug use: Never   • Sexual activity: Defer      Allergies   Allergen Reactions   • Erythromycin Unknown - Low Severity     unknown   • Penicillins       Current Outpatient Medications on File Prior to Visit   Medication Sig Dispense Refill   • amLODIPine (NORVASC) 2.5 MG tablet Take 1  "tablet by mouth Daily. 90 tablet 3   • Apoaequorin (Prevagen) 10 MG capsule Take 20 mg by mouth Daily.     • AZO-CRANBERRY PO Take  by mouth Daily.     • cyanocobalamin (VITAMIN B-12) 500 MCG tablet Take 2 tablets by mouth Daily.     • D-Mannose 500 MG capsule Take  by mouth 3 (Three) Times a Day.     • donepezil (Aricept) 10 MG tablet Take 1 tablet by mouth Daily. 90 tablet 3   • escitalopram (LEXAPRO) 10 MG tablet Take 1 tablet by mouth Every Morning. 90 tablet 3   • Lactobacillus-Inulin (PROBIOTIC DIGESTIVE SUPPORT PO) Take  by mouth Daily.     • levothyroxine (SYNTHROID, LEVOTHROID) 75 MCG tablet TAKE 1 TABLET EVERY MORNING 90 tablet 3   • memantine (NAMENDA) 10 MG tablet Take 1 tablet by mouth 2 (Two) Times a Day. 30 tablet 11   • metoprolol succinate XL (TOPROL-XL) 25 MG 24 hr tablet Take 0.5 tablets by mouth Every Night. 45 tablet 3   • Mirabegron ER (Myrbetriq) 50 MG tablet sustained-release 24 hour 24 hr tablet Take 50 mg by mouth Daily. 90 tablet 3   • Multiple Vitamins-Minerals (PRESERVISION AREDS 2 PO) Take  by mouth Daily.     • valACYclovir (Valtrex) 1000 MG tablet Take 2 tablets by mouth 2 (Two) Times a Day. For 1 day per episode 8 tablet 11     No current facility-administered medications on file prior to visit.        Review of Systems   Unable to perform ROS: Dementia        /62 (BP Location: Left arm, Patient Position: Sitting, Cuff Size: Adult)   Pulse 58   Ht 157.5 cm (62.01\")   Wt 59.9 kg (132 lb)   SpO2 94%   BMI 24.14 kg/m²      Physical Exam    Constitutional:  well developed; well nourished  no acute distress   ENT/Thyroid: enlarged, nodule right lobe 2 cm   Eyes: EOM intact  Conjunctiva: clear   Respiratory:  breathing is unlabored  clear to auscultation bilaterally   Cardiovascular:  regular rate and rhythm, S1, S2 normal, no murmur, click, rub or gallop   Chest:  Not performed.   Abdomen: Not performed.   : Not performed.   Musculoskeletal: negative findings:  ROM of all " joints is normal, no deformities present   Skin: dry and warm   Neuro: normal without focal findings    Psych: oriented to time, place and person, mood and affect are within normal limits     Labs/Imaging  CMP  Lab Results   Component Value Date    GLUCOSE 91 12/08/2022    BUN 18 12/08/2022    CREATININE 1.14 (H) 12/08/2022    EGFRIFNONA 32 (L) 05/07/2021    BCR 15.8 12/08/2022    K 4.5 12/08/2022    CO2 27.3 12/08/2022    CALCIUM 11.0 (H) 12/08/2022    CALCIUM 11.1 (H) 12/08/2022    ALBUMIN 4.00 12/08/2022    AST 21 12/08/2022    ALT 9 12/08/2022        CBC w/DIFF   Lab Results   Component Value Date    WBC 7.63 12/08/2022    RBC 4.07 12/08/2022    HGB 11.6 (L) 12/08/2022    HCT 35.5 12/08/2022    MCV 87.2 12/08/2022    MCH 28.5 12/08/2022    MCHC 32.7 12/08/2022    RDW 13.2 12/08/2022    RDWSD 41.7 12/08/2022    MPV 11.7 12/08/2022     12/08/2022    NEUTRORELPCT 67.9 05/07/2021    LYMPHORELPCT 20.4 05/07/2021    MONORELPCT 8.8 05/07/2021    EOSRELPCT 1.8 05/07/2021    BASORELPCT 0.7 05/07/2021    AUTOIGPER 0.4 05/07/2021    NEUTROABS 5.17 05/07/2021    LYMPHSABS 1.55 05/07/2021    MONOSABS 0.67 05/07/2021    EOSABS 0.14 05/07/2021    BASOSABS 0.05 05/07/2021    AUTOIGNUM 0.03 05/07/2021    NRBC 0.0 05/07/2021       TSH  Lab Results   Component Value Date    TSH 3.430 12/08/2022    TSH 2.580 08/18/2022    TSH 0.847 05/07/2021       T4  Lab Results   Component Value Date    FREET4 1.35 08/18/2022    FREET4 1.55 05/07/2021    FREET4 1.20 09/06/2016     Lab Results   Component Value Date    G7VHCHZ 11.6 (H) 06/30/2014     Lab Results   Component Value Date    .0 (H) 12/08/2022    CALCIUM 11.0 (H) 12/08/2022    CALCIUM 11.1 (H) 12/08/2022    CALCIUM 11.0 (H) 08/18/2022    CALCIUM 11.2 (H) 05/07/2021    CALCIUM 10.2 06/30/2014       EXAMINATION: NM PARATHYROID SCAN-12/08/2021:      INDICATION: E83.52/E21.0; E83.52-Hypercalcemia; E21.0-Primary  hyperparathyroidism.     TECHNIQUE: Radiopharmaceutical: 21.9  mCi of Technetium 99m Sestamibi,  IV.     COMPARISON: NONE.     FINDINGS: Images are obtained at 20 minutes and two hours postinjection.  The 20-minute images show diffuse thyroid gland uptake, with focus of  asymmetrically increased uptake in the right lower thyroid pole. Also  normal variant submandibular gland uptake.     Two hour delayed images show generally diminished activity in the  thyroid but persistent, stronger activity in the right lower thyroid  pole than elsewhere. Considerations include thyroid adenoma and  parathyroid adenoma.     IMPRESSION:  Persistent focus of asymmetrically increased activity in the  right lower thyroid pole as described.     D:  12/09/2021  E:  12/09/2021     This report was finalized on 12/9/2021 10:13 PM by Dr. Avinash Velez MD.  EXAMINATION: US THYROID-03/13/2018:     INDICATION: PAIN; E04.1-Nontoxic single thyroid nodule.      TECHNIQUE: Ultrasound of the thyroid and adjacent soft tissues of the  neck.     COMPARISON: NONE.     FINDINGS:  The thyroid isthmus measures 1 cm in thickness without  nodule.     The right thyroid lobe measures 5 x 2 x 2.4 cm demonstrating  heterogeneity of the parenchyma and containing a 1.4 cm isoechoic nodule  without evidence of microcalcifications or significant internal flow on  Doppler interrogation.     The left thyroid lobe measures 4.2 x 1.8 x 1.6 cm demonstrating  heterogeneity of the parenchyma, however, no dominant nodule of concern.     IMPRESSION:  1. Mildly heterogeneous thyroid gland with appropriate flow on Doppler  interrogation.  2. Dominant nodule right thyroid lobe measures 1.4 cm and is isoechoic  in signal without evidence of microcalcifications. Recommend followup  within 6 months to ensure stability.     D:  03/13/2018  E:  03/13/2018            This report was finalized on 3/13/2018 1:54 PM by Dr. Delgado Franklin.         Assessment and Plan    Diagnoses and all orders for this visit:    1. Primary hyperparathyroidism (HCC)  (Primary)  Previous work-up consistent with primary hyperparathyroidism with PTH level 147, total calcium 11.0.  Patient is not a surgical candidate due to dementia, age, complications with anesthesia.  Recheck labs for new baseline today and plan to start Sensipar.  Remote history of nephrolithiasis.  BMD has not been updated recently though family is hoping to minimize necessary medications.  We will manage conservatively considering comorbidities.    -     Vitamin D,25-Hydroxy  -     Calcium, Ionized  -     PTH, Intact  -     Renal Function Panel    2. Solitary thyroid nodule  Ultrasound report and images reviewed from 2018.  She has palpable thyroid nodule on exam.  As above, patient and family prefer noninvasive approaches to medical management. No ultrasound monitoring is necessary.       Return in about 3 months (around 6/6/2023) for next scheduled follow up. The patient was instructed to contact the clinic with any interval questions or concerns.    Gabriella Reese, DO   Endocrinologist    Please note that portions of this note were completed with a voice recognition program.

## 2023-03-07 DIAGNOSIS — E21.0 PRIMARY HYPERPARATHYROIDISM: Primary | ICD-10-CM

## 2023-03-07 LAB
25(OH)D3 SERPL-MCNC: 53.1 NG/ML (ref 30–100)
ALBUMIN SERPL-MCNC: 3.7 G/DL (ref 3.5–5.2)
ANION GAP SERPL CALCULATED.3IONS-SCNC: 6.4 MMOL/L (ref 5–15)
BUN SERPL-MCNC: 14 MG/DL (ref 8–23)
BUN/CREAT SERPL: 13.3 (ref 7–25)
CA-I BLD-MCNC: 6.2 MG/DL (ref 4.6–5.4)
CA-I SERPL ISE-MCNC: 1.55 MMOL/L (ref 1.15–1.35)
CALCIUM SPEC-SCNC: 11.1 MG/DL (ref 8.6–10.5)
CHLORIDE SERPL-SCNC: 106 MMOL/L (ref 98–107)
CO2 SERPL-SCNC: 27.6 MMOL/L (ref 22–29)
CREAT SERPL-MCNC: 1.05 MG/DL (ref 0.57–1)
EGFRCR SERPLBLD CKD-EPI 2021: 53.2 ML/MIN/1.73
GLUCOSE SERPL-MCNC: 86 MG/DL (ref 65–99)
PHOSPHATE SERPL-MCNC: 2.7 MG/DL (ref 2.5–4.5)
POTASSIUM SERPL-SCNC: 3.8 MMOL/L (ref 3.5–5.2)
SODIUM SERPL-SCNC: 140 MMOL/L (ref 136–145)

## 2023-03-07 RX ORDER — CINACALCET 30 MG/1
30 TABLET, FILM COATED ORAL DAILY
Qty: 90 TABLET | Refills: 1 | Status: SHIPPED | OUTPATIENT
Start: 2023-03-07

## 2023-03-07 RX ORDER — CINACALCET 30 MG/1
30 TABLET, FILM COATED ORAL DAILY
Qty: 30 TABLET | Refills: 3 | Status: SHIPPED | OUTPATIENT
Start: 2023-03-07 | End: 2023-03-07

## 2023-03-13 ENCOUNTER — OFFICE VISIT (OUTPATIENT)
Dept: NEUROLOGY | Facility: CLINIC | Age: 83
End: 2023-03-13
Payer: MEDICARE

## 2023-03-13 VITALS
HEART RATE: 61 BPM | HEIGHT: 62 IN | SYSTOLIC BLOOD PRESSURE: 122 MMHG | WEIGHT: 132 LBS | OXYGEN SATURATION: 98 % | BODY MASS INDEX: 24.29 KG/M2 | TEMPERATURE: 97.2 F | DIASTOLIC BLOOD PRESSURE: 64 MMHG

## 2023-03-13 DIAGNOSIS — N18.30 STAGE 3 CHRONIC KIDNEY DISEASE, UNSPECIFIED WHETHER STAGE 3A OR 3B CKD: ICD-10-CM

## 2023-03-13 DIAGNOSIS — R41.89 BEHAVIOR RELATED TO COGNITIVE IMPAIRMENT: ICD-10-CM

## 2023-03-13 DIAGNOSIS — F02.80 ALZHEIMER'S DISEASE: Primary | ICD-10-CM

## 2023-03-13 DIAGNOSIS — G30.9 ALZHEIMER'S DISEASE: Primary | ICD-10-CM

## 2023-03-13 DIAGNOSIS — R26.9 GAIT DISORDER: ICD-10-CM

## 2023-03-13 DIAGNOSIS — E83.52 HYPERCALCEMIA: ICD-10-CM

## 2023-03-13 PROCEDURE — 99214 OFFICE O/P EST MOD 30 MIN: CPT | Performed by: NURSE PRACTITIONER

## 2023-03-13 PROCEDURE — 1159F MED LIST DOCD IN RCRD: CPT | Performed by: NURSE PRACTITIONER

## 2023-03-13 PROCEDURE — 1160F RVW MEDS BY RX/DR IN RCRD: CPT | Performed by: NURSE PRACTITIONER

## 2023-03-13 PROCEDURE — 3078F DIAST BP <80 MM HG: CPT | Performed by: NURSE PRACTITIONER

## 2023-03-13 PROCEDURE — 3074F SYST BP LT 130 MM HG: CPT | Performed by: NURSE PRACTITIONER

## 2023-03-13 RX ORDER — ARIPIPRAZOLE 5 MG/1
5 TABLET ORAL DAILY
Qty: 90 TABLET | Refills: 1 | Status: SHIPPED | OUTPATIENT
Start: 2023-03-13

## 2023-03-13 RX ORDER — ARIPIPRAZOLE 5 MG/1
5 TABLET ORAL DAILY
Qty: 30 TABLET | Refills: 5 | Status: SHIPPED | OUTPATIENT
Start: 2023-03-13 | End: 2023-03-13 | Stop reason: SDUPTHER

## 2023-03-13 NOTE — PROGRESS NOTES
Neuro Office Visit      Encounter Date: 2023   Patient Name: Maya Anand  : 1940   MRN: [id number]   PCP: DR Abdalla  Chief Complaint:    Chief Complaint   Patient presents with   • Alzheimer's Disease       History of Present Illness: Maya Anand is a 82 y.o. female who is here today in Neurology w her  and daughter for memory loss    Former patient of CRISTIAN Fuentes transferring her care to us today.    Last visit 2022 w CRISTIAN Fuentes-refer to home health, add namenda cont aricept.    Seeing Dr Reese for elevated calcium    Alzheimer's disease    Family noted change in behavior with [facility] and refusal of care for  PT/OT. Pt does not want anyone in the house. Continues to have frequent falls. Using rolling walker at home.    MMSE today /30. Disoriented to date and day, cannot recall 3words  Meds:aricept and prevagen  Med management: . Will miss days  Appetite: decreased  Sleep:dreaming and acting out dreams  ADLs:can feed herself but need assistance with bathing and dressing  Gait/Falls:shuffling, leaning forward. Fell in shower. Difficult to get up  Language:no change  Dysphagia:no  Driving:not driving  Hallucinations:none  Behavior:arguementative  Living situation:lives with   Finances:  POA:Both daughters      PH  Onset  w forgetfulness, word finding difficulty w assoc anxiety and depression.  MRI Brain Without Contrast (2021 12:00)-atrophy and small vessel disease    PMH: alz disease, renal failure, hypercalcemia  Subjective      Past Medical History:   Past Medical History:   Diagnosis Date   • Acid reflux    • Anxiety    • Chronic cerebral ischemia    • Depression    • Frequent UTI    • JOSE (generalized anxiety disorder)    • Herpes simplex    • HTN (hypertension)    • Hypothyroidism    • Memory loss    • Ovarian cyst    • Peripheral edema        Past Surgical History:   Past Surgical History:   Procedure Laterality Date   •  COLONOSCOPY W/ BIOPSIES     • OVARIAN CYST REMOVAL     • TOTAL KNEE ARTHROPLASTY Bilateral ,        Family History:   Family History   Problem Relation Age of Onset   • Stroke Father        Social History:   Social History     Socioeconomic History   • Marital status:      Spouse name: Janie   • Number of children: 2   Tobacco Use   • Smoking status: Former     Packs/day: 0.25     Years: 5.00     Pack years: 1.25     Types: Cigarettes     Quit date:      Years since quittin.2   • Smokeless tobacco: Never   Vaping Use   • Vaping Use: Never used   Substance and Sexual Activity   • Alcohol use: No   • Drug use: Never   • Sexual activity: Defer       Medications:     Current Outpatient Medications:   •  amLODIPine (NORVASC) 2.5 MG tablet, Take 1 tablet by mouth Daily., Disp: 90 tablet, Rfl: 3  •  Apoaequorin (Prevagen) 10 MG capsule, Take 20 mg by mouth Daily., Disp: , Rfl:   •  ARIPiprazole (ABILIFY) 5 MG tablet, Take 1 tablet by mouth Daily., Disp: 90 tablet, Rfl: 1  •  cinacalcet (Sensipar) 30 MG tablet, Take 1 tablet by mouth Daily., Disp: 90 tablet, Rfl: 1  •  cyanocobalamin (VITAMIN B-12) 500 MCG tablet, Take 2 tablets by mouth Daily., Disp: , Rfl:   •  D-Mannose 500 MG capsule, Take  by mouth 3 (Three) Times a Day., Disp: , Rfl:   •  donepezil (Aricept) 10 MG tablet, Take 1 tablet by mouth Daily., Disp: 90 tablet, Rfl: 3  •  escitalopram (LEXAPRO) 10 MG tablet, Take 1 tablet by mouth Every Morning., Disp: 90 tablet, Rfl: 3  •  Lactobacillus-Inulin (PROBIOTIC DIGESTIVE SUPPORT PO), Take  by mouth Daily., Disp: , Rfl:   •  levothyroxine (SYNTHROID, LEVOTHROID) 75 MCG tablet, TAKE 1 TABLET EVERY MORNING, Disp: 90 tablet, Rfl: 3  •  memantine (NAMENDA) 10 MG tablet, Take 1 tablet by mouth 2 (Two) Times a Day., Disp: 30 tablet, Rfl: 11  •  metoprolol succinate XL (TOPROL-XL) 25 MG 24 hr tablet, Take 0.5 tablets by mouth Every Night., Disp: 45 tablet, Rfl: 3  •  Mirabegron ER (Myrbetriq) 50  MG tablet sustained-release 24 hour 24 hr tablet, Take 50 mg by mouth Daily., Disp: 90 tablet, Rfl: 3  •  Multiple Vitamins-Minerals (PRESERVISION AREDS 2 PO), Take  by mouth Daily., Disp: , Rfl:   •  valACYclovir (Valtrex) 1000 MG tablet, Take 2 tablets by mouth 2 (Two) Times a Day. For 1 day per episode, Disp: 8 tablet, Rfl: 11    Allergies:   Allergies   Allergen Reactions   • Erythromycin Unknown - Low Severity     unknown   • Penicillins        PHQ-9 Total Score:     STEADI Fall Risk Assessment was completed, and patient is at HIGH risk for falls. Assessment completed on:3/13/2023    Objective     Physical Exam:   Physical Exam  Eyes:      Pupils: Pupils are equal, round, and reactive to light.   Neurological:      Coordination: Finger-Nose-Finger Test normal.      Deep Tendon Reflexes:      Reflex Scores:       Tricep reflexes are 2+ on the right side and 2+ on the left side.       Bicep reflexes are 2+ on the right side and 2+ on the left side.       Brachioradialis reflexes are 2+ on the right side and 2+ on the left side.       Patellar reflexes are 2+ on the right side and 2+ on the left side.       Achilles reflexes are 2+ on the right side and 2+ on the left side.  Psychiatric:         Speech: Speech normal.         Neurologic Exam     Mental Status   Disoriented to person.   Oriented to place.   Oriented to time.   Registration: recalls 3 of 3 objects. Recall of objects at 5 minutes: 0/3 recall. Follows 2 step commands.   Attention: normal. Concentration: normal.   Speech: speech is normal   Level of consciousness: alert  Knowledge: consistent with education.   Normal comprehension.     Cranial Nerves     CN III, IV, VI   Pupils are equal, round, and reactive to light.  Right pupil: Accommodation: intact.   Left pupil: Accommodation: intact.   CN III: no CN III palsy  CN VI: no CN VI palsy  Nystagmus: none   Diplopia: none  Upgaze: normal  Downgaze: normal  Conjugate gaze: present    CN VII   Facial  "expression full, symmetric.     CN VIII   Hearing: intact    CN XII   CN XII normal.     Motor Exam   Muscle bulk: normal  Overall muscle tone: normal    Strength   Right biceps: 5/5  Left biceps: 5/5  Right triceps: 5/5  Left triceps: 5/5  Right interossei: 5/5  Left interossei: 5/5  Right quadriceps: 5/5  Left quadriceps: 5/5  Right anterior tibial: 5/5  Left anterior tibial: 5/5  Right posterior tibial: 5/5  Left posterior tibial: 5/5    Sensory Exam   Light touch normal.     Gait, Coordination, and Reflexes     Coordination   Finger to nose coordination: normal    Tremor   Resting tremor: absent  Action tremor: absent    Reflexes   Right brachioradialis: 2+  Left brachioradialis: 2+  Right biceps: 2+  Left biceps: 2+  Right triceps: 2+  Left triceps: 2+  Right patellar: 2+  Left patellar: 2+  Right achilles: 2+  Left achilles: 2+  Right : 2+  Left : 2+Short steps with assist x 2.        Vital Signs:   Vitals:    03/13/23 0911   BP: 122/64   Pulse: 61   Temp: 97.2 °F (36.2 °C)   SpO2: 98%   Weight: 59.9 kg (132 lb)   Height: 157.5 cm (62.01\")     Body mass index is 24.14 kg/m².         Assessment / Plan      Assessment/Plan:   Diagnoses and all orders for this visit:    1. Alzheimer's disease (HCC) (Primary)  Comments:  Cont Aricept and Namenda.  Orders:  -     ARIPiprazole (ABILIFY) 5 MG tablet; Take 1 tablet by mouth Daily.  Dispense: 90 tablet; Refill: 1    2. Gait disorder  Comments:  Cont to use walker, potty chair, shower chair to prevent falls. Pt refused PT.    3. Behavior related to cognitive impairment  Comments:  Add abilify    4. Stage 3 chronic kidney disease, unspecified whether stage 3a or 3b CKD (HCC)  Comments:  Cont meds at current dose. Will follow with nephrology for recommendations in dosing if needed.    5. Hypercalcemia  Comments:  Per Dr Reese    Other orders  -     Discontinue: ARIPiprazole (ABILIFY) 5 MG tablet; Take 1 tablet by mouth Daily.  Dispense: 30 tablet; Refill: 5     " Discussed with family that pt is on maximum medication for memory loss. Will add Abilify to help with behavior issues. Gave information for community resources. Consider in home care or placement for patient safety.      Patient Education:       Reviewed medications, potential side effects and signs and symptoms to report. Discussed risk versus benefits of treatment plan with patient and/or family-including medications, labs and radiology that may be ordered. Addressed questions and concerns during visit. Patient and/or family verbalized understanding and agree with plan. Instructed to call the office with any questions and report to ER with any life-threatening symptoms.     Follow Up:   Return in about 6 months (around 9/13/2023).    During this visit the following were done:  Labs Reviewed []    Labs Ordered []    Radiology Reports Reviewed []    Radiology Ordered []    PCP Records Reviewed []    Referring Provider Records Reviewed []    ER Records Reviewed []    Hospital Records Reviewed []    History Obtained From Family []    Radiology Images Reviewed []    Other Reviewed [x]    Records Requested []      Reagan Corral, DNP, APRN

## 2023-03-16 ENCOUNTER — PATIENT ROUNDING (BHMG ONLY) (OUTPATIENT)
Dept: NEUROLOGY | Facility: CLINIC | Age: 83
End: 2023-03-16
Payer: MEDICARE

## 2023-03-16 NOTE — PROGRESS NOTES
March 16, 2023    Hello, may I speak with Maya Anand?    My name is [email or web address]    I am  with [facility]  [address] [place] [address]  [place] [place] [place] [address] [phone].    Before we get started may I verify your date of birth? 1940    I am calling to officially welcome you to our practice and ask about your recent visit. Is this a good time to talk?   Patient rounding sent through [employer].

## 2023-03-19 ENCOUNTER — HOSPITAL ENCOUNTER (EMERGENCY)
Facility: HOSPITAL | Age: 83
Discharge: HOME | End: 2023-03-19
Attending: EMERGENCY MEDICINE | Admitting: EMERGENCY MEDICINE
Payer: MEDICARE

## 2023-03-19 ENCOUNTER — APPOINTMENT (OUTPATIENT)
Dept: GENERAL RADIOLOGY | Facility: HOSPITAL | Age: 83
End: 2023-03-19
Payer: MEDICARE

## 2023-03-19 VITALS
TEMPERATURE: 97.9 F | OXYGEN SATURATION: 96 % | DIASTOLIC BLOOD PRESSURE: 85 MMHG | BODY MASS INDEX: 24.29 KG/M2 | HEIGHT: 62 IN | WEIGHT: 132 LBS | HEART RATE: 71 BPM | RESPIRATION RATE: 16 BRPM | SYSTOLIC BLOOD PRESSURE: 176 MMHG

## 2023-03-19 DIAGNOSIS — E83.52 SERUM CALCIUM ELEVATED: ICD-10-CM

## 2023-03-19 DIAGNOSIS — N30.00 ACUTE CYSTITIS WITHOUT HEMATURIA: Primary | ICD-10-CM

## 2023-03-19 DIAGNOSIS — R53.1 GENERALIZED WEAKNESS: ICD-10-CM

## 2023-03-19 DIAGNOSIS — Z86.59 HISTORY OF DEMENTIA: ICD-10-CM

## 2023-03-19 LAB
ALBUMIN SERPL-MCNC: 3.7 G/DL (ref 3.5–5.2)
ALBUMIN/GLOB SERPL: 1.2 G/DL
ALP SERPL-CCNC: 91 U/L (ref 39–117)
ALT SERPL W P-5'-P-CCNC: 5 U/L (ref 1–33)
ANION GAP SERPL CALCULATED.3IONS-SCNC: 9 MMOL/L (ref 5–15)
AST SERPL-CCNC: 15 U/L (ref 1–32)
BACTERIA UR QL AUTO: ABNORMAL /HPF
BASOPHILS # BLD AUTO: 0.02 10*3/MM3 (ref 0–0.2)
BASOPHILS NFR BLD AUTO: 0.2 % (ref 0–1.5)
BILIRUB SERPL-MCNC: 0.4 MG/DL (ref 0–1.2)
BILIRUB UR QL STRIP: NEGATIVE
BUN SERPL-MCNC: 13 MG/DL (ref 8–23)
BUN/CREAT SERPL: 14 (ref 7–25)
CALCIUM SPEC-SCNC: 10.9 MG/DL (ref 8.6–10.5)
CHLORIDE SERPL-SCNC: 106 MMOL/L (ref 98–107)
CLARITY UR: ABNORMAL
CO2 SERPL-SCNC: 28 MMOL/L (ref 22–29)
COLOR UR: YELLOW
CREAT SERPL-MCNC: 0.93 MG/DL (ref 0.57–1)
DEPRECATED RDW RBC AUTO: 52.4 FL (ref 37–54)
EGFRCR SERPLBLD CKD-EPI 2021: 61.5 ML/MIN/1.73
EOSINOPHIL # BLD AUTO: 0.11 10*3/MM3 (ref 0–0.4)
EOSINOPHIL NFR BLD AUTO: 0.9 % (ref 0.3–6.2)
ERYTHROCYTE [DISTWIDTH] IN BLOOD BY AUTOMATED COUNT: 15.4 % (ref 12.3–15.4)
FLUAV RNA RESP QL NAA+PROBE: NOT DETECTED
FLUBV RNA RESP QL NAA+PROBE: NOT DETECTED
GLOBULIN UR ELPH-MCNC: 3 GM/DL
GLUCOSE SERPL-MCNC: 111 MG/DL (ref 65–99)
GLUCOSE UR STRIP-MCNC: NEGATIVE MG/DL
HCT VFR BLD AUTO: 39.9 % (ref 34–46.6)
HGB BLD-MCNC: 12.3 G/DL (ref 12–15.9)
HGB UR QL STRIP.AUTO: NEGATIVE
HOLD SPECIMEN: NORMAL
HYALINE CASTS UR QL AUTO: ABNORMAL /LPF
IMM GRANULOCYTES # BLD AUTO: 0.07 10*3/MM3 (ref 0–0.05)
IMM GRANULOCYTES NFR BLD AUTO: 0.6 % (ref 0–0.5)
KETONES UR QL STRIP: NEGATIVE
LEUKOCYTE ESTERASE UR QL STRIP.AUTO: ABNORMAL
LYMPHOCYTES # BLD AUTO: 1.13 10*3/MM3 (ref 0.7–3.1)
LYMPHOCYTES NFR BLD AUTO: 8.9 % (ref 19.6–45.3)
MAGNESIUM SERPL-MCNC: 1.9 MG/DL (ref 1.6–2.4)
MCH RBC QN AUTO: 28.4 PG (ref 26.6–33)
MCHC RBC AUTO-ENTMCNC: 30.8 G/DL (ref 31.5–35.7)
MCV RBC AUTO: 92.1 FL (ref 79–97)
MONOCYTES # BLD AUTO: 0.72 10*3/MM3 (ref 0.1–0.9)
MONOCYTES NFR BLD AUTO: 5.7 % (ref 5–12)
NEUTROPHILS NFR BLD AUTO: 10.63 10*3/MM3 (ref 1.7–7)
NEUTROPHILS NFR BLD AUTO: 83.7 % (ref 42.7–76)
NITRITE UR QL STRIP: POSITIVE
NRBC BLD AUTO-RTO: 0 /100 WBC (ref 0–0.2)
PH UR STRIP.AUTO: 6.5 [PH] (ref 5–8)
PLATELET # BLD AUTO: 245 10*3/MM3 (ref 140–450)
PMV BLD AUTO: 9.8 FL (ref 6–12)
POTASSIUM SERPL-SCNC: 4 MMOL/L (ref 3.5–5.2)
PROT SERPL-MCNC: 6.7 G/DL (ref 6–8.5)
PROT UR QL STRIP: ABNORMAL
RBC # BLD AUTO: 4.33 10*6/MM3 (ref 3.77–5.28)
RBC # UR STRIP: ABNORMAL /HPF
REF LAB TEST METHOD: ABNORMAL
SARS-COV-2 RNA RESP QL NAA+PROBE: NOT DETECTED
SODIUM SERPL-SCNC: 143 MMOL/L (ref 136–145)
SP GR UR STRIP: 1.01 (ref 1–1.03)
SQUAMOUS #/AREA URNS HPF: ABNORMAL /HPF
TROPONIN T SERPL HS-MCNC: 16 NG/L
UROBILINOGEN UR QL STRIP: ABNORMAL
WBC # UR STRIP: ABNORMAL /HPF
WBC NRBC COR # BLD: 12.68 10*3/MM3 (ref 3.4–10.8)
WHOLE BLOOD HOLD COAG: NORMAL
WHOLE BLOOD HOLD SPECIMEN: NORMAL

## 2023-03-19 PROCEDURE — 25010000002 CEFTRIAXONE PER 250 MG: Performed by: EMERGENCY MEDICINE

## 2023-03-19 PROCEDURE — 87077 CULTURE AEROBIC IDENTIFY: CPT | Performed by: EMERGENCY MEDICINE

## 2023-03-19 PROCEDURE — 84484 ASSAY OF TROPONIN QUANT: CPT

## 2023-03-19 PROCEDURE — 71045 X-RAY EXAM CHEST 1 VIEW: CPT

## 2023-03-19 PROCEDURE — 83735 ASSAY OF MAGNESIUM: CPT

## 2023-03-19 PROCEDURE — 93005 ELECTROCARDIOGRAM TRACING: CPT | Performed by: EMERGENCY MEDICINE

## 2023-03-19 PROCEDURE — 99284 EMERGENCY DEPT VISIT MOD MDM: CPT

## 2023-03-19 PROCEDURE — 96365 THER/PROPH/DIAG IV INF INIT: CPT

## 2023-03-19 PROCEDURE — 93005 ELECTROCARDIOGRAM TRACING: CPT

## 2023-03-19 PROCEDURE — 87086 URINE CULTURE/COLONY COUNT: CPT | Performed by: EMERGENCY MEDICINE

## 2023-03-19 PROCEDURE — 87186 SC STD MICRODIL/AGAR DIL: CPT | Performed by: EMERGENCY MEDICINE

## 2023-03-19 PROCEDURE — 87636 SARSCOV2 & INF A&B AMP PRB: CPT

## 2023-03-19 PROCEDURE — 81001 URINALYSIS AUTO W/SCOPE: CPT

## 2023-03-19 PROCEDURE — 80053 COMPREHEN METABOLIC PANEL: CPT

## 2023-03-19 PROCEDURE — P9612 CATHETERIZE FOR URINE SPEC: HCPCS

## 2023-03-19 PROCEDURE — 85025 COMPLETE CBC W/AUTO DIFF WBC: CPT

## 2023-03-19 RX ORDER — SODIUM CHLORIDE 0.9 % (FLUSH) 0.9 %
10 SYRINGE (ML) INJECTION AS NEEDED
Status: DISCONTINUED | OUTPATIENT
Start: 2023-03-19 | End: 2023-03-19 | Stop reason: HOSPADM

## 2023-03-19 RX ORDER — CEFDINIR 300 MG/1
300 CAPSULE ORAL 2 TIMES DAILY
Qty: 20 CAPSULE | Refills: 0 | Status: SHIPPED | OUTPATIENT
Start: 2023-03-19 | End: 2023-03-29

## 2023-03-19 RX ORDER — ONDANSETRON 4 MG/1
4 TABLET, ORALLY DISINTEGRATING ORAL EVERY 6 HOURS PRN
Qty: 12 TABLET | Refills: 0 | Status: SHIPPED | OUTPATIENT
Start: 2023-03-19

## 2023-03-19 RX ADMIN — SODIUM CHLORIDE 1 G: 900 INJECTION INTRAVENOUS at 10:55

## 2023-03-19 RX ADMIN — SODIUM CHLORIDE 500 ML: 9 INJECTION, SOLUTION INTRAVENOUS at 10:55

## 2023-03-19 NOTE — ED PROVIDER NOTES
[place]    EMERGENCY DEPARTMENT ENCOUNTER      Pt Name: Maya Anand  MRN: [id number]  YOB: 1940  Date of evaluation: 3/19/2023  Provider: Jon Soliz MD    CHIEF COMPLAINT       Chief Complaint   Patient presents with   • Weakness - Generalized         HISTORY OF PRESENT ILLNESS   Maya Anand is a 82 y.o. female who presents to the emergency department with generalized weakness over the course of the past day.  Patient's daughter is at bedside and provides additional history.  She tells me that patient has history of frequent urinary tract infections and typically gets weak with this.  She states that she is otherwise at her cognitive baseline with some mild dementia and has been ambulating and eating without difficulty.  Patient herself tells me that she feels a little weak but otherwise has no complaints.  She has had no recent falls and denies any headache, chest pain, shortness of breath, cough, fever, chills, abdominal pain, flank pain.      Nursing notes were reviewed.    REVIEW OF SYSTEMS     ROS:  A chief complaint appropriate review of systems was completed and is negative except as noted in the HPI.      PAST MEDICAL HISTORY     Past Medical History:   Diagnosis Date   • Acid reflux    • Anxiety    • Chronic cerebral ischemia    • Depression    • Frequent UTI    • JOSE (generalized anxiety disorder)    • Herpes simplex    • HTN (hypertension)    • Hypothyroidism    • Memory loss    • Ovarian cyst    • Peripheral edema          SURGICAL HISTORY       Past Surgical History:   Procedure Laterality Date   • COLONOSCOPY W/ BIOPSIES  2016   • OVARIAN CYST REMOVAL     • TOTAL KNEE ARTHROPLASTY Bilateral 2010, 2012         CURRENT MEDICATIONS       Current Facility-Administered Medications:   •  cefTRIAXone (ROCEPHIN) 1 g/100 mL 0.9% NS (MBP), 1 g, Intravenous, Once, Jon Soliz MD  •  sodium chloride 0.9 % bolus 500 mL, 500 mL, Intravenous, Once, Jon Soliz MD  •  sodium  chloride 0.9 % flush 10 mL, 10 mL, Intravenous, PRN, Jon Soliz MD    Current Outpatient Medications:   •  amLODIPine (NORVASC) 2.5 MG tablet, Take 1 tablet by mouth Daily., Disp: 90 tablet, Rfl: 3  •  Apoaequorin (Prevagen) 10 MG capsule, Take 20 mg by mouth Daily., Disp: , Rfl:   •  ARIPiprazole (ABILIFY) 5 MG tablet, Take 1 tablet by mouth Daily., Disp: 90 tablet, Rfl: 1  •  cefdinir (OMNICEF) 300 MG capsule, Take 1 capsule by mouth 2 (Two) Times a Day for 10 days., Disp: 20 capsule, Rfl: 0  •  cinacalcet (Sensipar) 30 MG tablet, Take 1 tablet by mouth Daily., Disp: 90 tablet, Rfl: 1  •  cyanocobalamin (VITAMIN B-12) 500 MCG tablet, Take 2 tablets by mouth Daily., Disp: , Rfl:   •  D-Mannose 500 MG capsule, Take  by mouth 3 (Three) Times a Day., Disp: , Rfl:   •  donepezil (Aricept) 10 MG tablet, Take 1 tablet by mouth Daily., Disp: 90 tablet, Rfl: 3  •  escitalopram (LEXAPRO) 10 MG tablet, Take 1 tablet by mouth Every Morning., Disp: 90 tablet, Rfl: 3  •  Lactobacillus-Inulin (PROBIOTIC DIGESTIVE SUPPORT PO), Take  by mouth Daily., Disp: , Rfl:   •  levothyroxine (SYNTHROID, LEVOTHROID) 75 MCG tablet, TAKE 1 TABLET EVERY MORNING, Disp: 90 tablet, Rfl: 3  •  memantine (NAMENDA) 10 MG tablet, Take 1 tablet by mouth 2 (Two) Times a Day., Disp: 30 tablet, Rfl: 11  •  metoprolol succinate XL (TOPROL-XL) 25 MG 24 hr tablet, Take 0.5 tablets by mouth Every Night., Disp: 45 tablet, Rfl: 3  •  Mirabegron ER (Myrbetriq) 50 MG tablet sustained-release 24 hour 24 hr tablet, Take 50 mg by mouth Daily., Disp: 90 tablet, Rfl: 3  •  Multiple Vitamins-Minerals (PRESERVISION AREDS 2 PO), Take  by mouth Daily., Disp: , Rfl:   •  ondansetron ODT (ZOFRAN-ODT) 4 MG disintegrating tablet, Place 1 tablet on the tongue Every 6 (Six) Hours As Needed for Nausea or Vomiting., Disp: 12 tablet, Rfl: 0  •  valACYclovir (Valtrex) 1000 MG tablet, Take 2 tablets by mouth 2 (Two) Times a Day. For 1 day per episode, Disp: 8 tablet, Rfl:  "11    ALLERGIES     Erythromycin and Penicillins    FAMILY HISTORY       Family History   Problem Relation Age of Onset   • Stroke Father           SOCIAL HISTORY       Social History     Socioeconomic History   • Marital status:      Spouse name: Janie   • Number of children: 2   Tobacco Use   • Smoking status: Former     Packs/day: 0.25     Years: 5.00     Pack years: 1.25     Types: Cigarettes     Quit date:      Years since quittin.2   • Smokeless tobacco: Never   Vaping Use   • Vaping Use: Never used   Substance and Sexual Activity   • Alcohol use: No   • Drug use: Never   • Sexual activity: Defer         PHYSICAL EXAM    (up to 7 for level 4, 8 or more for level 5)     Vitals:    23 0928 23 0937 23 0938 23 0939   BP: 168/93      Pulse: 64 66     Resp:   16    Temp:   97.9 °F (36.6 °C)    TempSrc:   Oral    SpO2: 96% 96%     Weight:    59.9 kg (132 lb)   Height:    157.5 cm (62\")       General: Awake, alert, no acute distress.  HEENT: Conjunctivae normal.  Neck: Trachea midline.  Cardiac: Heart regular rate, rhythm, no murmurs, rubs, or gallops  Lungs: Lungs are clear to auscultation, there is no wheezing, rhonchi, or rales. There is no use of accessory muscles.  Chest wall: There is no tenderness to palpation over the chest wall or over ribs  Abdomen: Abdomen is soft, nontender, nondistended. There are no firm or pulsatile masses, no rebound rigidity or guarding.   Musculoskeletal: No deformity.  Neuro: Alert and oriented.  Cranial nerves II through XII are grossly intact.  There are no visual field deficits.  Cerebellar function intact with finger-to-nose and heel-to-shin testing.  Patient observed ambulating by myself and there is no evidence of ataxia or other gait abnormality.  Motor strength is intact in the face and strength is 5 out of 5 in all 4 extremities without any asymmetry.  Sensation to light touch is intact in all 4 extremities without any " asymmetry.  Dermatology: Skin is warm and dry  Psych: Mentation is grossly normal, cognition is grossly normal. Affect is appropriate.        DIAGNOSTIC RESULTS     EKG: All EKGs are interpreted by the Emergency Department Physician who either signs or Co-signs this chart in the absence of a cardiologist.    ECG 12 Lead ED Triage Standing Order; Weak / Dizzy / AMS   Preliminary Result   Test Reason : ED Triage Standing Order~   Blood Pressure :   */*   mmHG   Vent. Rate :  62 BPM     Atrial Rate :  62 BPM      P-R Int : 160 ms          QRS Dur :  80 ms       QT Int : 436 ms       P-R-T Axes :  96  47  46 degrees      QTc Int : 442 ms      Sinus rhythm with occasional premature ventricular complexes   Minimal voltage criteria for LVH, may be normal variant   Nonspecific ST abnormality   Abnormal ECG   No previous ECGs available      Referred By: ED MD           Confirmed By:             RADIOLOGY:   [x] Radiologist's Report Reviewed:  XR Chest 1 View   Final Result   Impression:   No acute cardiopulmonary findings.          Electronically Signed: Ian Mendieta     3/19/2023 10:36 AM EDT     Workstation ID: [id number]          I ordered and independently reviewed the above noted radiographic studies.        LABS:    I have reviewed and interpreted all of the currently available lab results from this visit (if applicable):  Results for orders placed or performed during the hospital encounter of 03/19/23   COVID-19 and FLU A/B PCR - Swab, Nasopharynx    Specimen: Nasopharynx; Swab   Result Value Ref Range    COVID19 Not Detected Not Detected - Ref. Range    Influenza A PCR Not Detected Not Detected    Influenza B PCR Not Detected Not Detected   Comprehensive Metabolic Panel    Specimen: Blood   Result Value Ref Range    Glucose 111 (H) 65 - 99 mg/dL    BUN 13 8 - 23 mg/dL    Creatinine 0.93 0.57 - 1.00 mg/dL    Sodium 143 136 - 145 mmol/L    Potassium 4.0 3.5 - 5.2 mmol/L    Chloride 106 98 - 107 mmol/L    CO2 28.0 22.0  - 29.0 mmol/L    Calcium 10.9 (H) 8.6 - 10.5 mg/dL    Total Protein 6.7 6.0 - 8.5 g/dL    Albumin 3.7 3.5 - 5.2 g/dL    ALT (SGPT) 5 1 - 33 U/L    AST (SGOT) 15 1 - 32 U/L    Alkaline Phosphatase 91 39 - 117 U/L    Total Bilirubin 0.4 0.0 - 1.2 mg/dL    Globulin 3.0 gm/dL    A/G Ratio 1.2 g/dL    BUN/Creatinine Ratio 14.0 7.0 - 25.0    Anion Gap 9.0 5.0 - 15.0 mmol/L    eGFR 61.5 >60.0 mL/min/1.73   Single High Sensitivity Troponin T    Specimen: Blood   Result Value Ref Range    HS Troponin T 16 (H) <10 ng/L   Magnesium    Specimen: Blood   Result Value Ref Range    Magnesium 1.9 1.6 - 2.4 mg/dL   Urinalysis With Microscopic If Indicated (No Culture) - Straight Cath    Specimen: Straight Cath; Urine   Result Value Ref Range    Color, UA Yellow Yellow, Straw    Appearance, UA Cloudy (A) Clear    pH, UA 6.5 5.0 - 8.0    Specific Gravity, UA 1.014 1.001 - 1.030    Glucose, UA Negative Negative    Ketones, UA Negative Negative    Bilirubin, UA Negative Negative    Blood, UA Negative Negative    Protein, UA 30 mg/dL (1+) (A) Negative    Leuk Esterase, UA Small (1+) (A) Negative    Nitrite, UA Positive (A) Negative    Urobilinogen, UA 0.2 E.U./dL 0.2 - 1.0 E.U./dL   CBC Auto Differential    Specimen: Blood   Result Value Ref Range    WBC 12.68 (H) 3.40 - 10.80 10*3/mm3    RBC 4.33 3.77 - 5.28 10*6/mm3    Hemoglobin 12.3 12.0 - 15.9 g/dL    Hematocrit 39.9 34.0 - 46.6 %    MCV 92.1 79.0 - 97.0 fL    MCH 28.4 26.6 - 33.0 pg    MCHC 30.8 (L) 31.5 - 35.7 g/dL    RDW 15.4 12.3 - 15.4 %    RDW-SD 52.4 37.0 - 54.0 fl    MPV 9.8 6.0 - 12.0 fL    Platelets 245 140 - 450 10*3/mm3    Neutrophil % 83.7 (H) 42.7 - 76.0 %    Lymphocyte % 8.9 (L) 19.6 - 45.3 %    Monocyte % 5.7 5.0 - 12.0 %    Eosinophil % 0.9 0.3 - 6.2 %    Basophil % 0.2 0.0 - 1.5 %    Immature Grans % 0.6 (H) 0.0 - 0.5 %    Neutrophils, Absolute 10.63 (H) 1.70 - 7.00 10*3/mm3    Lymphocytes, Absolute 1.13 0.70 - 3.10 10*3/mm3    Monocytes, Absolute 0.72 0.10 - 0.90  10*3/mm3    Eosinophils, Absolute 0.11 0.00 - 0.40 10*3/mm3    Basophils, Absolute 0.02 0.00 - 0.20 10*3/mm3    Immature Grans, Absolute 0.07 (H) 0.00 - 0.05 10*3/mm3    nRBC 0.0 0.0 - 0.2 /100 WBC   Urinalysis, Microscopic Only - Straight Cath    Specimen: Straight Cath; Urine   Result Value Ref Range    RBC, UA 0-2 None Seen, 0-2 /HPF    WBC, UA 31-50 (A) None Seen, 0-2 /HPF    Bacteria, UA 4+ (A) None Seen, Trace /HPF    Squamous Epithelial Cells, UA None Seen None Seen, 0-2 /HPF    Hyaline Casts, UA 13-20 0 - 6 /LPF    Methodology Automated Microscopy    ECG 12 Lead ED Triage Standing Order; Weak / Dizzy / AMS   Result Value Ref Range    QT Interval 436 ms    QTC Interval 442 ms        If labs were ordered, I independently reviewed the results and considered them in treating the patient.      EMERGENCY DEPARTMENT COURSE and DIFFERENTIAL DIAGNOSIS/MDM:   Vitals:  AS OF 10:42 EDT    BP - 168/93  HR - 66  TEMP - 97.9 °F (36.6 °C) (Oral)  O2 SATS - 96%        Discussion below represents my analysis of pertinent findings related to patient's condition, differential diagnosis, treatment plan and final disposition.      Differential diagnosis:  The differential diagnosis associated with the patient's presentation includes: CVA, intracranial hemorrhage, electrolyte derangement, pneumonia, urinary tract infection, COVID-19 infection      Independent interpretations (ECG/rhythm strip/X-ray/US/CT scan): I independently interpreted the patient's cardiac monitoring which demonstrates sinus rhythm without dysrhythmia.  I dependently interpreted the patient's chest x-ray which shows no focal pulmonary infiltrate.      Additional sources:  Discussed/obtained information from independent historians:   [] Spouse:   [] Parent:   [] Friend:   [x] EMS: Report taken from EMS.  Call out for weakness.  Vital signs stable during transport.   [x] Other: Additional history obtained from patient daughter at bedside with details as noted in  the HPI.    External (non-ED) record review:   [] Inpatient record:   [] Office record:   [] Outpatient record:   [x] Prior Outpatient labs: Reviewed prior urine culture results from 2022 which demonstrated E. coli with pan sensitivity.   [] Prior Outpatient radiology:   [] Primary Care record:   [] Outside ED record:   [] Other:       Patient's care impacted by:   [] Diabetes   [x] Hypertension   [] Coronary Artery Disease   [] Cancer   [x] Other: Dementia    Care significantly affected by Social Determinants of Health (housing and economic circumstances, unemployment)    [] Yes     [x] No   If yes, Patient's care significantly limited by  Social Determinants of Health including:    [] Inadequate housing    [] Low income    [] Alcoholism and drug addiction in family    [] Problems related to primary support group    [] Unemployment    [] Problems related to employment    [] Other Social Determinants of Health:       Consideration of admission/observation vs discharge: I did consider admission/observation for this patient, however she is very well-appearing with no specific complaints and has normal vital signs and does not appear to be systemically ill or septic at this time and based on my history physical examination appears to have no other underlying pathology.  Given this, I feel that she is appropriate for management of her urinary tract infection at home.      I considered prescription management with:    [] Pain medication:   [] Antiviral:   [x] Antibiotic: Patient given IV Rocephin in the ED and prescribed Omnicef for home [] Other:        ED Course:    ED Course as of 03/19/23 1042   Sun Mar 19, 2023   1036 The patient presents with findings of acute cystitis.  Less likely pyelonephritis.  She does have some mild leukocytosis but vital signs are normal and she has no abdominal pain, flank pain, or CVA tenderness on exam.  Her daughter is at bedside and relates that patient has history of urinary tract  infections and typically has weakness associated with this.  Per report from the daughter, the patient is at her cognitive baseline with mild dementia.  The patient is appropriate with me and is answering questions appropriately.  She is following all commands and has no focal neurologic deficit.  There is no suggestion of CVA.  She also has no headache or recent trauma to suggest intracranial hemorrhage.  Her labs are otherwise reassuring with no significant electrolyte derangement or other abnormality.  Her chest x-ray demonstrates no pneumonia.  Her COVID and flu swabs are negative.  She does have some mild hypercalcemia that would not be causing the symptoms.  I informed the family of this and the need to follow-up with her primary care provider regarding this abnormal lab value.  I we will provide the patient with a dose of Rocephin and some IV fluids and prescribe Omnicef for home. [NS]      ED Course User Index  [NS] Jon Soliz MD             I had a discussion with the patient/family regarding diagnosis, diagnostic results, treatment plan, and medications.  The patient/family indicated understanding of these instructions.  I spent adequate time at the bedside preceding discharge necessary to personally discuss the aftercare instructions, giving patient education, providing explanations of the results of our evaluations/findings, and my decision making to assure that the patient/family understand the plan of care.  Time was allotted to answer questions at that time and throughout the ED course.  Emphasis was placed on timely follow-up after discharge.  I also discussed the potential for the development of an acute emergent condition requiring further evaluation, admission, or even surgical intervention. I discussed that we found nothing during the visit today indicating the need for further workup, admission, or the presence of an unstable medical condition.  I encouraged the patient to return to the  emergency department immediately for ANY concerns, worsening, new complaints, or if symptoms persist and unable to seek follow-up in a timely fashion.  The patient/family expressed understanding and agreement with this plan.  The patient will follow-up with their PCP in 1-2 days for reevaluation.           PROCEDURES:  Procedures    CRITICAL CARE TIME        FINAL IMPRESSION      1. Acute cystitis without hematuria    2. Generalized weakness    3. History of dementia    4. Serum calcium elevated          DISPOSITION/PLAN     ED Disposition     ED Disposition   Discharge    Condition   Stable    Comment   --               Comment: Please note this report has been produced using speech recognition software.      Jon Soliz MD  Attending Emergency Physician           Jon Soliz MD  03/19/23 1047

## 2023-03-19 NOTE — DISCHARGE INSTRUCTIONS
Your blood work showed a mildly elevated calcium level.  Please follow-up with your primary care provider regarding this value.

## 2023-03-21 LAB — BACTERIA SPEC AEROBE CULT: ABNORMAL

## 2023-03-22 LAB
QT INTERVAL: 436 MS
QTC INTERVAL: 442 MS

## 2023-03-27 ENCOUNTER — TELEPHONE (OUTPATIENT)
Dept: FAMILY MEDICINE CLINIC | Facility: CLINIC | Age: 83
End: 2023-03-27
Payer: MEDICARE

## 2023-03-27 DIAGNOSIS — H91.90 HEARING LOSS, UNSPECIFIED HEARING LOSS TYPE, UNSPECIFIED LATERALITY: Primary | ICD-10-CM

## 2023-03-27 NOTE — TELEPHONE ENCOUNTER
Caller: Ann Moore    Relationship: Emergency Contact    Best call back number: [phone]    What is the medical concern/diagnosis: A REFERRAL AUDIOLOGY     What specialty or service is being requested: HEARING TEST     What is the provider, practice or medical service name: [facility]    What is the office location: [facility]    What is the office phone number: [phone]    Any additional details: DAUGHTER IS WANTING HER MOTHER TO BE SEEN AT [facility] FOR A HEARING TEST .    PLEASER CALL DAUGHTER WITH THE APPROVAL FOR HER REQUEST FOR THE REFERRAL .

## 2023-04-24 ENCOUNTER — PATIENT MESSAGE (OUTPATIENT)
Dept: NEUROLOGY | Facility: CLINIC | Age: 83
End: 2023-04-24
Payer: MEDICARE

## 2023-04-25 ENCOUNTER — TELEPHONE (OUTPATIENT)
Dept: NEUROLOGY | Facility: CLINIC | Age: 83
End: 2023-04-25
Payer: MEDICARE

## 2023-04-25 RX ORDER — DONEPEZIL HYDROCHLORIDE 10 MG/1
10 TABLET, FILM COATED ORAL DAILY
Qty: 90 TABLET | Refills: 3 | Status: SHIPPED | OUTPATIENT
Start: 2023-04-25 | End: 2024-04-24

## 2023-04-25 NOTE — TELEPHONE ENCOUNTER
Rx Refill Note  Requested Prescriptions      No prescriptions requested or ordered in this encounter      Last filled: Donepezil 8/25/22 90 with 3 refills.  Last office visit with prescribing clinician: 3/13/2023      Next office visit with prescribing clinician: 9/18/2023     Sent in 90 with 3 refills.    Johanny Miranda MA  04/25/23, 07:57 EDT

## 2023-04-25 NOTE — TELEPHONE ENCOUNTER
From: Maya Anand  To: Reagan Corral  Sent: 2023 5:49 PM EDT  Subject: Donepezil (Aricept) Rx    Hi Reagan,  My mother has run out of the Donepezil 10Mg Tabs that Anjelica Jai originally prescribed for her. Can/would you renew her prescription through [facility]? Her  is 1940. Please let me know if you need further information from me. Thank you. With kind regards, Ann Moore [phone]

## 2023-05-02 ENCOUNTER — TELEPHONE (OUTPATIENT)
Dept: FAMILY MEDICINE CLINIC | Facility: CLINIC | Age: 83
End: 2023-05-02
Payer: MEDICARE

## 2023-05-02 NOTE — TELEPHONE ENCOUNTER
Provider: AZAM GUERRA MD    Caller: TAMIE SORIANO    Relationship to Patient: DAUGHTER    Phone Number: [phone]    Reason for Call: PATIENT'S  DAUGHTER CHECKING ON THE STATUS OF AUDIOLOGY REFERRAL FOR HER PARENTS.  PATIENTS NEED TO BE SEEN FOR HEARING AIDS .       Ear, Nose [facility] Throat    OFFICE NUMBER: [phone]  FAX NUMBER:  [phone]

## 2023-05-22 ENCOUNTER — PATIENT MESSAGE (OUTPATIENT)
Dept: FAMILY MEDICINE CLINIC | Facility: CLINIC | Age: 83
End: 2023-05-22
Payer: MEDICARE
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
## 2023-05-22 DIAGNOSIS — M62.81 ABNORMAL GAIT DUE TO MUSCLE WEAKNESS: ICD-10-CM

## 2023-05-22 DIAGNOSIS — M25.562 ACUTE PAIN OF LEFT KNEE: ICD-10-CM

## 2023-05-22 DIAGNOSIS — Z91.81 AT HIGH RISK FOR FALLS: Primary | ICD-10-CM

## 2023-05-22 DIAGNOSIS — R26.9 ABNORMAL GAIT DUE TO MUSCLE WEAKNESS: ICD-10-CM

## 2023-06-01 ENCOUNTER — OFFICE VISIT (OUTPATIENT)
Dept: CARDIOLOGY | Facility: CLINIC | Age: 83
End: 2023-06-01

## 2023-06-01 VITALS
OXYGEN SATURATION: 98 % | HEIGHT: 62 IN | WEIGHT: 135 LBS | SYSTOLIC BLOOD PRESSURE: 124 MMHG | BODY MASS INDEX: 24.84 KG/M2 | DIASTOLIC BLOOD PRESSURE: 66 MMHG | HEART RATE: 61 BPM

## 2023-06-01 DIAGNOSIS — I49.1 PREMATURE ATRIAL COMPLEXES: Primary | ICD-10-CM

## 2023-06-01 DIAGNOSIS — I10 ESSENTIAL HYPERTENSION: Chronic | ICD-10-CM

## 2023-06-01 PROCEDURE — 99213 OFFICE O/P EST LOW 20 MIN: CPT | Performed by: INTERNAL MEDICINE

## 2023-06-01 PROCEDURE — 3074F SYST BP LT 130 MM HG: CPT | Performed by: INTERNAL MEDICINE

## 2023-06-01 PROCEDURE — 3078F DIAST BP <80 MM HG: CPT | Performed by: INTERNAL MEDICINE

## 2023-06-01 NOTE — PROGRESS NOTES
[facility] Cardiology  Office visit  Maya Anand  1940  [phone]  There is no work phone number on file.    VISIT DATE:  6/1/2023    PCP: Ysabel Abdalla MD  [address] [facility] [address]    CC:  Chief Complaint   Patient presents with   • Premature atrial complexes       Previous cardiac studies and procedures:  August 2022  TTE  • Left ventricular ejection fraction appears to be 56 - 60%. Left ventricular systolic function is normal.  • Mild tricuspid valve regurgitation is present.  14-day monitor: Frequent PACs, 19%.    ASSESSMENT:   Diagnosis Plan   1. Premature atrial complexes        2. Essential hypertension            PLAN:  Premature atrial contractions, frequent: No significant underlying structural or functional heart disease.  Continue low-dose Toprol.  We will continue to trend for the development of more sustained atrial arrhythmias such as atrial fibrillation.     Hypertension: Goal less than 140/90 mmHg.  Continue current medical therapy.  Reasonable to allow for intermittent permissive hypertension to limit risk of falls.    Subjective  Interval assessment: Stable functional capacity.  Denies chest pain, palpitations or dyspnea.  Wheelchair-bound for prolonged ambulation.  Denies orthostasis.  Compliant with medical therapy.    Initial evaluation: 82-year-old female with history of hypertension, frequent premature atrial contractions, and developing dementia.  Previously noted to have an irregular heartbeat during routine follow-up.  Patient denies palpitations, chest pain or dyspnea.  Per family she has fairly limited mobility.  Developing cognitive decline.  Follow-up ambulatory ECG monitor revealed frequent PACs but no atrial fibrillation or flutter.  Transthoracic echo revealed essentially normal myocardial structure and function.  Family ports that she has had a couple falls getting in and out of bed.  Slightly unstable on her feet.   "Family helps her to be compliant with medical therapy.    PHYSICAL EXAMINATION:  Vitals:    06/01/23 1133   BP: 124/66   BP Location: Right arm   Patient Position: Sitting   Pulse: 61   SpO2: 98%   Weight: 61.2 kg (135 lb)   Height: 157.5 cm (62\")     General Appearance:    Alert, cooperative, no distress, appears stated age   Head:    Normocephalic, without obvious abnormality, atraumatic   Eyes:    conjunctiva/corneas clear   Nose:   Nares normal, septum midline, mucosa normal, no drainage   Throat:   Lips, teeth and gums normal   Neck:   Supple, symmetrical, trachea midline, no carotid    bruit or JVD   Lungs:     Clear to auscultation bilaterally, respirations unlabored   Chest Wall:    No tenderness or deformity    Heart:    Regular rate and rhythm, S1 and S2 normal, no murmur, rub   or gallop, normal carotid impulse bilaterally without bruit.   Abdomen:     Soft, non-tender   Extremities:   Extremities normal, atraumatic, no cyanosis or edema   Pulses:   2+ and symmetric all extremities   Skin:   Skin color, texture, turgor normal, no rashes or lesions       Diagnostic Data:  Procedures  Lab Results   Component Value Date    CHLPL 199 06/02/2015    TRIG 110 06/02/2015    HDL 63 (H) 06/02/2015     Lab Results   Component Value Date    GLUCOSE 111 (H) 03/19/2023    BUN 13 03/19/2023    CREATININE 0.93 03/19/2023     03/19/2023    K 4.0 03/19/2023     03/19/2023    CO2 28.0 03/19/2023     No results found for: HGBA1C  Lab Results   Component Value Date    WBC 12.68 (H) 03/19/2023    HGB 12.3 03/19/2023    HCT 39.9 03/19/2023     03/19/2023       Allergies  Allergies   Allergen Reactions   • Erythromycin Unknown - Low Severity     unknown   • Penicillins        Current Medications    Current Outpatient Medications:   •  amLODIPine (NORVASC) 2.5 MG tablet, Take 1 tablet by mouth Daily., Disp: 90 tablet, Rfl: 3  •  Apoaequorin (Prevagen) 10 MG capsule, Take 20 mg by mouth Daily., Disp: , Rfl:   •  " AZO-CRANBERRY PO, Take  by mouth Daily., Disp: , Rfl:   •  cinacalcet (Sensipar) 30 MG tablet, Take 1 tablet by mouth Daily., Disp: 90 tablet, Rfl: 1  •  cyanocobalamin (VITAMIN B-12) 500 MCG tablet, Take 2 tablets by mouth Daily., Disp: , Rfl:   •  D-Mannose 500 MG capsule, Take  by mouth 3 (Three) Times a Day., Disp: , Rfl:   •  donepezil (Aricept) 10 MG tablet, Take 1 tablet by mouth Daily., Disp: 90 tablet, Rfl: 3  •  escitalopram (LEXAPRO) 10 MG tablet, Take 1 tablet by mouth Every Morning., Disp: 90 tablet, Rfl: 3  •  Lactobacillus-Inulin (PROBIOTIC DIGESTIVE SUPPORT PO), Take  by mouth Daily., Disp: , Rfl:   •  levothyroxine (SYNTHROID, LEVOTHROID) 75 MCG tablet, TAKE 1 TABLET EVERY MORNING, Disp: 90 tablet, Rfl: 3  •  memantine (NAMENDA) 10 MG tablet, Take 1 tablet by mouth 2 (Two) Times a Day., Disp: 30 tablet, Rfl: 11  •  metoprolol succinate XL (TOPROL-XL) 25 MG 24 hr tablet, Take 0.5 tablets by mouth Every Night., Disp: 45 tablet, Rfl: 3  •  Mirabegron ER (Myrbetriq) 50 MG tablet sustained-release 24 hour 24 hr tablet, Take 50 mg by mouth Daily., Disp: 90 tablet, Rfl: 3  •  Multiple Vitamins-Minerals (PRESERVISION AREDS 2 PO), Take  by mouth Daily., Disp: , Rfl:   •  ondansetron ODT (ZOFRAN-ODT) 4 MG disintegrating tablet, Place 1 tablet on the tongue Every 6 (Six) Hours As Needed for Nausea or Vomiting., Disp: 12 tablet, Rfl: 0  •  valACYclovir (Valtrex) 1000 MG tablet, Take 2 tablets by mouth 2 (Two) Times a Day. For 1 day per episode, Disp: 8 tablet, Rfl: 11          ROS  ROS      SOCIAL HX  Social History     Socioeconomic History   • Marital status:      Spouse name: Janie   • Number of children: 2   Tobacco Use   • Smoking status: Former     Packs/day: 0.25     Years: 5.00     Pack years: 1.25     Types: Cigarettes     Quit date:      Years since quittin.4   • Smokeless tobacco: Never   Vaping Use   • Vaping Use: Never used   Substance and Sexual Activity   • Alcohol use: No   •  Drug use: Never   • Sexual activity: Defer       FAMILY HX  Family History   Problem Relation Age of Onset   • Stroke Father              Chemo Lemon III, MD, FACC

## 2023-07-26 ENCOUNTER — LAB (OUTPATIENT)
Dept: LAB | Facility: HOSPITAL | Age: 83
End: 2023-07-26
Payer: MEDICARE

## 2023-07-26 DIAGNOSIS — E21.0 PRIMARY HYPERPARATHYROIDISM: ICD-10-CM

## 2023-07-26 LAB
ALBUMIN SERPL-MCNC: 3.7 G/DL (ref 3.5–5.2)
ANION GAP SERPL CALCULATED.3IONS-SCNC: 12 MMOL/L (ref 5–15)
BUN SERPL-MCNC: 18 MG/DL (ref 8–23)
BUN/CREAT SERPL: 15.7 (ref 7–25)
CA-I BLD-MCNC: 5 MG/DL (ref 4.6–5.4)
CA-I SERPL ISE-MCNC: 1.26 MMOL/L (ref 1.15–1.35)
CALCIUM SPEC-SCNC: 9.2 MG/DL (ref 8.6–10.5)
CHLORIDE SERPL-SCNC: 107 MMOL/L (ref 98–107)
CO2 SERPL-SCNC: 25 MMOL/L (ref 22–29)
CREAT SERPL-MCNC: 1.15 MG/DL (ref 0.57–1)
EGFRCR SERPLBLD CKD-EPI 2021: 47.4 ML/MIN/1.73
GLUCOSE SERPL-MCNC: 110 MG/DL (ref 65–99)
PHOSPHATE SERPL-MCNC: 3.4 MG/DL (ref 2.5–4.5)
POTASSIUM SERPL-SCNC: 3.7 MMOL/L (ref 3.5–5.2)
PTH-INTACT SERPL-MCNC: 105 PG/ML (ref 15–65)
SODIUM SERPL-SCNC: 144 MMOL/L (ref 136–145)

## 2023-07-26 PROCEDURE — 80069 RENAL FUNCTION PANEL: CPT

## 2023-07-26 PROCEDURE — 82330 ASSAY OF CALCIUM: CPT

## 2023-07-26 PROCEDURE — 83970 ASSAY OF PARATHORMONE: CPT

## 2023-07-26 PROCEDURE — 36415 COLL VENOUS BLD VENIPUNCTURE: CPT

## 2023-08-29 DIAGNOSIS — I49.1 PAC (PREMATURE ATRIAL CONTRACTION): ICD-10-CM

## 2023-08-29 DIAGNOSIS — I10 ESSENTIAL HYPERTENSION: ICD-10-CM

## 2023-08-29 RX ORDER — METOPROLOL SUCCINATE 25 MG/1
TABLET, EXTENDED RELEASE ORAL
Qty: 45 TABLET | Refills: 3 | Status: SHIPPED | OUTPATIENT
Start: 2023-08-29

## 2023-09-05 ENCOUNTER — PATIENT MESSAGE (OUTPATIENT)
Dept: NEUROLOGY | Facility: CLINIC | Age: 83
End: 2023-09-05
Payer: MEDICARE

## 2023-09-06 RX ORDER — MEMANTINE HYDROCHLORIDE 10 MG/1
10 TABLET ORAL 2 TIMES DAILY
Qty: 30 TABLET | Refills: 11 | Status: SHIPPED | OUTPATIENT
Start: 2023-09-06 | End: 2024-09-05

## 2023-09-06 NOTE — TELEPHONE ENCOUNTER
Rx Refill Note  Requested Prescriptions      No prescriptions requested or ordered in this encounter      Last filled: 8/15/22 30 with 11 refills.  Last office visit with prescribing clinician: 3/13/2023      Next office visit with prescribing clinician: 9/18/2023     Sent in 30 with 11 refills.    Johanny Miranda MA  09/06/23, 11:01 EDT

## 2023-09-14 ENCOUNTER — TELEPHONE (OUTPATIENT)
Dept: NEUROLOGY | Facility: CLINIC | Age: 83
End: 2023-09-14
Payer: MEDICARE

## 2023-09-14 NOTE — TELEPHONE ENCOUNTER
Provider: ARPITA    Caller:   JULIO C    Pharmacy: [facility]    Phone Number: [phone]     Reason for Call:  JULIO C WITH [facility] CALLED AND IS NEEDING CLARIFICATION ON PT MEMANTINE THEY RECEIVED.     REF# [id number]    THANK YOU

## 2023-09-18 ENCOUNTER — OFFICE VISIT (OUTPATIENT)
Dept: NEUROLOGY | Facility: CLINIC | Age: 83
End: 2023-09-18
Payer: MEDICARE

## 2023-09-18 VITALS
HEART RATE: 64 BPM | WEIGHT: 126.8 LBS | BODY MASS INDEX: 23.34 KG/M2 | DIASTOLIC BLOOD PRESSURE: 70 MMHG | OXYGEN SATURATION: 99 % | SYSTOLIC BLOOD PRESSURE: 124 MMHG | HEIGHT: 62 IN

## 2023-09-18 DIAGNOSIS — G30.9 ALZHEIMER'S DISEASE: Primary | ICD-10-CM

## 2023-09-18 DIAGNOSIS — R26.9 GAIT DISORDER: ICD-10-CM

## 2023-09-18 DIAGNOSIS — R63.4 WEIGHT LOSS: ICD-10-CM

## 2023-09-18 DIAGNOSIS — F02.80 ALZHEIMER'S DISEASE: Primary | ICD-10-CM

## 2023-09-18 PROCEDURE — 1160F RVW MEDS BY RX/DR IN RCRD: CPT | Performed by: NURSE PRACTITIONER

## 2023-09-18 PROCEDURE — 3078F DIAST BP <80 MM HG: CPT | Performed by: NURSE PRACTITIONER

## 2023-09-18 PROCEDURE — 3074F SYST BP LT 130 MM HG: CPT | Performed by: NURSE PRACTITIONER

## 2023-09-18 PROCEDURE — 99214 OFFICE O/P EST MOD 30 MIN: CPT | Performed by: NURSE PRACTITIONER

## 2023-09-18 PROCEDURE — 1159F MED LIST DOCD IN RCRD: CPT | Performed by: NURSE PRACTITIONER

## 2023-09-18 RX ORDER — MV-MN/C/THEANINE/HERB NO.310 1000-200MG
POWDER IN PACKET (EA) ORAL
COMMUNITY

## 2023-09-18 RX ORDER — DOXYCYCLINE HYCLATE 100 MG/1
100 CAPSULE ORAL DAILY
Qty: 30 CAPSULE | Refills: 1 | COMMUNITY
Start: 2023-09-07 | End: 2023-11-06

## 2023-09-18 NOTE — PROGRESS NOTES
Neuro Office Visit      Encounter Date: 2023   Patient Name: Maya Anand  : 1940   MRN: [id number]   PCP: Dr Abdalla  Chief Complaint:    Chief Complaint   Patient presents with    Alzheimer's Disease       History of Present Illness: Maya Anand is a 83 y.o. female who is here today in Neurology w her  and daughter for  alzheimer's disease.      Last visit 3/13/023 w me-cont aricept and namenda, Add abilify 5mg. Cont to use walker, BSC, shower chair to prevent falls.    Seen by Dr Lemon-PAC, nml echo. Cont torprol.  Seen by  audiology-severe hearing loss      Alzheimer's Disease  Family did not start Abilify. Did not feel it was needed. Behavior is improved.  Previously family noted change in behavior with defiance and refusal of care for  PT/OT. Pt does not want anyone in the house. Using rolling walker at home.     MMSE today 23/30. Disoriented to date and day, cannot recall 3words  Meds:aricept, namenda 10 bid, Prevagen  Med management: . Will miss days  Appetite: decreased-has lost 10 pounds in 6 months.  Sleep:dreaming and acting out dreams  ADLs:can feed herself but need assistance with bathing and dressing  Gait/Falls:no falls since last visit.  Language:no change  Dysphagia:no  Driving:not driving  Hallucinations:none  Behavior:arguementative  Living situation:lives with   Finances:  POA:Both daughters        PH  Onset  w forgetfulness, word finding difficulty w assoc anxiety and depression.  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 per Dr Reese  Subjective      Past Medical History:   Past Medical History:   Diagnosis Date    Acid reflux     Anxiety     Chronic cerebral ischemia     Depression     Frequent UTI     JOSE (generalized anxiety disorder)     Herpes  simplex     HTN (hypertension)     Hypothyroidism     Memory loss     Ovarian cyst     Peripheral edema        Past Surgical History:   Past Surgical History:   Procedure Laterality Date    COLONOSCOPY W/ BIOPSIES  2016    OVARIAN CYST REMOVAL      TOTAL KNEE ARTHROPLASTY Bilateral ,        Family History:   Family History   Problem Relation Age of Onset    Stroke Father        Social History:   Social History     Socioeconomic History    Marital status:      Spouse name: Janie    Number of children: 2   Tobacco Use    Smoking status: Former     Packs/day: 0.25     Years: 5.00     Pack years: 1.25     Types: Cigarettes     Quit date:      Years since quittin.7    Smokeless tobacco: Never   Vaping Use    Vaping Use: Never used   Substance and Sexual Activity    Alcohol use: No    Drug use: Never    Sexual activity: Defer       Medications:     Current Outpatient Medications:     amLODIPine (NORVASC) 2.5 MG tablet, Take 1 tablet by mouth Daily., Disp: 90 tablet, Rfl: 3    Apoaequorin (Prevagen) 10 MG capsule, Take 20 mg by mouth Daily., Disp: , Rfl:     AZO-CRANBERRY PO, Take  by mouth Daily., Disp: , Rfl:     cinacalcet (Sensipar) 30 MG tablet, Take 1 tablet by mouth 2 (Two) Times a Day., Disp: 180 tablet, Rfl: 1    Cobalamin Combinations (Neuriva Plus) capsule, Take  by mouth., Disp: , Rfl:     cyanocobalamin (VITAMIN B-12) 500 MCG tablet, Take 2 tablets by mouth Daily., Disp: , Rfl:     D-Mannose 500 MG capsule, Take  by mouth 3 (Three) Times a Day., Disp: , Rfl:     donepezil (Aricept) 10 MG tablet, Take 1 tablet by mouth Daily., Disp: 90 tablet, Rfl: 3    doxycycline (VIBRAMYCIN) 100 MG capsule, Take 1 capsule by mouth Daily., Disp: 30 capsule, Rfl: 1    escitalopram (LEXAPRO) 10 MG tablet, Take 1 tablet by mouth Every Morning., Disp: 90 tablet, Rfl: 3    Lactobacillus-Inulin (PROBIOTIC DIGESTIVE SUPPORT PO), Take  by mouth Daily., Disp: , Rfl:     levothyroxine (SYNTHROID, LEVOTHROID) 75  MCG tablet, TAKE 1 TABLET EVERY MORNING, Disp: 90 tablet, Rfl: 3    memantine (NAMENDA) 10 MG tablet, Take 1 tablet by mouth 2 (Two) Times a Day., Disp: 30 tablet, Rfl: 11    metoprolol succinate XL (TOPROL-XL) 25 MG 24 hr tablet, TAKE ONE-HALF (1/2) TABLET EVERY NIGHT, Disp: 45 tablet, Rfl: 3    Mirabegron ER (Myrbetriq) 50 MG tablet sustained-release 24 hour 24 hr tablet, Take 50 mg by mouth Daily., Disp: 90 tablet, Rfl: 3    Multiple Vitamins-Minerals (PRESERVISION AREDS 2 PO), Take  by mouth Daily., Disp: , Rfl:     ondansetron ODT (ZOFRAN-ODT) 4 MG disintegrating tablet, Place 1 tablet on the tongue Every 6 (Six) Hours As Needed for Nausea or Vomiting., Disp: 12 tablet, Rfl: 0    valACYclovir (Valtrex) 1000 MG tablet, Take 2 tablets by mouth 2 (Two) Times a Day. For 1 day per episode, Disp: 8 tablet, Rfl: 11    Allergies:   Allergies   Allergen Reactions    Erythromycin Unknown - Low Severity     unknown    Penicillins        PHQ-9 Total Score:     STEADI Fall Risk Assessment was completed, and patient is at MODERATE risk for falls. Assessment completed on:9/18/2023    Objective     Physical Exam:   Physical Exam  Neurological:      Deep Tendon Reflexes:      Reflex Scores:       Tricep reflexes are 2+ on the right side and 2+ on the left side.       Bicep reflexes are 2+ on the right side and 2+ on the left side.       Brachioradialis reflexes are 2+ on the right side and 2+ on the left side.       Patellar reflexes are 2+ on the right side and 2+ on the left side.       Achilles reflexes are 2+ on the right side and 2+ on the left side.  Psychiatric:         Speech: Speech normal.       Neurologic Exam     Mental Status   Disoriented to person.   Disoriented to place.   Disoriented to time.   Attention: normal. Concentration: normal.   Speech: speech is normal   Level of consciousness: alert  Knowledge: consistent with education.     Cranial Nerves     CN III, IV, VI   Upgaze: normal  Downgaze:  "normal  Conjugate gaze: present    CN VII   Facial expression full, symmetric.     CN VIII   Hearing: impaired    Motor Exam   Muscle bulk: normal  Overall muscle tone: normal    Strength   Right biceps: 5/5  Left biceps: 5/5  Right triceps: 5/5  Left triceps: 5/5  Right interossei: 5/5  Left interossei: 5/5  Right quadriceps: 5/5  Left quadriceps: 5/5  Right anterior tibial: 5/5  Left anterior tibial: 5/5  Right posterior tibial: 5/5  Left posterior tibial: 5/5    Sensory Exam   Light touch normal.     Gait, Coordination, and Reflexes     Gait  Gait: shuffling    Tremor   Resting tremor: absent  Action tremor: absent    Reflexes   Right brachioradialis: 2+  Left brachioradialis: 2+  Right biceps: 2+  Left biceps: 2+  Right triceps: 2+  Left triceps: 2+  Right patellar: 2+  Left patellar: 2+  Right achilles: 2+  Left achilles: 2+  Right : 2+  Left : 2+Right leg w slight shuffle. Steady w rollator.      Vital Signs:   Vitals:    09/18/23 1028   BP: 124/70   Pulse: 64   SpO2: 99%   Weight: 57.5 kg (126 lb 12.8 oz)   Height: 157.5 cm (62.01\")     Body mass index is 23.19 kg/m².       Assessment / Plan      Assessment/Plan:   Diagnoses and all orders for this visit:    1. Alzheimer's disease (Primary)  Comments:  Cont donepezila and namenda.    2. Gait disorder  Comments:  Cont to use rollator and safety measures in shower w assist.    3. Weight loss  Comments:  Increase protein in diet.           Patient Education:     Reviewed medications, potential side effects and signs and symptoms to report. Discussed risk versus benefits of treatment plan with patient and/or family-including medications, labs and radiology that may be ordered. Addressed questions and concerns during visit. Patient and/or family verbalized understanding and agree with plan. Instructed to call the office with any questions and report to ER with any life-threatening symptoms.     Follow Up:   Return in about 6 months (around 3/18/2024) for " Recheck.    During this visit the following were done:  Labs Reviewed []    Labs Ordered []    Radiology Reports Reviewed []    Radiology Ordered []    PCP Records Reviewed [x]    Referring Provider Records Reviewed [x]    ER Records Reviewed []    Hospital Records Reviewed []    History Obtained From Family [x]    Radiology Images Reviewed []    Other Reviewed []    Records Requested []      Reagan Corral, DNP, APRN

## 2023-09-25 ENCOUNTER — PATIENT MESSAGE (OUTPATIENT)
Age: 83
End: 2023-09-25
Payer: MEDICARE

## 2023-10-10 RX ORDER — AMLODIPINE BESYLATE 2.5 MG/1
2.5 TABLET ORAL DAILY
Qty: 90 TABLET | Refills: 1 | Status: SHIPPED | OUTPATIENT
Start: 2023-10-10

## 2023-10-16 DIAGNOSIS — E03.9 ACQUIRED HYPOTHYROIDISM: ICD-10-CM

## 2023-10-16 RX ORDER — LEVOTHYROXINE SODIUM 0.07 MG/1
TABLET ORAL
Qty: 90 TABLET | Refills: 0 | Status: SHIPPED | OUTPATIENT
Start: 2023-10-16

## 2023-10-16 NOTE — TELEPHONE ENCOUNTER
Rx Refill Note  Requested Prescriptions     Signed Prescriptions Disp Refills    levothyroxine (SYNTHROID, LEVOTHROID) 75 MCG tablet 90 tablet 0     Sig: TAKE 1 TABLET EVERY MORNING     Authorizing Provider: YSABEL GUERRA      Last office visit with prescribing clinician: 12/8/2022   Last telemedicine visit with prescribing clinician: Visit date not found   Next office visit with prescribing clinician: Visit date not found                         Would you like a call back once the refill request has been completed: [] Yes [] No    If the office needs to give you a call back, can they leave a voicemail: [] Yes [] No    Arely Khalil MA  10/16/23, 14:47 EDT    Ysabel Guerra MD         10/16/23 11:20 AM  Note      Please call patient or family member to schedule annual wellness visit with me due 12/9/2023.  1 refill provided.      Called and left vm for a return call    OK FOR [facility]UB TO RELAY MESSAGE AND SCHEDULE ANNUAL EXAM

## 2023-10-16 NOTE — TELEPHONE ENCOUNTER
Please call patient or family member to schedule annual wellness visit with me due 12/9/2023.  1 refill provided.

## 2023-10-16 NOTE — TELEPHONE ENCOUNTER
Rx Refill Note  Requested Prescriptions     Pending Prescriptions Disp Refills    levothyroxine (SYNTHROID, LEVOTHROID) 75 MCG tablet [Pharmacy Med Name: L-THYROXINE (SYNTHROID) TABS 75MCG] 90 tablet 3     Sig: TAKE 1 TABLET EVERY MORNING      Last office visit with prescribing clinician: 12/8/2022   Last telemedicine visit with prescribing clinician: Visit date not found   Next office visit with prescribing clinician: Visit date not found                         Would you like a call back once the refill request has been completed: [] Yes [] No    If the office needs to give you a call back, can they leave a voicemail: [] Yes [] No    April BRUCE Saavedra  10/16/23, 09:33 EDT

## 2023-10-17 NOTE — TELEPHONE ENCOUNTER
Please call patient or family member to schedule annual wellness visit with me due 12/9/2023.  1 refill provided.      Called and left vm for a return call     OK FOR [facility]UB TO RELAY MESSAGE AND SCHEDULE ANNUAL EXAM     HUB PROVIDED THE RELAY MESSAGE FROM THE OFFICE    PATIENT HAS NO FURTHER QUESTIONS

## 2023-12-05 ENCOUNTER — PATIENT MESSAGE (OUTPATIENT)
Age: 83
End: 2023-12-05
Payer: MEDICARE

## 2023-12-05 DIAGNOSIS — E03.9 ACQUIRED HYPOTHYROIDISM: ICD-10-CM

## 2023-12-05 DIAGNOSIS — Z13.0 SCREENING FOR DEFICIENCY ANEMIA: ICD-10-CM

## 2023-12-05 DIAGNOSIS — N18.30 STAGE 3 CHRONIC KIDNEY DISEASE, UNSPECIFIED WHETHER STAGE 3A OR 3B CKD: ICD-10-CM

## 2023-12-05 DIAGNOSIS — I10 ESSENTIAL HYPERTENSION: Primary | Chronic | ICD-10-CM

## 2023-12-08 ENCOUNTER — LAB (OUTPATIENT)
Dept: LAB | Facility: HOSPITAL | Age: 83
End: 2023-12-08
Payer: MEDICARE

## 2023-12-08 DIAGNOSIS — N18.30 STAGE 3 CHRONIC KIDNEY DISEASE, UNSPECIFIED WHETHER STAGE 3A OR 3B CKD: ICD-10-CM

## 2023-12-08 DIAGNOSIS — Z13.0 SCREENING FOR DEFICIENCY ANEMIA: ICD-10-CM

## 2023-12-08 DIAGNOSIS — I10 ESSENTIAL HYPERTENSION: Chronic | ICD-10-CM

## 2023-12-08 DIAGNOSIS — E03.9 ACQUIRED HYPOTHYROIDISM: ICD-10-CM

## 2023-12-08 LAB
ALBUMIN SERPL-MCNC: 3.7 G/DL (ref 3.5–5.2)
ALBUMIN/GLOB SERPL: 1.4 G/DL
ALP SERPL-CCNC: 95 U/L (ref 39–117)
ALT SERPL W P-5'-P-CCNC: 9 U/L (ref 1–33)
ANION GAP SERPL CALCULATED.3IONS-SCNC: 9 MMOL/L (ref 5–15)
AST SERPL-CCNC: 17 U/L (ref 1–32)
BILIRUB SERPL-MCNC: 0.4 MG/DL (ref 0–1.2)
BUN SERPL-MCNC: 15 MG/DL (ref 8–23)
BUN/CREAT SERPL: 11.9 (ref 7–25)
CALCIUM SPEC-SCNC: 9.2 MG/DL (ref 8.6–10.5)
CHLORIDE SERPL-SCNC: 106 MMOL/L (ref 98–107)
CHOLEST SERPL-MCNC: 220 MG/DL (ref 0–200)
CO2 SERPL-SCNC: 27 MMOL/L (ref 22–29)
CREAT SERPL-MCNC: 1.26 MG/DL (ref 0.57–1)
DEPRECATED RDW RBC AUTO: 44.1 FL (ref 37–54)
EGFRCR SERPLBLD CKD-EPI 2021: 42.4 ML/MIN/1.73
ERYTHROCYTE [DISTWIDTH] IN BLOOD BY AUTOMATED COUNT: 13.4 % (ref 12.3–15.4)
GLOBULIN UR ELPH-MCNC: 2.6 GM/DL
GLUCOSE SERPL-MCNC: 82 MG/DL (ref 65–99)
HCT VFR BLD AUTO: 34.5 % (ref 34–46.6)
HDLC SERPL-MCNC: 65 MG/DL (ref 40–60)
HGB BLD-MCNC: 11.1 G/DL (ref 12–15.9)
LDLC SERPL CALC-MCNC: 136 MG/DL (ref 0–100)
LDLC/HDLC SERPL: 2.06 {RATIO}
MCH RBC QN AUTO: 28.8 PG (ref 26.6–33)
MCHC RBC AUTO-ENTMCNC: 32.2 G/DL (ref 31.5–35.7)
MCV RBC AUTO: 89.6 FL (ref 79–97)
PLATELET # BLD AUTO: 254 10*3/MM3 (ref 140–450)
PMV BLD AUTO: 10.8 FL (ref 6–12)
POTASSIUM SERPL-SCNC: 4.2 MMOL/L (ref 3.5–5.2)
PROT SERPL-MCNC: 6.3 G/DL (ref 6–8.5)
RBC # BLD AUTO: 3.85 10*6/MM3 (ref 3.77–5.28)
SODIUM SERPL-SCNC: 142 MMOL/L (ref 136–145)
TRIGL SERPL-MCNC: 107 MG/DL (ref 0–150)
TSH SERPL DL<=0.05 MIU/L-ACNC: 2.63 UIU/ML (ref 0.27–4.2)
VLDLC SERPL-MCNC: 19 MG/DL (ref 5–40)
WBC NRBC COR # BLD AUTO: 6.23 10*3/MM3 (ref 3.4–10.8)

## 2023-12-08 PROCEDURE — 84443 ASSAY THYROID STIM HORMONE: CPT

## 2023-12-08 PROCEDURE — 36415 COLL VENOUS BLD VENIPUNCTURE: CPT

## 2023-12-08 PROCEDURE — 80053 COMPREHEN METABOLIC PANEL: CPT

## 2023-12-08 PROCEDURE — 85027 COMPLETE CBC AUTOMATED: CPT

## 2023-12-08 PROCEDURE — 80061 LIPID PANEL: CPT

## 2023-12-11 ENCOUNTER — OFFICE VISIT (OUTPATIENT)
Age: 83
End: 2023-12-11
Payer: MEDICARE

## 2023-12-11 VITALS
HEIGHT: 62 IN | OXYGEN SATURATION: 96 % | WEIGHT: 130.5 LBS | SYSTOLIC BLOOD PRESSURE: 118 MMHG | HEART RATE: 54 BPM | DIASTOLIC BLOOD PRESSURE: 64 MMHG | BODY MASS INDEX: 24.01 KG/M2

## 2023-12-11 DIAGNOSIS — B00.1 HERPES LABIALIS: ICD-10-CM

## 2023-12-11 DIAGNOSIS — Z91.81 AT MODERATE RISK FOR FALL: ICD-10-CM

## 2023-12-11 DIAGNOSIS — F32.5 MAJOR DEPRESSION IN REMISSION: ICD-10-CM

## 2023-12-11 DIAGNOSIS — N39.41 URGE INCONTINENCE OF URINE: ICD-10-CM

## 2023-12-11 DIAGNOSIS — E03.9 ACQUIRED HYPOTHYROIDISM: ICD-10-CM

## 2023-12-11 DIAGNOSIS — E78.00 PURE HYPERCHOLESTEROLEMIA: ICD-10-CM

## 2023-12-11 DIAGNOSIS — Z00.00 MEDICARE ANNUAL WELLNESS VISIT, SUBSEQUENT: Primary | ICD-10-CM

## 2023-12-11 DIAGNOSIS — H61.23 BILATERAL IMPACTED CERUMEN: ICD-10-CM

## 2023-12-11 DIAGNOSIS — N18.32 STAGE 3B CHRONIC KIDNEY DISEASE: ICD-10-CM

## 2023-12-11 DIAGNOSIS — F41.1 GAD (GENERALIZED ANXIETY DISORDER): ICD-10-CM

## 2023-12-11 PROCEDURE — 3078F DIAST BP <80 MM HG: CPT | Performed by: FAMILY MEDICINE

## 2023-12-11 PROCEDURE — 1170F FXNL STATUS ASSESSED: CPT | Performed by: FAMILY MEDICINE

## 2023-12-11 PROCEDURE — 1160F RVW MEDS BY RX/DR IN RCRD: CPT | Performed by: FAMILY MEDICINE

## 2023-12-11 PROCEDURE — 3074F SYST BP LT 130 MM HG: CPT | Performed by: FAMILY MEDICINE

## 2023-12-11 PROCEDURE — G0439 PPPS, SUBSEQ VISIT: HCPCS | Performed by: FAMILY MEDICINE

## 2023-12-11 PROCEDURE — 1159F MED LIST DOCD IN RCRD: CPT | Performed by: FAMILY MEDICINE

## 2023-12-11 RX ORDER — VALACYCLOVIR HYDROCHLORIDE 1 G/1
2000 TABLET, FILM COATED ORAL 2 TIMES DAILY
Qty: 8 TABLET | Refills: 11 | Status: SHIPPED | OUTPATIENT
Start: 2023-12-11

## 2023-12-11 RX ORDER — ESCITALOPRAM OXALATE 10 MG/1
10 TABLET ORAL EVERY MORNING
Qty: 90 TABLET | Refills: 3 | Status: SHIPPED | OUTPATIENT
Start: 2023-12-11

## 2023-12-11 RX ORDER — LEVOTHYROXINE SODIUM 0.07 MG/1
75 TABLET ORAL EVERY MORNING
Qty: 90 TABLET | Refills: 3 | Status: SHIPPED | OUTPATIENT
Start: 2023-12-11

## 2023-12-11 NOTE — PROGRESS NOTES
The ABCs of the Annual Wellness Visit  Subsequent Medicare Wellness Visit    Subjective    Maya Anand is a 83 y.o. female who presents for a Subsequent Medicare Wellness Visit.    The following portions of the patient's history were reviewed and   updated as appropriate: allergies, current medications, past family history, past medical history, past social history, past surgical history, and problem list.    Compared to one year ago, the patient feels her physical   health is better.    Compared to one year ago, the patient feels her mental   health is the same.    Recent Hospitalizations:  She was not admitted to the hospital during the last year.       Current Medical Providers:  Patient Care Team:  Ysabel Abdalla MD as PCP - General (Family Medicine)  Jabari Gallegos MD (Inactive) as Consulting Physician (Nephrology)  Chemo Lemon III, MD as Cardiologist (Cardiology)  Randolph Collado APRN as Nurse Practitioner (Cardiology)  Gabriella Reese DO as Consulting Physician (Endocrinology)    Outpatient Medications Prior to Visit   Medication Sig Dispense Refill    amLODIPine (NORVASC) 2.5 MG tablet TAKE 1 TABLET DAILY 90 tablet 1    Apoaequorin (Prevagen) 10 MG capsule Take 20 mg by mouth Daily.      AZO-CRANBERRY PO Take  by mouth Daily.      cinacalcet (Sensipar) 30 MG tablet Take 1 tablet by mouth 2 (Two) Times a Day. 180 tablet 1    Cobalamin Combinations (Neuriva Plus) capsule Take  by mouth.      cyanocobalamin (VITAMIN B-12) 500 MCG tablet Take 2 tablets by mouth Daily.      D-Mannose 500 MG capsule Take  by mouth 3 (Three) Times a Day.      donepezil (Aricept) 10 MG tablet Take 1 tablet by mouth Daily. 90 tablet 3    Lactobacillus-Inulin (PROBIOTIC DIGESTIVE SUPPORT PO) Take  by mouth Daily.      memantine (NAMENDA) 10 MG tablet Take 1 tablet by mouth 2 (Two) Times a Day. 30 tablet 11    metoprolol succinate XL (TOPROL-XL) 25 MG 24 hr tablet TAKE ONE-HALF (1/2) TABLET EVERY NIGHT 45  tablet 3    Multiple Vitamins-Minerals (PRESERVISION AREDS 2 PO) Take  by mouth Daily.      ondansetron ODT (ZOFRAN-ODT) 4 MG disintegrating tablet Place 1 tablet on the tongue Every 6 (Six) Hours As Needed for Nausea or Vomiting. 12 tablet 0    escitalopram (LEXAPRO) 10 MG tablet Take 1 tablet by mouth Every Morning. 90 tablet 3    levothyroxine (SYNTHROID, LEVOTHROID) 75 MCG tablet TAKE 1 TABLET EVERY MORNING 90 tablet 0    Mirabegron ER (Myrbetriq) 50 MG tablet sustained-release 24 hour 24 hr tablet Take 50 mg by mouth Daily. 90 tablet 3    valACYclovir (Valtrex) 1000 MG tablet Take 2 tablets by mouth 2 (Two) Times a Day. For 1 day per episode 8 tablet 11     No facility-administered medications prior to visit.       No opioid medication identified on active medication list. I have reviewed chart for other potential  high risk medication/s and harmful drug interactions in the elderly.        Aspirin is not on active medication list.  Aspirin use is not indicated based on review of current medical condition/s. Risk of harm outweighs potential benefits.  .    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  ACP discussion was held with the patient during this visit. Patient has an advance directive (not in EMR), copy requested. Finishing the paperwork today.       Objective    Vitals:    23 1013   BP: 118/64   Pulse: 54   SpO2: 96%   Weight: 59.2 kg (130 lb 8 oz)   Height: 157.5 cm (62.01\")   PainSc: 0-No pain     Estimated body mass index is 23.86 kg/m² as calculated from the following:    Height as of this encounter: 157.5 cm (62.01\").    Weight as of this encounter: 59.2 kg (130 lb 8 oz).    BMI is within normal parameters. No other follow-up for BMI required.      Does the patient have evidence of cognitive impairment? Yes    Lab Results   Component Value Date    TRIG 107 2023    HDL 65 (H) 2023     (H) 2023    VLDL 19 2023        HEALTH RISK ASSESSMENT    Smoking Status:  Social History     Tobacco Use   Smoking Status Former    Packs/day: 0.25    Years: 5.00    Additional pack years: 0.00    Total pack years: 1.25    Types: Cigarettes    Quit date:     Years since quittin.9   Smokeless Tobacco Never     Alcohol Consumption:  Social History     Substance and Sexual Activity   Alcohol Use No     Fall Risk Screen:    HUMA Fall Risk Assessment was completed, and patient is at MODERATE risk for falls. Assessment completed on:2023    Depression Screenin/11/2023    10:18 AM   PHQ-2/PHQ-9 Depression Screening   Little Interest or Pleasure in Doing Things 0-->not at all   Feeling Down, Depressed or Hopeless 0-->not at all   PHQ-9: Brief Depression Severity Measure Score 0       Health Habits and Functional and Cognitive Screenin/11/2023    10:00 AM   Functional & Cognitive Status   Do you have difficulty preparing food and eating? Yes   Do you have difficulty bathing yourself, getting dressed or grooming yourself? Yes   Do you have difficulty using the toilet? No   Do you have difficulty " moving around from place to place? Yes   Do you have trouble with steps or getting out of a bed or a chair? Yes   Current Diet Well Balanced Diet   Dental Exam Up to date   Eye Exam Up to date   Exercise (times per week) 0 times per week   Current Exercises Include No Regular Exercise   Do you need help using the phone?  Yes   Are you deaf or do you have serious difficulty hearing?  Yes   Do you need help to go to places out of walking distance? Yes   Do you need help shopping? Yes   Do you need help preparing meals?  Yes   Do you need help with housework?  Yes   Do you need help with laundry? Yes   Do you need help taking your medications? Yes   Do you need help managing money? Yes   Do you ever drive or ride in a car without wearing a seat belt? No   Have you felt unusual stress, anger or loneliness in the last month? No   Who do you live with? Spouse   If you need help, do you have trouble finding someone available to you? No   Have you been bothered in the last four weeks by sexual problems? No   Do you have difficulty concentrating, remembering or making decisions? Yes       Age-appropriate Screening Schedule:  Refer to the list below for future screening recommendations based on patient's age, sex and/or medical conditions. Orders for these recommended tests are listed in the plan section. The patient has been provided with a written plan.    Health Maintenance   Topic Date Due    DXA SCAN  Never done    COLORECTAL CANCER SCREENING  Never done    ZOSTER VACCINE (1 of 2) Never done    LIPID PANEL  12/08/2024    ANNUAL WELLNESS VISIT  12/11/2024    TDAP/TD VACCINES (2 - Td or Tdap) 09/15/2027    COVID-19 Vaccine  Completed    INFLUENZA VACCINE  Completed    Pneumococcal Vaccine 65+  Completed                  CMS Preventative Services Quick Reference  Risk Factors Identified During Encounter  Hearing Problem:  follow-up with ENT for cerumen removal as well  The above risks/problems have been discussed with the  "patient.  Pertinent information has been shared with the patient in the After Visit Summary.  An After Visit Summary and PPPS were made available to the patient.    Follow Up:   Next Medicare Wellness visit to be scheduled in 1 year.       Additional E&M Note during same encounter follows:  Patient has multiple medical problems which are significant and separately identifiable that require additional work above and beyond the Medicare Wellness Visit.      Chief Complaint  Medicare Wellness-subsequent and Annual Exam    Subjective        HPI  Maya Anand is also being seen today for physical      Usually has fast food for lunch or breakfast. Small breakfast, large lunch and small dinner.     Alzheimer is progressing slowly per family.              Objective   Vital Signs:  /64   Pulse 54   Ht 157.5 cm (62.01\")   Wt 59.2 kg (130 lb 8 oz)   SpO2 96%   BMI 23.86 kg/m²     Physical Exam  Vitals reviewed.   Constitutional:       General: She is not in acute distress.     Appearance: She is not ill-appearing.      Comments: Seated in wheelchair   HENT:      Right Ear: There is impacted cerumen.      Left Ear: There is impacted cerumen.      Mouth/Throat:      Mouth: Mucous membranes are moist.      Pharynx: No oropharyngeal exudate or posterior oropharyngeal erythema.   Cardiovascular:      Rate and Rhythm: Normal rate and regular rhythm.   Pulmonary:      Effort: Pulmonary effort is normal.      Breath sounds: Normal breath sounds.   Abdominal:      Palpations: Abdomen is soft.      Tenderness: There is no abdominal tenderness.   Musculoskeletal:      Right lower leg: No edema.      Left lower leg: No edema.   Skin:     Findings: No rash.   Neurological:      Mental Status: She is alert.   Psychiatric:         Mood and Affect: Mood normal.         Behavior: Behavior is cooperative.          The following data was reviewed by: Ysabel Abdalla MD on 12/11/2023:  Common labs          6/26/2023    10:51 " 7/26/2023    15:22 12/8/2023    09:20   Common Labs   Glucose 84  110  82    BUN 18  18  15    Creatinine 1.18  1.15  1.26    Sodium 146  144  142    Potassium 3.9  3.7  4.2    Chloride 109  107  106    Calcium 9.9  9.2  9.2    Albumin 3.5  3.7  3.7    Total Bilirubin   0.4    Alkaline Phosphatase   95    AST (SGOT)   17    ALT (SGPT)   9    WBC   6.23    Hemoglobin   11.1    Hematocrit   34.5    Platelets   254    Total Cholesterol   220    Triglycerides   107    HDL Cholesterol   65    LDL Cholesterol    136      TSH          12/8/2023    09:20   TSH   TSH 2.630                 Assessment and Plan   Diagnoses and all orders for this visit:    1. Medicare annual wellness visit, subsequent (Primary)  Reviewed previsit labs with patient in detail.  Counseling/anticipatory guidance: Nutrition  Handout with iron rich foods due to mildly decreased hemoglobin.  2. At moderate risk for fall    3. Acquired hypothyroidism  -     levothyroxine (SYNTHROID, LEVOTHROID) 75 MCG tablet; Take 1 tablet by mouth Every Morning.  Dispense: 90 tablet; Refill: 3  Stable.  Continue levothyroxine.  4. JOSE (generalized anxiety disorder)  -     escitalopram (LEXAPRO) 10 MG tablet; Take 1 tablet by mouth Every Morning.  Dispense: 90 tablet; Refill: 3  Stable.  Continue Lexapro.  5. Major depression in remission  -     escitalopram (LEXAPRO) 10 MG tablet; Take 1 tablet by mouth Every Morning.  Dispense: 90 tablet; Refill: 3  Stable.  Continue Lexapro.  6. Herpes labialis  -     valACYclovir (Valtrex) 1000 MG tablet; Take 2 tablets by mouth 2 (Two) Times a Day. For 1 day per episode  Dispense: 8 tablet; Refill: 11  Stable.  Valtrex when needed.  7. Stage 3b chronic kidney disease  Stable.  8. Urge incontinence of urine  -     Mirabegron ER (Myrbetriq) 50 MG tablet sustained-release 24 hour 24 hr tablet; Take 50 mg by mouth Daily.  Dispense: 90 tablet; Refill: 3  Stable.  Continue Myrbetriq.  9. Pure hypercholesterolemia  Discussed nutrition  changes.  10. Bilateral impacted cerumen  Dried impacted cerumen recommend removal with ear nose and throat to also help improve hearing aid function.         Follow Up   Return in about 1 year (around 12/12/2024) for MWV and fasting labs.  Patient was given instructions and counseling regarding her condition or for health maintenance advice. Please see specific information pulled into the AVS if appropriate.       Electronically signed by Ysabel Abdalla MD, 12/11/23, 10:53 AM EST.

## 2023-12-11 NOTE — PATIENT INSTRUCTIONS
Advance Care Planning and Advance Directives     You make decisions on a daily basis - decisions about where you want to live, your career, your home, your life. Perhaps one of the most important decisions you face is your choice for future medical care. Take time to talk with your family and your healthcare team and start planning today.  Advance Care Planning is a process that can help you:  Understand possible future healthcare decisions in light of your own experiences  Reflect on those decision in light of your goals and values  Discuss your decisions with those closest to you and the healthcare professionals that care for you  Make a plan by creating a document that reflects your wishes    Surrogate Decision Maker  In the event of a medical emergency, which has left you unable to communicate or to make your own decisions, you would need someone to make decisions for you.  It is important to discuss your preferences for medical treatment with this person while you are in good health.     Qualities of a surrogate decision maker:  Willing to take on this role and responsibility  Knows what you want for future medical care  Willing to follow your wishes even if they don't agree with them  Able to make difficult medical decisions under stressful circumstances    Advance Directives  These are legal documents you can create that will guide your healthcare team and decision maker(s) when needed. These documents can be stored in the electronic medical record.    Living Will - a legal document to guide your care if you have a terminal condition or a serious illness and are unable to communicate. States vary by statute in document names/types, but most forms may include one or more of the following:        -  Directions regarding life-prolonging treatments        -  Directions regarding artificially provided nutrition/hydration        -  Choosing a healthcare decision maker        -  Direction regarding organ/tissue  donation    Durable Power of  for Healthcare - this document names an -in-fact to make medical decisions for you, but it may also allow this person to make personal and financial decisions for you. Please seek the advice of an  if you need this type of document.    **Advance Directives are not required and no one may discriminate against you if you do not sign one.    Medical Orders  Many states allow specific forms/orders signed by your physician to record your wishes for medical treatment in your current state of health. This form, signed in personal communication with your physician, addresses resuscitation and other medical interventions that you may or may not want.      For more information or to schedule a time with a [facility] Advance Care Planning Facilitator contact: [facility] or call [phone] and someone will contact you directly.  Fall Prevention in the Home, Adult  Falls can cause injuries and affect people of all ages. There are many simple things that you can do to make your home safe and to help prevent falls. Ask for help when making these changes, if needed.  What actions can I take to prevent falls?  General instructions  Use good lighting in all rooms. Replace any light bulbs that burn out, turn on lights if it is dark, and use night-lights.  Place frequently used items in easy-to-reach places. Lower the shelves around your home if necessary.  Set up furniture so that there are clear paths around it. Avoid moving your furniture around.  Remove throw rugs and other tripping hazards from the floor.  Avoid walking on wet floors.  Fix any uneven floor surfaces.  Add color or contrast paint or tape to grab bars and handrails in your home. Place contrasting color strips on the first and last steps of staircases.  When you use a stepladder, make sure that it is completely opened and that the sides and supports are firmly locked. Have someone hold the ladder  while you are using it. Do not climb a closed stepladder.  Know where your pets are when moving through your home.  What can I do in the bathroom?         Keep the floor dry. Immediately clean up any water that is on the floor.  Remove soap buildup in the tub or shower regularly.  Use nonskid mats or decals on the floor of the tub or shower.  Attach bath mats securely with double-sided, nonslip rug tape.  If you need to sit down while you are in the shower, use a plastic, nonslip stool.  Install grab bars by the toilet and in the tub and shower. Do not use towel bars as grab bars.  What can I do in the bedroom?  Make sure that a bedside light is easy to reach.  Do not use oversized bedding that reaches the floor.  Have a firm chair that has side arms to use for getting dressed.  What can I do in the kitchen?  Clean up any spills right away.  If you need to reach for something above you, use a sturdy step stool that has a grab bar.  Keep electrical cables out of the way.  Do not use floor polish or wax that makes floors slippery. If you must use wax, make sure that it is non-skid floor wax.  What can I do with my stairs?  Do not leave any items on the stairs.  Make sure that you have a light switch at the top and the bottom of the stairs. Have them installed if you do not have them.  Make sure that there are handrails on both sides of the stairs. Fix handrails that are broken or loose. Make sure that handrails are as long as the staircases.  Install non-slip stair treads on all stairs in your home.  Avoid having throw rugs at the top or bottom of stairs, or secure the rugs with carpet tape to prevent them from moving.  Choose a carpet design that does not hide the edge of steps on the stairs.  Check any carpeting to make sure that it is firmly attached to the stairs. Fix any carpet that is loose or worn.  What can I do on the outside of my home?  Use bright outdoor lighting.  Regularly repair the edges of walkways  and driveways and fix any cracks.  Remove high doorway thresholds.  Trim any shrubbery on the main path into your home.  Regularly check that handrails are securely fastened and in good repair. Both sides of all steps should have handrails.  Install guardrails along the edges of any raised decks or porches.  Clear walkways of debris and clutter, including tools and rocks.  Have leaves, snow, and ice cleared regularly.  Use sand or salt on walkways during winter months.  In the garage, clean up any spills right away, including grease or oil spills.  What other actions can I take?  Wear closed-toe shoes that fit well and support your feet. Wear shoes that have rubber soles or low heels.  Use mobility aids as needed, such as canes, walkers, scooters, and crutches.  Review your medicines with your health care provider. Some medicines can cause dizziness or changes in blood pressure, which increase your risk of falling.  Talk with your health care provider about other ways that you can decrease your risk of falls. This may include working with a physical therapist or  to improve your strength, balance, and endurance.  Where to find more information  Centers for Disease Control and Prevention, [place]: www.cdc.gov  [place]: www.janette.nih.gov  Contact a health care provider if:  You are afraid of falling at home.  You feel weak, drowsy, or dizzy at home.  You fall at home.  Summary  There are many simple things that you can do to make your home safe and to help prevent falls.  Ways to make your home safe include removing tripping hazards and installing grab bars in the bathroom.  Ask for help when making these changes in your home.  This information is not intended to replace advice given to you by your health care provider. Make sure you discuss any questions you have with your health care provider.  Document Revised: 09/19/2022 Document Reviewed: 07/21/2021  Elsevier Patient Education © 2023  Elsevier Inc.    Sit-to-Stand Exercise    The sit-to-stand exercise (also known as the chair stand or chair rise exercise) strengthens your lower body and helps you maintain or improve your mobility and independence. The end goal is to do the sit-to-stand exercise without using your hands. This will be easier as you become stronger. You should always talk with your health care provider before starting any exercise program, especially if you have had recent surgery.  Do the exercise exactly as told by your health care provider and adjust it as directed. It is normal to feel mild stretching, pulling, tightness, or discomfort as you do this exercise, but you should stop right away if you feel sudden pain or your pain gets worse. Do not begin doing this exercise until told by your health care provider.  What the sit-to-stand exercise does  The sit-to-stand exercise helps to strengthen the muscles in your thighs and the muscles in the center of your body that give you stability (core muscles). This exercise is especially helpful if:  You have had knee or hip surgery.  You have trouble getting up from a chair, out of a car, or off the toilet due to muscle weakness.  How to do the sit-to-stand exercise  Sit toward the front edge of a sturdy chair without armrests. Your knees should be bent and your feet should be flat on the floor and shoulder-width apart and underneath your hips.  Place your hands lightly on each side of the seat. Keep your back and neck as straight as possible, with your chest slightly forward.  Breathe in slowly. Lean forward and slightly shift your weight to the front of your feet.  Breathe out as you slowly stand up. Try not to support any weight with your hands.  Stand and pause for a full breath in and out.  Breathe in as you sit down slowly. Tighten your core and abdominal muscles to control your lowering as much as possible. You should lower yourself back to the chair slowly, not just drop back  into the seat.  Breathe out slowly.  Do this exercise 10-15 times. If needed, do it fewer times until you build up strength.  Rest for 1 minute, then do another set of 10-15 repetitions.  To change the difficulty of the sit-to-stand exercise  If the exercise is too difficult, use a chair with sturdy armrests, and push off the armrests to help you come to the standing position. You can also use the armrests to help slowly lower yourself back to sitting. As this gets easier, try to use your arms less. You can also place a firm cushion or pillow on the chair to make the surface higher.  If this exercise is too easy, do not use your arms to help raise or lower yourself. You can also wear a weighted vest, use hand weights, increase your repetitions, or try a lower chair.  General tips  You may feel tired when starting an exercise routine. This is normal.  You may have muscle soreness that lasts a few days. This is normal. As you get stronger, you may not feel muscle soreness.  Use smooth, steady movements.  Do not  hold your breath during strength exercises. This can cause unsafe changes in your blood pressure.  Breathe in slowly through your nose, and breathe out slowly through your mouth.  Summary  Strengthening your lower body is an important step to help you move safely and independently.  The sit-to-stand exercise helps strengthen the muscles in your thighs and core.  You should always talk with your health care provider before starting any exercise program, especially if you have had recent surgery.  This information is not intended to replace advice given to you by your health care provider. Make sure you discuss any questions you have with your health care provider.  Document Revised: 04/10/2022 Document Reviewed: 04/10/2022  Elsevier Patient Education © 2023 Elsevier Inc.    You are due for Shingrix vaccination series ( the newest shingles vaccine).  It is a two shot series spaced 2-6 months apart. Please get  this vaccine series started at your earliest convenience at your local pharmacy to help avoid shingles outbreak. It is more effective than the old Zostavax vaccine and is recommended even if you have had the Zostavax vaccine in the past.  Once the Shingrix series is completed, it does not need to be repeated.   For more information, please look at the website below:  [facility] Shingrix Vaccine Information      Medicare Wellness  Personal Prevention Plan of Service     Date of Office Visit:    Encounter Provider:  Ysabel Abdalla MD  Place of Service:  [facility]  Patient Name: Maya Anand  :  1940    As part of the Medicare Wellness portion of your visit today, we are providing you with this personalized preventive plan of services (PPPS). This plan is based upon recommendations of the [place] Preventive Services Task Force (USPSTF) and the Advisory Committee on Immunization Practices (ACIP).    This lists the preventive care services that should be considered, and provides dates of when you are due. Items listed as completed are up-to-date and do not require any further intervention.    Health Maintenance   Topic Date Due    DXA SCAN  Never done    COLORECTAL CANCER SCREENING  Never done    ZOSTER VACCINE (1 of 2) Never done    LIPID PANEL  2024    ANNUAL WELLNESS VISIT  2024    TDAP/TD VACCINES (2 - Td or Tdap) 09/15/2027    COVID-19 Vaccine  Completed    INFLUENZA VACCINE  Completed    Pneumococcal Vaccine 65+  Completed       No orders of the defined types were placed in this encounter.      Return in about 1 year (around 2024) for MWV and fasting labs.

## 2024-01-08 ENCOUNTER — OFFICE VISIT (OUTPATIENT)
Dept: ENDOCRINOLOGY | Facility: CLINIC | Age: 84
End: 2024-01-08
Payer: MEDICARE

## 2024-01-08 VITALS
WEIGHT: 134 LBS | HEIGHT: 62 IN | DIASTOLIC BLOOD PRESSURE: 64 MMHG | SYSTOLIC BLOOD PRESSURE: 116 MMHG | OXYGEN SATURATION: 98 % | HEART RATE: 59 BPM | BODY MASS INDEX: 24.66 KG/M2

## 2024-01-08 DIAGNOSIS — E21.0 PRIMARY HYPERPARATHYROIDISM: Primary | ICD-10-CM

## 2024-01-08 DIAGNOSIS — E04.1 SOLITARY THYROID NODULE: ICD-10-CM

## 2024-01-08 PROCEDURE — 3074F SYST BP LT 130 MM HG: CPT | Performed by: INTERNAL MEDICINE

## 2024-01-08 PROCEDURE — 3078F DIAST BP <80 MM HG: CPT | Performed by: INTERNAL MEDICINE

## 2024-01-08 PROCEDURE — 99213 OFFICE O/P EST LOW 20 MIN: CPT | Performed by: INTERNAL MEDICINE

## 2024-01-08 RX ORDER — CINACALCET 30 MG/1
30 TABLET, FILM COATED ORAL 2 TIMES DAILY
Qty: 180 TABLET | Refills: 3 | Status: SHIPPED | OUTPATIENT
Start: 2024-01-08 | End: 2024-01-11 | Stop reason: SDUPTHER

## 2024-01-08 NOTE — PROGRESS NOTES
"Chief Complaint   Patient presents with    Thyroid Problem     Primary hyperparathyroidism          HPI   Maya Anand is a 83 y.o. female had concerns including Thyroid Problem (Primary hyperparathyroidism/).      On sensipar 30 mg twice daily. Recent calcium level was normal.       The following portions of the patient's history were reviewed and updated as appropriate: allergies, current medications, past family history, past medical history, past social history, past surgical history, and problem list.    Review of Systems   Constitutional: Negative.         /64 (BP Location: Right arm, Patient Position: Sitting, Cuff Size: Adult)   Pulse 59   Ht 157.5 cm (62\")   Wt 60.8 kg (134 lb)   SpO2 98%   BMI 24.51 kg/m²      Physical Exam  Vitals reviewed.   Constitutional:       Appearance: Normal appearance.   Cardiovascular:      Rate and Rhythm: Normal rate.   Pulmonary:      Effort: Pulmonary effort is normal.   Neurological:      General: No focal deficit present.      Mental Status: She is alert. Mental status is at baseline.   Psychiatric:         Mood and Affect: Mood normal.         Behavior: Behavior normal.              LABS AND IMAGING   CMP  Lab Results   Component Value Date    GLUCOSE 82 12/08/2023    BUN 15 12/08/2023    CREATININE 1.26 (H) 12/08/2023    CREATININE 1.15 (H) 07/26/2023    CREATININE 1.18 (H) 06/26/2023    EGFR 42.4 (L) 12/08/2023    EGFR 47.4 (L) 07/26/2023    EGFR 46.2 (L) 06/26/2023    BCR 11.9 12/08/2023    K 4.2 12/08/2023    CO2 27.0 12/08/2023    CALCIUM 9.2 12/08/2023    CALCIUM 9.2 07/26/2023    CALCIUM 9.9 06/26/2023    CALCIUM 10.9 (H) 03/19/2023    CALCIUM 11.1 (H) 03/06/2023    ALBUMIN 3.7 12/08/2023    AST 17 12/08/2023    ALT 9 12/08/2023        CBC w/DIFF   Lab Results   Component Value Date    WBC 6.23 12/08/2023    RBC 3.85 12/08/2023    HGB 11.1 (L) 12/08/2023    HCT 34.5 12/08/2023    MCV 89.6 12/08/2023    MCH 28.8 12/08/2023    MCHC 32.2 12/08/2023    " RDW 13.4 12/08/2023    RDWSD 44.1 12/08/2023    MPV 10.8 12/08/2023     12/08/2023    NEUTRORELPCT 83.7 (H) 03/19/2023    LYMPHORELPCT 8.9 (L) 03/19/2023    MONORELPCT 5.7 03/19/2023    EOSRELPCT 0.9 03/19/2023    BASORELPCT 0.2 03/19/2023    AUTOIGPER 0.6 (H) 03/19/2023    NEUTROABS 10.63 (H) 03/19/2023    LYMPHSABS 1.13 03/19/2023    MONOSABS 0.72 03/19/2023    EOSABS 0.11 03/19/2023    BASOSABS 0.02 03/19/2023    AUTOIGNUM 0.07 (H) 03/19/2023    NRBC 0.0 03/19/2023     TSH  Lab Results   Component Value Date    TSH 2.630 12/08/2023    TSH 3.430 12/08/2022    TSH 2.580 08/18/2022     T4  Lab Results   Component Value Date    FREET4 1.35 08/18/2022    FREET4 1.55 05/07/2021    FREET4 1.20 09/06/2016     Lab Results   Component Value Date    Y5IIYFB 11.6 (H) 06/30/2014     Lab Results   Component Value Date    .0 (H) 07/26/2023    .0 (H) 06/26/2023    .0 (H) 03/06/2023    .0 (H) 12/08/2022    [id number] 53.1 03/06/2023     EXAMINATION: US THYROID-03/13/2018:     INDICATION: PAIN; E04.1-Nontoxic single thyroid nodule.      TECHNIQUE: Ultrasound of the thyroid and adjacent soft tissues of the  neck.     COMPARISON: NONE.     FINDINGS:  The thyroid isthmus measures 1 cm in thickness without  nodule.     The right thyroid lobe measures 5 x 2 x 2.4 cm demonstrating  heterogeneity of the parenchyma and containing a 1.4 cm isoechoic nodule  without evidence of microcalcifications or significant internal flow on  Doppler interrogation.     The left thyroid lobe measures 4.2 x 1.8 x 1.6 cm demonstrating  heterogeneity of the parenchyma, however, no dominant nodule of concern.     IMPRESSION:  1. Mildly heterogeneous thyroid gland with appropriate flow on Doppler  interrogation.  2. Dominant nodule right thyroid lobe measures 1.4 cm and is isoechoic  in signal without evidence of microcalcifications. Recommend followup  within 6 months to ensure stability.     D:  03/13/2018  E:  03/13/2018             This report was finalized on 3/13/2018 1:54 PM by Dr. Delgado Franklin.            EXAMINATION: NM PARATHYROID SCAN-12/08/2021:      INDICATION: E83.52/E21.0; E83.52-Hypercalcemia; E21.0-Primary  hyperparathyroidism.     TECHNIQUE: Radiopharmaceutical: 21.9 mCi of Technetium 99m Sestamibi,  IV.     COMPARISON: NONE.     FINDINGS: Images are obtained at 20 minutes and two hours postinjection.  The 20-minute images show diffuse thyroid gland uptake, with focus of  asymmetrically increased uptake in the right lower thyroid pole. Also  normal variant submandibular gland uptake.     Two hour delayed images show generally diminished activity in the  thyroid but persistent, stronger activity in the right lower thyroid  pole than elsewhere. Considerations include thyroid adenoma and  parathyroid adenoma.     IMPRESSION:  Persistent focus of asymmetrically increased activity in the  right lower thyroid pole as described.     D:  12/09/2021  E:  12/09/2021     This report was finalized on 12/9/2021 10:13 PM by Dr. Avinash Velez MD.    Assessment and Plan    Diagnoses and all orders for this visit:    1. Primary hyperparathyroidism (Primary)  Previous work-up consistent with primary hyperparathyroidism with PTH level 147, total calcium 11.0. Remote history of nephrolithiasis.   Patient is not a surgical candidate due to dementia, age, complications with anesthesia.  Family prefers conservative approach.  Well controlled on sensipar 30 mg twice daily. Increased in June 2023 for slightly high calcium level and recent total calcium from a month ago is normal.   Refill sent. Monitor yearly.   -     cinacalcet (Sensipar) 30 MG tablet; Take 1 tablet by mouth 2 (Two) Times a Day.  Dispense: 180 tablet; Refill: 3     2. Solitary thyroid nodule  Ultrasound report and images reviewed from 2018. Patient and family prefer noninvasive approaches to medical management. No ultrasound monitoring is necessary.        Return in about 1 year  (around 1/8/2025) for next scheduled follow up. The patient was instructed to contact the clinic with any interval questions or concerns.    Gabriella Reese, DO   Endocrinologist    Please note that portions of this note were completed with a voice recognition program.

## 2024-01-11 ENCOUNTER — TELEPHONE (OUTPATIENT)
Dept: ENDOCRINOLOGY | Facility: CLINIC | Age: 84
End: 2024-01-11
Payer: MEDICARE

## 2024-01-11 DIAGNOSIS — E21.0 PRIMARY HYPERPARATHYROIDISM: ICD-10-CM

## 2024-01-11 RX ORDER — CINACALCET 30 MG/1
30 TABLET, FILM COATED ORAL 2 TIMES DAILY
Qty: 180 TABLET | Refills: 3 | Status: SHIPPED | OUTPATIENT
Start: 2024-01-11

## 2024-01-11 NOTE — TELEPHONE ENCOUNTER
DAUGHTER CALLED TO REQUEST TO HAVE RX FOR CINACALET 30 MG SENT TO [facility] INSTEAD OF LOCAL PHARMACY.

## 2024-01-12 DIAGNOSIS — E21.0 PRIMARY HYPERPARATHYROIDISM: ICD-10-CM

## 2024-01-12 RX ORDER — CINACALCET 30 MG/1
30 TABLET, FILM COATED ORAL 2 TIMES DAILY
Qty: 180 TABLET | Refills: 3 | OUTPATIENT
Start: 2024-01-12

## 2024-03-01 ENCOUNTER — PRIOR AUTHORIZATION (OUTPATIENT)
Dept: ENDOCRINOLOGY | Facility: CLINIC | Age: 84
End: 2024-03-01
Payer: MEDICARE

## 2024-03-01 NOTE — TELEPHONE ENCOUNTER
KAROLINE ALVAREZ (Key: [id number])  PA Case ID #: [id number]  Need Help? Call us at [phone]  Outcome  Approved today  CaseId:[id number];Status:Approved;Review Type:Prior Auth;Coverage Start Date:01/31/2024;Coverage End Date:03/01/2025;  Authorization Expiration Date: 2/28/2025  Drug  Cinacalcet HCl 30MG tablets  ePA cloud logo  Form  [facility] Electronic PA Form (2017 [place])

## 2024-03-18 ENCOUNTER — OFFICE VISIT (OUTPATIENT)
Dept: NEUROLOGY | Facility: CLINIC | Age: 84
End: 2024-03-18
Payer: MEDICARE

## 2024-03-18 VITALS
HEIGHT: 62 IN | BODY MASS INDEX: 25.21 KG/M2 | DIASTOLIC BLOOD PRESSURE: 68 MMHG | HEART RATE: 56 BPM | SYSTOLIC BLOOD PRESSURE: 136 MMHG | WEIGHT: 137 LBS | OXYGEN SATURATION: 96 %

## 2024-03-18 DIAGNOSIS — G30.9 ALZHEIMER'S DISEASE: Primary | ICD-10-CM

## 2024-03-18 DIAGNOSIS — R26.9 GAIT DISORDER: ICD-10-CM

## 2024-03-18 DIAGNOSIS — E83.52 HYPERCALCEMIA: ICD-10-CM

## 2024-03-18 DIAGNOSIS — F02.80 ALZHEIMER'S DISEASE: Primary | ICD-10-CM

## 2024-03-18 NOTE — PROGRESS NOTES
Neuro Office Visit      Encounter Date: 2024   Patient Name: Maya Anand  : 1940   MRN: [id number]   PCP: Ysabel Abdalla  Chief Complaint:    Chief Complaint   Patient presents with    Alzheimer's Disease       History of Present Illness: Maya Anand is a 83 y.o. female who is here today in Neurology for  alzheimer's disease, gait disorder and weight loss      Last visit 2023 w me-cont donepezil and namenda. Use rollator and other safety measures in shower with assist to prevent injury. Inc protein in diet    Interval history-seen by ENT for cerumen impaction    Alzheimer's Disease  MMSE today   Family reports a major improvement in her mental status after starting Sensipar. Pt is more alert and less agitated. Memory and comprehension is improved. Gait improved. No falls. Using rollator.  PH        Family did not start Abilify. Did not feel it was needed. Behavior is improved.  Previously family noted change in behavior with [facility] and refusal of care for  PT/OT. Pt does not want anyone in the house. Using rolling walker at home.     MMSE last visit .   MMSE today     Disoriented to date and day, cannot recall 3words  Meds:aricept, namenda 10 bid, Prevagen  Med management: . Will miss days  Appetite: decreased-has lost 10 pounds in 6 months.  Sleep:dreaming and acting out dreams  ADLs:can feed herself but need assistance with bathing and dressing  Gait/Falls:no falls since last visit.  Language:no change  Dysphagia:no  Driving:not driving  Hallucinations:none  Behavior:arguementative  Living situation:lives with   Finances:  POA:Both daughters        PH  Onset  w forgetfulness, word finding difficulty w assoc anxiety and depression.  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 per Dr Reese  Subjective      Past Medical History:   Past Medical History:   Diagnosis Date    Acid reflux     Anxiety     Chronic cerebral ischemia     Depression     Frequent UTI     JOSE (generalized anxiety disorder)     Herpes simplex     HTN (hypertension)     Hypothyroidism     Memory loss     Ovarian cyst     Peripheral edema        Past Surgical History:   Past Surgical History:   Procedure Laterality Date    COLONOSCOPY W/ BIOPSIES  2016    OVARIAN CYST REMOVAL      TOTAL KNEE ARTHROPLASTY Bilateral ,        Family History:   Family History   Problem Relation Age of Onset    Stroke Father        Social History:   Social History     Socioeconomic History    Marital status:      Spouse name: Janie    Number of children: 2   Tobacco Use    Smoking status: Former     Current packs/day: 0.00     Average packs/day: 0.3 packs/day for 5.0 years (1.3 ttl pk-yrs)     Types: Cigarettes     Start date:      Quit date:      Years since quittin.2    Smokeless tobacco: Never   Vaping Use    Vaping status: Never Used   Substance and Sexual Activity    Alcohol use: No    Drug use: Never    Sexual activity: Defer       Medications:     Current Outpatient Medications:     amLODIPine (NORVASC) 2.5 MG tablet, TAKE 1 TABLET DAILY, Disp: 90 tablet, Rfl: 1    Apoaequorin (Prevagen) 10 MG capsule, Take 20 mg by mouth Daily., Disp: , Rfl:     AZO-CRANBERRY PO, Take  by mouth Daily., Disp: , Rfl:     cinacalcet (Sensipar) 30 MG tablet, Take 1 tablet by mouth 2 (Two) Times a Day., Disp: 180 tablet, Rfl: 3    Cobalamin Combinations (Neuriva Plus) capsule, Take  by mouth., Disp: , Rfl:     cyanocobalamin (VITAMIN B-12) 500 MCG tablet, Take 2 tablets by mouth Daily., Disp: , Rfl:     D-Mannose 500 MG capsule, Take  by mouth 3 (Three) Times a Day., Disp: , Rfl:     donepezil (Aricept) 10 MG tablet, Take 1 tablet by mouth Daily., Disp: 90 tablet, Rfl: 3    escitalopram (LEXAPRO) 10 MG tablet, Take  1 tablet by mouth Every Morning., Disp: 90 tablet, Rfl: 3    Lactobacillus-Inulin (PROBIOTIC DIGESTIVE SUPPORT PO), Take  by mouth Daily., Disp: , Rfl:     levothyroxine (SYNTHROID, LEVOTHROID) 75 MCG tablet, Take 1 tablet by mouth Every Morning., Disp: 90 tablet, Rfl: 3    memantine (NAMENDA) 10 MG tablet, Take 1 tablet by mouth 2 (Two) Times a Day., Disp: 30 tablet, Rfl: 11    metoprolol succinate XL (TOPROL-XL) 25 MG 24 hr tablet, TAKE ONE-HALF (1/2) TABLET EVERY NIGHT, Disp: 45 tablet, Rfl: 3    Mirabegron ER (Myrbetriq) 50 MG tablet sustained-release 24 hour 24 hr tablet, Take 50 mg by mouth Daily., Disp: 90 tablet, Rfl: 3    Multiple Vitamins-Minerals (PRESERVISION AREDS 2 PO), Take  by mouth Daily., Disp: , Rfl:     ondansetron ODT (ZOFRAN-ODT) 4 MG disintegrating tablet, Place 1 tablet on the tongue Every 6 (Six) Hours As Needed for Nausea or Vomiting., Disp: 12 tablet, Rfl: 0    valACYclovir (Valtrex) 1000 MG tablet, Take 2 tablets by mouth 2 (Two) Times a Day. For 1 day per episode, Disp: 8 tablet, Rfl: 11    Allergies:   Allergies   Allergen Reactions    Erythromycin Unknown - Low Severity     unknown    Penicillins        PHQ-9 Total Score:     STEADI Fall Risk Assessment was completed, and patient is at LOW risk for falls.Assessment completed on:3/18/2024    Objective     Physical Exam:   Physical Exam  Constitutional:       General: She is not in acute distress.     Appearance: Normal appearance. She is not ill-appearing or toxic-appearing.   HENT:      Head: Normocephalic and atraumatic.      Nose: Nose normal.   Eyes:      General:         Right eye: No discharge.         Left eye: No discharge.      Conjunctiva/sclera: Conjunctivae normal.   Pulmonary:      Effort: Pulmonary effort is normal. No respiratory distress.   Neurological:      General: No focal deficit present.      Mental Status: She is alert and oriented to person, place, and time. Mental status is at baseline.      Deep Tendon  "Reflexes: Reflexes normal.   Psychiatric:         Mood and Affect: Mood normal.         Behavior: Behavior normal.         Thought Content: Thought content normal.         Judgment: Judgment normal.         Neurologic Exam     Mental Status   Oriented to person, place, and time.   Oriented to person.   Oriented to place.   Registration: recalls 3 of 3 objects. Recall at 5 minutes: recalls 1 of 3 objects. Follows 3 step commands.   Attention: normal. Concentration: normal.   Speech: (Loud voice)  Level of consciousness: alert  Knowledge: good.   Normal comprehension.        Vital Signs:   Vitals:    03/18/24 1128   BP: 136/68   Pulse: 56   SpO2: 96%   Weight: 62.1 kg (137 lb)   Height: 157.5 cm (62.01\")     Body mass index is 25.05 kg/m².         Assessment / Plan      Assessment/Plan:   Diagnoses and all orders for this visit:    1. Alzheimer's disease (Primary)  Comments:  Cont donepezil and namenda    2. Hypercalcemia  Comments:  Per Endo. Mental status improved with normalized calcium    3. Gait disorder  Comments:  Cont to use rollator.           Patient Education:     Reviewed medications, potential side effects and signs and symptoms to report. Discussed risk versus benefits of treatment plan with patient and/or family-including medications, labs and radiology that may be ordered. Addressed questions and concerns during visit. Patient and/or family verbalized understanding and agree with plan. Instructed to call the office with any questions and report to ER with any life-threatening symptoms.     Follow Up:   Return in about 6 months (around 9/18/2024) for Recheck.    During this visit the following were done:  Labs Reviewed []    Labs Ordered []    Radiology Reports Reviewed []    Radiology Ordered []    PCP Records Reviewed []    Referring Provider Records Reviewed []    ER Records Reviewed []    Hospital Records Reviewed []    History Obtained From Family [x]    Radiology Images Reviewed []    Other " Reviewed [x]    Records Requested []      Reagan Corral, DNP, APRN

## 2024-04-03 RX ORDER — DONEPEZIL HYDROCHLORIDE 10 MG/1
10 TABLET, FILM COATED ORAL DAILY
Qty: 90 TABLET | Refills: 3 | Status: SHIPPED | OUTPATIENT
Start: 2024-04-03

## 2024-04-03 NOTE — TELEPHONE ENCOUNTER
Rx Refill Note  Requested Prescriptions     Pending Prescriptions Disp Refills    donepezil (ARICEPT) 10 MG tablet [Pharmacy Med Name: DONEPEZIL HCL TABS 10MG] 90 tablet 3     Sig: TAKE 1 TABLET DAILY      Last filled: 4/25/23 90 with 3 refills.  Last office visit with prescribing clinician: 3/18/2024      Next office visit with prescribing clinician: 9/23/2024     Sent in 90 with 3 refills.    Johanny Miranda MA  04/03/24, 08:00 EDT

## 2024-04-08 RX ORDER — AMLODIPINE BESYLATE 2.5 MG/1
2.5 TABLET ORAL DAILY
Qty: 90 TABLET | Refills: 0 | Status: SHIPPED | OUTPATIENT
Start: 2024-04-08

## 2024-05-20 PROCEDURE — 87086 URINE CULTURE/COLONY COUNT: CPT | Performed by: PHYSICIAN ASSISTANT

## 2024-06-03 ENCOUNTER — OFFICE VISIT (OUTPATIENT)
Dept: CARDIOLOGY | Facility: CLINIC | Age: 84
End: 2024-06-03
Payer: MEDICARE

## 2024-06-03 VITALS
OXYGEN SATURATION: 94 % | WEIGHT: 134.2 LBS | BODY MASS INDEX: 22.91 KG/M2 | HEART RATE: 60 BPM | DIASTOLIC BLOOD PRESSURE: 64 MMHG | SYSTOLIC BLOOD PRESSURE: 120 MMHG | HEIGHT: 64 IN

## 2024-06-03 DIAGNOSIS — I10 ESSENTIAL HYPERTENSION: Chronic | ICD-10-CM

## 2024-06-03 DIAGNOSIS — I49.1 PREMATURE ATRIAL COMPLEXES: ICD-10-CM

## 2024-06-03 DIAGNOSIS — E78.00 PURE HYPERCHOLESTEROLEMIA: Primary | ICD-10-CM

## 2024-06-03 PROCEDURE — 3074F SYST BP LT 130 MM HG: CPT | Performed by: INTERNAL MEDICINE

## 2024-06-03 PROCEDURE — 3078F DIAST BP <80 MM HG: CPT | Performed by: INTERNAL MEDICINE

## 2024-06-03 PROCEDURE — G2211 COMPLEX E/M VISIT ADD ON: HCPCS | Performed by: INTERNAL MEDICINE

## 2024-06-03 PROCEDURE — 99213 OFFICE O/P EST LOW 20 MIN: CPT | Performed by: INTERNAL MEDICINE

## 2024-06-03 NOTE — PROGRESS NOTES
[facility] Cardiology  Office visit  Maya Anand  1940  [phone]  There is no work phone number on file.    VISIT DATE:  6/3/2024    PCP: Ysabel Abdalla MD  [address] [place] [address] [place] [id number]    CC:  Chief Complaint   Patient presents with    Premature atrial complexes       Previous cardiac studies and procedures:  August 2022  TTE  Left ventricular ejection fraction appears to be 56 - 60%. Left ventricular systolic function is normal.  Mild tricuspid valve regurgitation is present.  14-day monitor: Frequent PACs, 19%.    ASSESSMENT:   Diagnosis Plan   1. Pure hypercholesterolemia        2. Premature atrial complexes        3. Essential hypertension            PLAN:  Premature atrial contractions, frequent: No significant underlying structural or functional heart disease.  Continue low-dose Toprol.  We will continue to trend for the development of more sustained atrial arrhythmias such as atrial fibrillation.     Hypertension: Goal less than 140/90 mmHg.  Continue current medical therapy.  Reasonable to allow for intermittent permissive hypertension to limit risk of falls.    Subjective  Interval assessment: Stable functional capacity.  Denies chest pain, palpitations or dyspnea.  Wheelchair-bound for prolonged ambulation.  Denies orthostasis.  Compliant with medical therapy.  2 mechanical falls without significant injury in May.  Has not been consistently keeping track of home blood pressures.    Initial evaluation: 82-year-old female with history of hypertension, frequent premature atrial contractions, and developing dementia.  Previously noted to have an irregular heartbeat during routine follow-up.  Patient denies palpitations, chest pain or dyspnea.  Per family she has fairly limited mobility.  Developing cognitive decline.  Follow-up ambulatory ECG monitor revealed frequent PACs but no atrial fibrillation or flutter.  Transthoracic echo revealed  "essentially normal myocardial structure and function.  Family ports that she has had a couple falls getting in and out of bed.  Slightly unstable on her feet.  Family helps her to be compliant with medical therapy.    PHYSICAL EXAMINATION:  Vitals:    06/03/24 1054   BP: 120/64   BP Location: Left arm   Patient Position: Sitting   Pulse: 60   SpO2: 94%   Weight: 60.9 kg (134 lb 3.2 oz)   Height: 162.6 cm (64\")     General Appearance:    Alert, cooperative, no distress, appears stated age   Head:    Normocephalic, without obvious abnormality, atraumatic   Eyes:    conjunctiva/corneas clear   Nose:   Nares normal, septum midline, mucosa normal, no drainage   Throat:   Lips, teeth and gums normal   Neck:   Supple, symmetrical, trachea midline, no carotid    bruit or JVD   Lungs:     Clear to auscultation bilaterally, respirations unlabored   Chest Wall:    No tenderness or deformity    Heart:    Regular rate and rhythm, S1 and S2 normal, no murmur, rub   or gallop, normal carotid impulse bilaterally without bruit.   Abdomen:     Soft, non-tender   Extremities:   Extremities normal, atraumatic, no cyanosis or edema   Pulses:   2+ and symmetric all extremities   Skin:   Skin color, texture, turgor normal, no rashes or lesions       Diagnostic Data:  Procedures  Lab Results   Component Value Date    CHLPL 199 06/02/2015    TRIG 107 12/08/2023    HDL 65 (H) 12/08/2023     Lab Results   Component Value Date    GLUCOSE 82 12/08/2023    BUN 15 12/08/2023    CREATININE 1.26 (H) 12/08/2023     12/08/2023    K 4.2 12/08/2023     12/08/2023    CO2 27.0 12/08/2023     No results found for: \"HGBA1C\"  Lab Results   Component Value Date    WBC 6.23 12/08/2023    HGB 11.1 (L) 12/08/2023    HCT 34.5 12/08/2023     12/08/2023       Allergies  Allergies   Allergen Reactions    Erythromycin Unknown - Low Severity     unknown    Penicillins        Current Medications    Current Outpatient Medications:     amLODIPine " (NORVASC) 2.5 MG tablet, TAKE 1 TABLET DAILY, Disp: 90 tablet, Rfl: 0    Apoaequorin (Prevagen) 10 MG capsule, Take 20 mg by mouth Daily., Disp: , Rfl:     AZO-CRANBERRY PO, Take  by mouth Daily., Disp: , Rfl:     cinacalcet (Sensipar) 30 MG tablet, Take 1 tablet by mouth 2 (Two) Times a Day., Disp: 180 tablet, Rfl: 3    Cobalamin Combinations (Neuriva Plus) capsule, Take  by mouth., Disp: , Rfl:     cyanocobalamin (VITAMIN B-12) 500 MCG tablet, Take 2 tablets by mouth Daily., Disp: , Rfl:     D-Mannose 500 MG capsule, Take  by mouth 3 (Three) Times a Day., Disp: , Rfl:     donepezil (ARICEPT) 10 MG tablet, TAKE 1 TABLET DAILY, Disp: 90 tablet, Rfl: 3    escitalopram (LEXAPRO) 10 MG tablet, Take 1 tablet by mouth Every Morning., Disp: 90 tablet, Rfl: 3    Lactobacillus-Inulin (PROBIOTIC DIGESTIVE SUPPORT PO), Take  by mouth Daily., Disp: , Rfl:     levothyroxine (SYNTHROID, LEVOTHROID) 75 MCG tablet, Take 1 tablet by mouth Every Morning., Disp: 90 tablet, Rfl: 3    memantine (NAMENDA) 10 MG tablet, Take 1 tablet by mouth 2 (Two) Times a Day., Disp: 30 tablet, Rfl: 11    metoprolol succinate XL (TOPROL-XL) 25 MG 24 hr tablet, TAKE ONE-HALF (1/2) TABLET EVERY NIGHT, Disp: 45 tablet, Rfl: 3    Mirabegron ER (Myrbetriq) 50 MG tablet sustained-release 24 hour 24 hr tablet, Take 50 mg by mouth Daily., Disp: 90 tablet, Rfl: 3    Multiple Vitamins-Minerals (PRESERVISION AREDS 2 PO), Take  by mouth Daily., Disp: , Rfl:     valACYclovir (Valtrex) 1000 MG tablet, Take 2 tablets by mouth 2 (Two) Times a Day. For 1 day per episode, Disp: 8 tablet, Rfl: 11          ROS  ROS      SOCIAL HX  Social History     Socioeconomic History    Marital status:      Spouse name: Janie    Number of children: 2   Tobacco Use    Smoking status: Former     Current packs/day: 0.00     Average packs/day: 0.3 packs/day for 5.0 years (1.3 ttl pk-yrs)     Types: Cigarettes     Start date:      Quit date:      Years since quittin.4      Passive exposure: Past    Smokeless tobacco: Never   Vaping Use    Vaping status: Never Used   Substance and Sexual Activity    Alcohol use: No    Drug use: Never    Sexual activity: Defer       FAMILY HX  Family History   Problem Relation Age of Onset    Stroke Father              Chemo Lemon III, MD, FACC

## 2024-06-21 ENCOUNTER — TELEPHONE (OUTPATIENT)
Age: 84
End: 2024-06-21
Payer: MEDICARE

## 2024-06-21 DIAGNOSIS — N39.41 URGE INCONTINENCE OF URINE: ICD-10-CM

## 2024-06-21 RX ORDER — MIRABEGRON 50 MG/1
50 TABLET, EXTENDED RELEASE ORAL DAILY
Qty: 7 TABLET | Refills: 0 | Status: SHIPPED | OUTPATIENT
Start: 2024-06-21

## 2024-06-21 NOTE — TELEPHONE ENCOUNTER
RELAY  Called to make aware medication has been sent in for a week supply, no answer left a vm to return call

## 2024-06-21 NOTE — TELEPHONE ENCOUNTER
Caller: Indy Siddiqui    Relationship: Emergency Contact    Best call back number:       [phone] (Home)     Which medication are you concerned about:       Mirabegron ER (Myrbetriq) 50 MG tablet sustained-release 24 hour 24 hr tablet     Who prescribed you this medication:     DR GUERRA    What are your concerns:     CALLER STATED MEDICATION WILL ARRIVE IN APPROXIMATELY (1) WEEK FROM [facility]    CALLER REQUESTED A SHORT TERM MEDICATION REFILL BE PROVIDED FOR (7) DAYS FOR MEDICATION FROM LOCAL PHARMACY    [place], [place]    TELEPHONE CONTACT:    [phone]

## 2024-06-21 NOTE — TELEPHONE ENCOUNTER
Name: Indy Siddiqui    Relationship: Emergency Contact      HUB PROVIDED THE RELAY MESSAGE FROM THE OFFICE   PATIENT VOICED UNDERSTANDING AND HAS NO FURTHER QUESTIONS AT THIS TIME

## 2024-07-08 RX ORDER — AMLODIPINE BESYLATE 2.5 MG/1
2.5 TABLET ORAL DAILY
Qty: 90 TABLET | Refills: 3 | Status: SHIPPED | OUTPATIENT
Start: 2024-07-08

## 2024-09-09 RX ORDER — MEMANTINE HYDROCHLORIDE 10 MG/1
10 TABLET ORAL 2 TIMES DAILY
Qty: 60 TABLET | Refills: 0 | Status: SHIPPED | OUTPATIENT
Start: 2024-09-09

## 2024-09-09 NOTE — TELEPHONE ENCOUNTER
Rx Refill Note  Requested Prescriptions     Pending Prescriptions Disp Refills    memantine (NAMENDA) 10 MG tablet [Pharmacy Med Name: MEMANTINE HCL TABS 10MG] 180 tablet 3     Sig: TAKE 1 TABLET  TWO TIMES A DAY      Last filled: 9/6/23 30 with 11 refills.  Last office visit with prescribing clinician: 3/18/2024      Next office visit with prescribing clinician: 9/23/2024     Sent in 60 with 0 refills.    Johanny Miranda MA  09/09/24, 08:38 EDT

## 2024-09-19 ENCOUNTER — TELEPHONE (OUTPATIENT)
Dept: NEUROLOGY | Facility: CLINIC | Age: 84
End: 2024-09-19

## 2024-09-19 DIAGNOSIS — I49.1 PAC (PREMATURE ATRIAL CONTRACTION): ICD-10-CM

## 2024-09-19 DIAGNOSIS — I10 ESSENTIAL HYPERTENSION: ICD-10-CM

## 2024-09-19 RX ORDER — METOPROLOL SUCCINATE 25 MG/1
TABLET, EXTENDED RELEASE ORAL
Qty: 45 TABLET | Refills: 1 | Status: SHIPPED | OUTPATIENT
Start: 2024-09-19

## 2024-09-19 NOTE — TELEPHONE ENCOUNTER
Ronald    Best call back number:[phone]     PATIENT CALLED REQUESTING TO CANCEL SAME DAY APPT.    Did the patient call AFTER the start time of their scheduled appointment?  NO    Was the patient's appointment rescheduled?  NO    Any additional information:PT'S DAUGHTER CALLED AND HAD TO CANCEL PT'S APPT FOR TODAY AND WILL CALL BACK AND RESCHEDULE.    THANK YOU

## 2024-10-02 RX ORDER — MEMANTINE HYDROCHLORIDE 10 MG/1
10 TABLET ORAL 2 TIMES DAILY
Qty: 60 TABLET | Refills: 11 | OUTPATIENT
Start: 2024-10-02

## 2024-10-02 RX ORDER — MEMANTINE HYDROCHLORIDE 10 MG/1
10 TABLET ORAL 2 TIMES DAILY
Qty: 60 TABLET | Refills: 1 | Status: SHIPPED | OUTPATIENT
Start: 2024-10-02

## 2024-10-02 NOTE — TELEPHONE ENCOUNTER
Rx Refill Note  Requested Prescriptions     Pending Prescriptions Disp Refills    memantine (NAMENDA) 10 MG tablet 60 tablet 0     Sig: Take 1 tablet by mouth 2 (Two) Times a Day.      Last filled: 9/9/24 60 with 0 refills.  Last office visit with prescribing clinician: 3/18/2024      Next office visit with prescribing clinician: 11/7/2024     Sent in 60 with 1 refill.      Johanny Miranda MA  10/02/24, 15:38 EDT

## 2024-10-02 NOTE — TELEPHONE ENCOUNTER
Rx Refill Note  Requested Prescriptions     Pending Prescriptions Disp Refills    memantine (NAMENDA) 10 MG tablet [Pharmacy Med Name: MEMANTINE HCL TABS 10MG] 60 tablet 11     Sig: TAKE 1 TABLET TWICE A DAY      Last filled: 9/9//24 60 with 0 refills.  Last office visit with prescribing clinician: 3/18/2024      Next office visit with prescribing clinician: 11/7/2024     Patient needs appointment for further refills.    Johanny Miranda MA  10/02/24, 09:05 EDT

## 2024-11-12 ENCOUNTER — OFFICE VISIT (OUTPATIENT)
Dept: NEUROLOGY | Facility: CLINIC | Age: 84
End: 2024-11-12
Payer: MEDICARE

## 2024-11-12 VITALS
HEIGHT: 64 IN | OXYGEN SATURATION: 96 % | DIASTOLIC BLOOD PRESSURE: 74 MMHG | SYSTOLIC BLOOD PRESSURE: 142 MMHG | BODY MASS INDEX: 23.05 KG/M2 | WEIGHT: 135 LBS | HEART RATE: 54 BPM

## 2024-11-12 DIAGNOSIS — G30.9 ALZHEIMER'S DISEASE: Primary | ICD-10-CM

## 2024-11-12 DIAGNOSIS — F02.80 ALZHEIMER'S DISEASE: Primary | ICD-10-CM

## 2024-11-12 DIAGNOSIS — R26.9 GAIT DISORDER: ICD-10-CM

## 2024-11-12 NOTE — PROGRESS NOTES
Neuro Office Visit      Encounter Date: 2024   Patient Name: Maya Anand  : 1940   MRN: [id number]   PCP: DR Abdalla  Chief Complaint:    Chief Complaint   Patient presents with    Alzheimer's Disease       History of Present Illness: Maya Anand is a 84 y.o. female who is here today in Neurology for  alzheimer's disease, hypercalcemia, gait disorder    Last visit 3/18/2024 w me-cont donepezil and namenda. Mental status improved with normalized calcium. Use rollator to prevent falls.  History of Present Illness       Alzheimer's Disease  MMSE 26/30  3/18/2024  Dtr reports pt can be resistant to bathing, dressing and leaving the house.  is primary caregiver. Wearing depends. Sleeping through the night.  Family reports a major improvement in her mental status after starting Sensipar. Pt is more alert and less agitated. Memory and comprehension is improved. One fall but was able to get up on her own. Using rollator.  PH     Family did not start Abilify. Did not feel it was needed. Behavior is improved.  Previously family noted change in behavior with [facility] and refusal of care for  PT/OT. Pt does not want anyone in the house. Using rolling walker at home.     MMSE last visit .   MMSE today      Disoriented to date and day, cannot recall 3words  Meds:aricept, namenda 10 bid, Prevagen  Med management: . Will miss days  Appetite: decreased-has lost 10 pounds in 6 months.  Sleep:dreaming and acting out dreams  ADLs:can feed herself but need assistance with bathing and dressing  Gait/Falls:no falls since last visit.  Language:no change  Dysphagia:no  Driving:not driving  Hallucinations:none  Behavior:arguementative  Living situation:lives with   Finances:  POA:Both daughters        PH  Onset  w forgetfulness, word finding difficulty w assoc anxiety and depression.  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 per Dr Reese  Subjective      Past Medical History:   Past Medical History:   Diagnosis Date    Acid reflux     Anxiety     Chronic cerebral ischemia     Depression     Frequent UTI     JOSE (generalized anxiety disorder)     Herpes simplex     HTN (hypertension)     Hypothyroidism     Memory loss     Ovarian cyst     Peripheral edema        Past Surgical History:   Past Surgical History:   Procedure Laterality Date    COLONOSCOPY W/ BIOPSIES  2016    OVARIAN CYST REMOVAL      TOTAL KNEE ARTHROPLASTY Bilateral ,        Family History:   Family History   Problem Relation Age of Onset    Stroke Father        Social History:   Social History     Socioeconomic History    Marital status:      Spouse name: Janie    Number of children: 2   Tobacco Use    Smoking status: Former     Current packs/day: 0.00     Average packs/day: 0.3 packs/day for 5.0 years (1.3 ttl pk-yrs)     Types: Cigarettes     Start date:      Quit date:      Years since quittin.8     Passive exposure: Past    Smokeless tobacco: Never   Vaping Use    Vaping status: Never Used   Substance and Sexual Activity    Alcohol use: No    Drug use: Never    Sexual activity: Defer       Medications:     Current Outpatient Medications:     amLODIPine (NORVASC) 2.5 MG tablet, TAKE 1 TABLET DAILY, Disp: 90 tablet, Rfl: 3    Apoaequorin (Prevagen) 10 MG capsule, Take 20 mg by mouth Daily., Disp: , Rfl:     AZO-CRANBERRY PO, Take  by mouth Daily., Disp: , Rfl:     cinacalcet (Sensipar) 30 MG tablet, Take 1 tablet by mouth 2 (Two) Times a Day., Disp: 180 tablet, Rfl: 3    Cobalamin Combinations (Neuriva Plus) capsule, Take  by mouth., Disp: , Rfl:     cyanocobalamin (VITAMIN B-12) 500 MCG tablet, Take 2 tablets by mouth Daily., Disp: , Rfl:     D-Mannose 500 MG capsule, Take  by mouth 3 (Three) Times a Day.,  Disp: , Rfl:     donepezil (ARICEPT) 10 MG tablet, TAKE 1 TABLET DAILY, Disp: 90 tablet, Rfl: 3    escitalopram (LEXAPRO) 10 MG tablet, Take 1 tablet by mouth Every Morning., Disp: 90 tablet, Rfl: 3    Lactobacillus-Inulin (PROBIOTIC DIGESTIVE SUPPORT PO), Take  by mouth Daily., Disp: , Rfl:     levothyroxine (SYNTHROID, LEVOTHROID) 75 MCG tablet, Take 1 tablet by mouth Every Morning., Disp: 90 tablet, Rfl: 3    memantine (NAMENDA) 10 MG tablet, Take 1 tablet by mouth 2 (Two) Times a Day., Disp: 60 tablet, Rfl: 1    metoprolol succinate XL (TOPROL-XL) 25 MG 24 hr tablet, TAKE ONE-HALF (1/2) TABLET EVERY NIGHT, Disp: 45 tablet, Rfl: 1    Mirabegron ER (Myrbetriq) 50 MG tablet sustained-release 24 hour 24 hr tablet, Take 50 mg by mouth Daily., Disp: 7 tablet, Rfl: 0    Multiple Vitamins-Minerals (PRESERVISION AREDS 2 PO), Take  by mouth Daily., Disp: , Rfl:     Allergies:   Allergies   Allergen Reactions    Erythromycin Unknown - Low Severity     unknown    Penicillins        PHQ-9 Total Score:     STEADI Fall Risk Assessment was completed, and patient is at MODERATE risk for falls. Assessment completed on:11/12/2024    Objective     Physical Exam:   Physical Exam  Vitals and nursing note reviewed.   Constitutional:       General: She is not in acute distress.     Appearance: She is well-developed.   HENT:      Head: Normocephalic and atraumatic.      Right Ear: External ear normal.      Left Ear: External ear normal.      Nose: Nose normal.   Eyes:      General: No scleral icterus.        Right eye: No discharge.         Left eye: No discharge.      Conjunctiva/sclera: Conjunctivae normal.   Cardiovascular:      Rate and Rhythm: Normal rate.   Pulmonary:      Effort: Pulmonary effort is normal.   Musculoskeletal:         General: No deformity.      Cervical back: Neck supple.   Skin:     General: Skin is warm and dry.      Findings: No rash.   Neurological:      General: No focal deficit present.      Mental Status:  "She is alert. Mental status is at baseline.      Motor: Weakness present. No abnormal muscle tone.      Gait: Gait abnormal.      Comments: Gait assist x1, slow, short steps.   Psychiatric:         Mood and Affect: Mood normal.         Neurological Exam  Mental Status  Alert.    Gait   Abnormal gait.     Physical Exam        Vital Signs:   Vitals:    11/12/24 0918   BP: 142/74   Pulse: 54   SpO2: 96%   Weight: 61.2 kg (135 lb)   Height: 162.6 cm (64.02\")     Body mass index is 23.16 kg/m².         Assessment / Plan      Assessment/Plan:   There are no diagnoses linked to this encounter.   Assessment & Plan          Patient Education:       Reviewed medications, potential side effects and signs and symptoms to report. Discussed risk versus benefits of treatment plan with patient and/or family-including medications, labs and radiology that may be ordered. Addressed questions and concerns during visit. Patient and/or family verbalized understanding and agree with plan. Instructed to call the office with any questions and report to ER with any life-threatening symptoms.     Follow Up:   Return in about 6 months (around 5/12/2025) for Recheck.    During this visit the following were done:  Labs Reviewed []    Labs Ordered []    Radiology Reports Reviewed []    Radiology Ordered []    PCP Records Reviewed []    Referring Provider Records Reviewed []    ER Records Reviewed []    Hospital Records Reviewed []    History Obtained From Family [x]    Radiology Images Reviewed []    Other Reviewed []    Records Requested []      Patient or patient representative verbalized consent for the use of Ambient Listening during the visit with  Reagan Corral DNP, GABE for chart documentation. 11/12/2024  08:26 EST      Reagan Corral DNP, APRN  "

## 2024-11-20 RX ORDER — MEMANTINE HYDROCHLORIDE 10 MG/1
10 TABLET ORAL 2 TIMES DAILY
Qty: 180 TABLET | Refills: 3 | Status: SHIPPED | OUTPATIENT
Start: 2024-11-20

## 2024-11-20 NOTE — TELEPHONE ENCOUNTER
Rx Refill Note  Requested Prescriptions     Pending Prescriptions Disp Refills    memantine (NAMENDA) 10 MG tablet [Pharmacy Med Name: MEMANTINE HCL TABS 10MG] 60 tablet 11     Sig: TAKE 1 TABLET TWICE A DAY      Last filled: 10/2/24 60 with 1 refill.    Last office visit with prescribing clinician: 11/12/2024      Next office visit with prescribing clinician: 5/12/2025     Sent in 180 with 3 refills.    Johanny Miranda MA  11/20/24, 07:58 EST

## 2024-12-12 DIAGNOSIS — N39.41 URGE INCONTINENCE OF URINE: ICD-10-CM

## 2024-12-12 RX ORDER — MIRABEGRON 50 MG/1
50 TABLET, FILM COATED, EXTENDED RELEASE ORAL DAILY
Qty: 7 TABLET | Refills: 0 | Status: CANCELLED | OUTPATIENT
Start: 2024-12-12

## 2024-12-12 NOTE — TELEPHONE ENCOUNTER
Rx Refill Note  Requested Prescriptions     Pending Prescriptions Disp Refills    Mirabegron ER (MYRBETRIQ) 50 MG tablet sustained-release 24 hour 24 hr tablet [Pharmacy Med Name: MIRABEGRON ER TABS 50MG] 90 tablet 3     Sig: TAKE 1 TABLET DAILY      Last office visit with prescribing clinician: 12/11/2023   Last telemedicine visit with prescribing clinician: Visit date not found   Next office visit with prescribing clinician: 12/16/2024     Alejo Roberts MA  12/12/24, 12:54 EST

## 2024-12-13 RX ORDER — MIRABEGRON 50 MG/1
50 TABLET, FILM COATED, EXTENDED RELEASE ORAL DAILY
Qty: 90 TABLET | Refills: 3 | Status: SHIPPED | OUTPATIENT
Start: 2024-12-13

## 2024-12-16 ENCOUNTER — OFFICE VISIT (OUTPATIENT)
Age: 84
End: 2024-12-16
Payer: MEDICARE

## 2024-12-16 VITALS
OXYGEN SATURATION: 97 % | WEIGHT: 121.19 LBS | SYSTOLIC BLOOD PRESSURE: 116 MMHG | DIASTOLIC BLOOD PRESSURE: 72 MMHG | BODY MASS INDEX: 20.69 KG/M2 | HEART RATE: 68 BPM | HEIGHT: 64 IN

## 2024-12-16 DIAGNOSIS — Z00.00 MEDICARE ANNUAL WELLNESS VISIT, SUBSEQUENT: Primary | ICD-10-CM

## 2024-12-16 DIAGNOSIS — F41.1 GAD (GENERALIZED ANXIETY DISORDER): ICD-10-CM

## 2024-12-16 DIAGNOSIS — E03.9 ACQUIRED HYPOTHYROIDISM: ICD-10-CM

## 2024-12-16 DIAGNOSIS — F02.80 ALZHEIMER'S DISEASE: ICD-10-CM

## 2024-12-16 DIAGNOSIS — E21.3 HYPERPARATHYROIDISM: ICD-10-CM

## 2024-12-16 DIAGNOSIS — N39.41 URGE INCONTINENCE OF URINE: ICD-10-CM

## 2024-12-16 DIAGNOSIS — N18.32 STAGE 3B CHRONIC KIDNEY DISEASE: ICD-10-CM

## 2024-12-16 DIAGNOSIS — Z91.81 AT MODERATE RISK FOR FALL: ICD-10-CM

## 2024-12-16 DIAGNOSIS — F32.5 MAJOR DEPRESSION IN REMISSION: ICD-10-CM

## 2024-12-16 DIAGNOSIS — G30.9 ALZHEIMER'S DISEASE: ICD-10-CM

## 2024-12-16 DIAGNOSIS — E78.00 PURE HYPERCHOLESTEROLEMIA: ICD-10-CM

## 2024-12-16 PROCEDURE — 99397 PER PM REEVAL EST PAT 65+ YR: CPT | Performed by: FAMILY MEDICINE

## 2024-12-16 PROCEDURE — 1126F AMNT PAIN NOTED NONE PRSNT: CPT | Performed by: FAMILY MEDICINE

## 2024-12-16 PROCEDURE — 3074F SYST BP LT 130 MM HG: CPT | Performed by: FAMILY MEDICINE

## 2024-12-16 PROCEDURE — G0439 PPPS, SUBSEQ VISIT: HCPCS | Performed by: FAMILY MEDICINE

## 2024-12-16 PROCEDURE — 1160F RVW MEDS BY RX/DR IN RCRD: CPT | Performed by: FAMILY MEDICINE

## 2024-12-16 PROCEDURE — 3078F DIAST BP <80 MM HG: CPT | Performed by: FAMILY MEDICINE

## 2024-12-16 PROCEDURE — 1159F MED LIST DOCD IN RCRD: CPT | Performed by: FAMILY MEDICINE

## 2024-12-16 PROCEDURE — 1170F FXNL STATUS ASSESSED: CPT | Performed by: FAMILY MEDICINE

## 2024-12-16 RX ORDER — LEVOTHYROXINE SODIUM 75 UG/1
75 TABLET ORAL EVERY MORNING
Qty: 90 TABLET | Refills: 3 | Status: SHIPPED | OUTPATIENT
Start: 2024-12-16

## 2024-12-16 RX ORDER — ESCITALOPRAM OXALATE 10 MG/1
10 TABLET ORAL EVERY MORNING
Qty: 90 TABLET | Refills: 3 | Status: SHIPPED | OUTPATIENT
Start: 2024-12-16

## 2024-12-16 NOTE — PROGRESS NOTES
Subjective   The ABCs of the Annual Wellness Visit  Medicare Wellness Visit      Maya Anand is a 84 y.o. patient who presents for a Medicare Wellness Visit.    The following portions of the patient's history were reviewed and   updated as appropriate: allergies, current medications, past family history, past medical history, past social history, past surgical history, and problem list.    Compared to one year ago, the patient's physical   health is the same.  Compared to one year ago, the patient's mental   health is the same.    Recent Hospitalizations:  She was not admitted to the hospital during the last year.     Current Medical Providers:  Patient Care Team:  Ysabel Abdalla MD as PCP - General (Family Medicine)  Chemo Lemon III, MD as Cardiologist (Cardiology)  Randolph Collado APRN as Nurse Practitioner (Cardiology)  Gabriella Reese DO as Consulting Physician (Endocrinology)  Nelson Dorado APRN as Nurse Practitioner (Nurse Practitioner)  Reagan Corral DNP, APRN as Nurse Practitioner (Nurse Practitioner)    Outpatient Medications Prior to Visit   Medication Sig Dispense Refill    amLODIPine (NORVASC) 2.5 MG tablet TAKE 1 TABLET DAILY 90 tablet 3    Apoaequorin (Prevagen) 10 MG capsule Take 20 mg by mouth Daily.      AZO-CRANBERRY PO Take  by mouth Daily.      cinacalcet (Sensipar) 30 MG tablet Take 1 tablet by mouth 2 (Two) Times a Day. 180 tablet 3    Cobalamin Combinations (Neuriva Plus) capsule Take  by mouth.      cyanocobalamin (VITAMIN B-12) 500 MCG tablet Take 2 tablets by mouth Daily.      D-Mannose 500 MG capsule Take  by mouth 3 (Three) Times a Day.      donepezil (ARICEPT) 10 MG tablet TAKE 1 TABLET DAILY 90 tablet 3    Lactobacillus-Inulin (PROBIOTIC DIGESTIVE SUPPORT PO) Take  by mouth Daily.      memantine (NAMENDA) 10 MG tablet TAKE 1 TABLET TWICE A  tablet 3    metoprolol succinate XL (TOPROL-XL) 25 MG 24 hr tablet TAKE ONE-HALF (1/2) TABLET EVERY NIGHT 45  tablet 1    Mirabegron ER (MYRBETRIQ) 50 MG tablet sustained-release 24 hour 24 hr tablet TAKE 1 TABLET DAILY 90 tablet 3    Multiple Vitamins-Minerals (PRESERVISION AREDS 2 PO) Take  by mouth Daily.      escitalopram (LEXAPRO) 10 MG tablet Take 1 tablet by mouth Every Morning. 90 tablet 3    levothyroxine (SYNTHROID, LEVOTHROID) 75 MCG tablet Take 1 tablet by mouth Every Morning. 90 tablet 3     No facility-administered medications prior to visit.     No opioid medication identified on active medication list. I have reviewed chart for other potential  high risk medication/s and harmful drug interactions in the elderly.      Aspirin is not on active medication list.  Aspirin use is not indicated based on review of current medical condition/s. Risk of harm outweighs potential benefits.  .    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  ACP discussion was held with the patient during this visit. Patient has an advance directive (not in EMR), copy requested.            Objective   Vitals:    12/16/24 0941   BP: 116/72   BP Location: Left arm   Patient Position: Sitting  "  Cuff Size: Adult   Pulse: 68   SpO2: 97%   Weight: 55 kg (121 lb 3 oz)   Height: 162.6 cm (64.02\")   PainSc: 0-No pain       Estimated body mass index is 20.79 kg/m² as calculated from the following:    Height as of this encounter: 162.6 cm (64.02\").    Weight as of this encounter: 55 kg (121 lb 3 oz).    BMI is within normal parameters. No other follow-up for BMI required.       Does the patient have evidence of cognitive impairment? Yes                                                                                                Health  Risk Assessment    Smoking Status:  Social History     Tobacco Use   Smoking Status Former    Current packs/day: 0.00    Average packs/day: 0.3 packs/day for 5.0 years (1.3 ttl pk-yrs)    Types: Cigarettes    Start date:     Quit date:     Years since quittin.9    Passive exposure: Past   Smokeless Tobacco Never     Alcohol Consumption:  Social History     Substance and Sexual Activity   Alcohol Use No       Fall Risk Screen    STEADI Fall Risk Assessment was completed, and patient is at HIGH risk for falls. Assessment completed on:2024    Depression Screening     Little interest or pleasure in doing things? Not at all   Feeling down, depressed, or hopeless? Not at all   PHQ-2 Total Score 0      Health Habits and Functional and Cognitive Screenin/16/2024     9:38 AM   Functional & Cognitive Status   Do you have difficulty preparing food and eating? Yes   Do you have difficulty bathing yourself, getting dressed or grooming yourself? Yes   Do you have difficulty using the toilet? No   Do you have difficulty moving around from place to place? Yes   Do you have trouble with steps or getting out of a bed or a chair? Yes   Dental Exam Up to date   Eye Exam Up to date   Exercise (times per week) 0 times per week   Do you need help using the phone?  No   Are you deaf or do you have serious difficulty hearing?  Yes   Do you need help to go to places out of " walking distance? Yes   Do you need help shopping? Yes   Do you need help preparing meals?  Yes   Do you need help with housework?  Yes   Do you need help with laundry? Yes   Do you need help taking your medications? Yes   Do you need help managing money? Yes   Do you ever drive or ride in a car without wearing a seat belt? No   Have you felt unusual stress, anger or loneliness in the last month? No   Who do you live with? Spouse   If you need help, do you have trouble finding someone available to you? No   Have you been bothered in the last four weeks by sexual problems? No   Do you have difficulty concentrating, remembering or making decisions? Yes           Age-appropriate Screening Schedule:  Refer to the list below for future screening recommendations based on patient's age, sex and/or medical conditions. Orders for these recommended tests are listed in the plan section. The patient has been provided with a written plan.    Health Maintenance List  Health Maintenance   Topic Date Due    DXA SCAN  Never done    COLORECTAL CANCER SCREENING  Never done    ZOSTER VACCINE (1 of 2) Never done    LIPID PANEL  12/08/2024    ANNUAL WELLNESS VISIT  12/11/2024    TDAP/TD VACCINES (2 - Td or Tdap) 09/15/2027    COVID-19 Vaccine  Completed    RSV Vaccine - Adults  Completed    INFLUENZA VACCINE  Completed    Pneumococcal Vaccine 65+  Completed    MAMMOGRAM  Discontinued                                                                                                                                              Immunization History   Administered Date(s) Administered    ABRYSVO (RSV, 60+ or pregnant women 32-36 wks) 09/25/2023    COVID-19 (MODERNA) BIVALENT 12+YRS 09/13/2022    COVID-19 (PFIZER) 12YRS+ (COMIRNATY) 09/25/2023, 10/01/2024    COVID-19 (PFIZER) Purple Cap Monovalent 01/21/2021, 02/10/2021, 09/28/2021    Fluad Quad 65+ 09/13/2022    Fluzone High-Dose 65+YRS 10/01/2024    Fluzone High-Dose 65+yrs 05/30/2020,  06/30/2021, 09/23/2021, 09/25/2023    Pneumococcal Conjugate 20-Valent (PCV20) 12/08/2022    Tdap 09/15/2017       CMS Preventative Services Quick Reference  Risk Factors Identified During Encounter  Fall Risk-High or Moderate: Information on Fall Prevention Shared in After Visit Summary    The above risks/problems have been discussed with the patient.  Pertinent information has been shared with the patient in the After Visit Summary.  An After Visit Summary and PPPS were made available to the patient.    Follow Up:   Next Medicare Wellness visit to be scheduled in 1 year.         Additional E&M Note during same encounter follows:  Patient has additional, significant, and separately identifiable condition(s)/problem(s) that require work above and beyond the Medicare Wellness Visit     Chief Complaint  Medicare Wellness-subsequent    Subjective      HPI  Maya is also being seen today for an annual adult preventative physical exam.        The patient is an 84-year-old female presenting for an annual wellness visit. She is accompanied by her daughter, who is also contributing to history due to the patient's underlying dementia.    She reports that her physical and mental health status remains unchanged compared to the previous year. She has not required hospitalization within the past year. She does not take aspirin. She has an advanced care in place but not on file. She has consumed food within the last 8 hours, specifically a waffle for breakfast today. She reports no current health issues. She utilizes a walker for mobility at home and relies on a wheelchair for longer distances, such as for attending medical appointments. She has been receiving biannual ear wax removal treatments. She uses hearing aids.      IMMUNIZATIONS  She has received her influenza and COVID-19 boosters. Her tetanus vaccine is up to date. She received the RSV vaccine last year. Her pneumonia vaccine is current.            Objective   Vital  "Signs:  /72 (BP Location: Left arm, Patient Position: Sitting, Cuff Size: Adult)   Pulse 68   Ht 162.6 cm (64.02\")   Wt 55 kg (121 lb 3 oz)   SpO2 97%   BMI 20.79 kg/m²   Physical Exam  Constitutional:       General: She is not in acute distress.  HENT:      Right Ear: Tympanic membrane and ear canal normal. There is no impacted cerumen.      Left Ear: Tympanic membrane and ear canal normal. There is no impacted cerumen.      Ears:      Comments: Bilateral hearing aids     Nose: No congestion or rhinorrhea.      Mouth/Throat:      Mouth: Mucous membranes are moist.      Pharynx: No oropharyngeal exudate or posterior oropharyngeal erythema.   Eyes:      General:         Right eye: No discharge.         Left eye: No discharge.      Conjunctiva/sclera: Conjunctivae normal.      Comments: Wears glasses   Neck:      Thyroid: No thyromegaly.   Cardiovascular:      Rate and Rhythm: Normal rate and regular rhythm.   Pulmonary:      Effort: Pulmonary effort is normal.      Breath sounds: Normal breath sounds.   Abdominal:      Palpations: Abdomen is soft. There is no hepatomegaly.      Tenderness: There is no abdominal tenderness.   Musculoskeletal:      Cervical back: Neck supple.      Right lower leg: No edema.      Left lower leg: No edema.      Comments: Seated in wheelchair.  She requires 2 person assist to stand with slow shuffling gait.   Lymphadenopathy:      Head:      Right side of head: No submandibular, preauricular or posterior auricular adenopathy.      Left side of head: No submandibular, preauricular or posterior auricular adenopathy.      Cervical: No cervical adenopathy.   Skin:     General: Skin is warm.   Neurological:      Mental Status: She is alert and oriented to person, place, and time.   Psychiatric:         Mood and Affect: Mood normal.         Behavior: Behavior is cooperative.         Cognition and Memory: Memory is impaired.                 The following data was reviewed by: Ysabel HAMPTON" MD Lianne on 12/16/2024:      Office Visit with Reagan Corral DNP, APRN (11/12/2024)   Results                Assessment and Plan   Additional age appropriate preventative wellness advice topics were discussed during today's preventative wellness exam(some topics already addressed during AWV portion of the note above):    Physical Activity: Advised cardiovascular activity 150 minutes per week as tolerated. (example brisk walk for 30 minutes, 5 days a week).  Diagnoses and all orders for this visit:    1. Medicare annual wellness visit, subsequent (Primary)    2. At moderate risk for fall    3. Pure hypercholesterolemia  -     Lipid Panel; Future    4. Acquired hypothyroidism  -     TSH; Future  -     T4, Free; Future  -     levothyroxine (SYNTHROID, LEVOTHROID) 75 MCG tablet; Take 1 tablet by mouth Every Morning.  Dispense: 90 tablet; Refill: 3    5. Hyperparathyroidism  -     PTH, Intact & Calcium; Future  -     Vitamin D,25-Hydroxy; Future    6. Stage 3b chronic kidney disease  -     CBC (No Diff); Future  -     Comprehensive Metabolic Panel; Future    7. Alzheimer's disease    8. Urge incontinence of urine    9. JOSE (generalized anxiety disorder)  -     escitalopram (LEXAPRO) 10 MG tablet; Take 1 tablet by mouth Every Morning.  Dispense: 90 tablet; Refill: 3    10. Major depression in remission  -     escitalopram (LEXAPRO) 10 MG tablet; Take 1 tablet by mouth Every Morning.  Dispense: 90 tablet; Refill: 3           1. Annual wellness visit.   She has received her influenza and COVID-19 boosters, and her tetanus, RSV, and pneumonia vaccines are current. She is advised to engage in regular exercises to maintain leg strength. A comprehensive panel of fasting blood work will be ordered to monitor her overall health status, including thyroid function, calcium levels, kidney function, and anemia. Her medications, including Myrbetriq for incontinence, levothyroxine for thyroid management, and Lexapro for  anxiety and depression, have been refilled and will be continued as per the current regimen.    Follow-up  The patient will follow up in 1 year, or earlier if necessary.            Follow Up   Return in about 1 year (around 12/17/2025) for MWV and fasting labs.  Patient was given instructions and counseling regarding her condition or for health maintenance advice. Please see specific information pulled into the AVS if appropriate.  Patient or patient representative verbalized consent for the use of Ambient Listening during the visit with  Ysabel Abdalla MD for chart documentation. 12/16/2024  09:58 EST    Electronically signed by Ysabel Abdalla MD, 12/16/24, 9:59 AM EST.

## 2024-12-16 NOTE — PATIENT INSTRUCTIONS
Advance Care Planning and Advance Directives     You make decisions on a daily basis - decisions about where you want to live, your career, your home, your life. Perhaps one of the most important decisions you face is your choice for future medical care. Take time to talk with your family and your healthcare team and start planning today.  Advance Care Planning is a process that can help you:  Understand possible future healthcare decisions in light of your own experiences  Reflect on those decision in light of your goals and values  Discuss your decisions with those closest to you and the healthcare professionals that care for you  Make a plan by creating a document that reflects your wishes    Surrogate Decision Maker  In the event of a medical emergency, which has left you unable to communicate or to make your own decisions, you would need someone to make decisions for you.  It is important to discuss your preferences for medical treatment with this person while you are in good health.     Qualities of a surrogate decision maker:  Willing to take on this role and responsibility  Knows what you want for future medical care  Willing to follow your wishes even if they don't agree with them  Able to make difficult medical decisions under stressful circumstances    Advance Directives  These are legal documents you can create that will guide your healthcare team and decision maker(s) when needed. These documents can be stored in the electronic medical record.    Living Will - a legal document to guide your care if you have a terminal condition or a serious illness and are unable to communicate. States vary by statute in document names/types, but most forms may include one or more of the following:        -  Directions regarding life-prolonging treatments        -  Directions regarding artificially provided nutrition/hydration        -  Choosing a healthcare decision maker        -  Direction regarding organ/tissue  donation    Durable Power of  for Healthcare - this document names an -in-fact to make medical decisions for you, but it may also allow this person to make personal and financial decisions for you. Please seek the advice of an  if you need this type of document.    **Advance Directives are not required and no one may discriminate against you if you do not sign one.    Medical Orders  Many states allow specific forms/orders signed by your physician to record your wishes for medical treatment in your current state of health. This form, signed in personal communication with your physician, addresses resuscitation and other medical interventions that you may or may not want.      For more information or to schedule a time with a [facility] Advance Care Planning Facilitator contact: [facility] or call [phone] and someone will contact you directly.  Fall Prevention in the Home, Adult  Falls can cause injuries and affect people of all ages. There are many simple things that you can do to make your home safe and to help prevent falls.  If you need it, ask for help making these changes.  What actions can I take to prevent falls?  General information  Use good lighting in all rooms. Make sure to:  Replace any light bulbs that burn out.  Turn on lights if it is dark and use night-lights.  Keep items that you use often in easy-to-reach places. Lower the shelves around your home if needed.  Move furniture so that there are clear paths around it.  Do not keep throw rugs or other things on the floor that can make you trip.  If any of your floors are uneven, fix them.  Add color or contrast paint or tape to clearly michael and help you see:  Grab bars or handrails.  First and last steps of staircases.  Where the edge of each step is.  If you use a ladder or stepladder:  Make sure that it is fully opened. Do not climb a closed ladder.  Make sure the sides of the ladder are locked in  place.  Have someone hold the ladder while you use it.  Know where your pets are as you move through your home.  What can I do in the bathroom?         Keep the floor dry. Clean up any water that is on the floor right away.  Remove soap buildup in the bathtub or shower. Buildup makes bathtubs and showers slippery.  Use non-skid mats or decals on the floor of the bathtub or shower.  Attach bath mats securely with double-sided, non-slip rug tape.  If you need to sit down while you are in the shower, use a non-slip stool.  Install grab bars by the toilet and in the bathtub and shower. Do not use towel bars as grab bars.  What can I do in the bedroom?  Make sure that you have a light by your bed that is easy to reach.  Do not use any sheets or blankets on your bed that hang to the floor.  Have a firm bench or chair with side arms that you can use for support when you get dressed.  What can I do in the kitchen?  Clean up any spills right away.  If you need to reach something above you, use a sturdy step stool that has a grab bar.  Keep electrical cables out of the way.  Do not use floor polish or wax that makes floors slippery.  What can I do with my stairs?  Do not leave anything on the stairs.  Make sure that you have a light switch at the top and the bottom of the stairs. Have them installed if you do not have them.  Make sure that there are handrails on both sides of the stairs. Fix handrails that are broken or loose. Make sure that handrails are as long as the staircases.  Install non-slip stair treads on all stairs in your home if they do not have carpet.  Avoid having throw rugs at the top or bottom of stairs, or secure the rugs with carpet tape to prevent them from moving.  Choose a carpet design that does not hide the edge of steps on the stairs. Make sure that carpet is firmly attached to the stairs. Fix any carpet that is loose or worn.  What can I do on the outside of my home?  Use bright outdoor  lighting.  Repair the edges of walkways and driveways and fix any cracks. Clear paths of anything that can make you trip, such as tools or rocks.  Add color or contrast paint or tape to clearly michael and help you see high doorway thresholds.  Trim any bushes or trees on the main path into your home.  Check that handrails are securely fastened and in good repair. Both sides of all steps should have handrails.  Install guardrails along the edges of any raised decks or porches.  Have leaves, snow, and ice cleared regularly. Use sand, salt, or ice melt on walkways during winter months if you live where there is ice and snow.  In the garage, clean up any spills right away, including grease or oil spills.  What other actions can I take?  Review your medicines with your health care provider. Some medicines can make you confused or feel dizzy. This can increase your chance of falling.  Wear closed-toe shoes that fit well and support your feet. Wear shoes that have rubber soles and low heels.  Use a cane, walker, scooter, or crutches that help you move around if needed.  Talk with your provider about other ways that you can decrease your risk of falls. This may include seeing a physical therapist to learn to do exercises to improve movement and strength.  Where to find more information  Centers for Disease Control and PreventionHUMA: cdc.gov  National [place] on Aging: janette.nih.gov  National [place] on Aging: janette.nih.gov  Contact a health care provider if:  You are afraid of falling at home.  You feel weak, drowsy, or dizzy at home.  You fall at home.  Get help right away if you:  Lose consciousness or have trouble moving after a fall.  Have a fall that causes a head injury.  These symptoms may be an emergency. Get help right away. Call 911.  Do not wait to see if the symptoms will go away.  Do not drive yourself to the hospital.  This information is not intended to replace advice given to you by your health care  provider. Make sure you discuss any questions you have with your health care provider.  Document Revised: 08/21/2023 Document Reviewed: 08/21/2023  Elsevier Patient Education © 2024 [employer]    Sit-to-Stand Exercise    The sit-to-stand exercise (also known as the chair stand or chair rise exercise) strengthens your lower body and helps you maintain or improve your mobility and independence. The end goal is to do the sit-to-stand exercise without using your hands. This will be easier as you become stronger. You should always talk with your health care provider before starting any exercise program, especially if you have had recent surgery.  Do the exercise exactly as told by your health care provider and adjust it as directed. It is normal to feel mild stretching, pulling, tightness, or discomfort as you do this exercise, but you should stop right away if you feel sudden pain or your pain gets worse. Do not begin doing this exercise until told by your health care provider.  What the sit-to-stand exercise does  The sit-to-stand exercise helps to strengthen the muscles in your thighs and the muscles in the center of your body that give you stability (core muscles). This exercise is especially helpful if:  You have had knee or hip surgery.  You have trouble getting up from a chair, out of a car, or off the toilet due to muscle weakness.  How to do the sit-to-stand exercise  Sit toward the front edge of a sturdy chair without armrests. Your knees should be bent and your feet should be flat on the floor and shoulder-width apart and underneath your hips.  Place your hands lightly on each side of the seat. Keep your back and neck as straight as possible, with your chest slightly forward.  Breathe in slowly. Lean forward and slightly shift your weight to the front of your feet.  Breathe out as you slowly stand up. Try not to support any weight with your hands.  Stand and pause for a full breath in and out.  Breathe in  as you sit down slowly. Tighten your core and abdominal muscles to control your lowering as much as possible. You should lower yourself back to the chair slowly, not just drop back into the seat.  Breathe out slowly.  Do this exercise 10-15 times. If needed, do it fewer times until you build up strength.  Rest for 1 minute, then do another set of 10-15 repetitions.  To change the difficulty of the sit-to-stand exercise  If the exercise is too difficult, use a chair with sturdy armrests, and push off the armrests to help you come to the standing position. You can also use the armrests to help slowly lower yourself back to sitting. As this gets easier, try to use your arms less. You can also place a firm cushion or pillow on the chair to make the surface higher.  If this exercise is too easy, do not use your arms to help raise or lower yourself. You can also wear a weighted vest, use hand weights, increase your repetitions, or try a lower chair.  General tips  You may feel tired when starting an exercise routine. This is normal.  You may have muscle soreness that lasts a few days. This is normal. As you get stronger, you may not feel muscle soreness.  Use smooth, steady movements.  Do not  hold your breath during strength exercises. This can cause unsafe changes in your blood pressure.  Breathe in slowly through your nose, and breathe out slowly through your mouth.  Summary  Strengthening your lower body is an important step to help you move safely and independently.  The sit-to-stand exercise helps strengthen the muscles in your thighs and core.  You should always talk with your health care provider before starting any exercise program, especially if you have had recent surgery.  This information is not intended to replace advice given to you by your health care provider. Make sure you discuss any questions you have with your health care provider.  Document Revised: 04/10/2022 Document Reviewed: 04/10/2022  [employer]  Patient Education ©  [employer]    You are due for Shingrix vaccination series ( the newest shingles vaccine).  It is a two shot series spaced 2-6 months apart. Please get this vaccine series started at your earliest convenience at your local pharmacy to help avoid shingles outbreak. It is more effective than the old Zostavax vaccine and is recommended even if you have had the Zostavax vaccine in the past.  Once the Shingrix series is completed, it does not need to be repeated.   For more information, please look at the website below:  https://www.cdc.gov/vaccines/vpd/shingles/public/shingrix/index.html      Medicare Wellness  Personal Prevention Plan of Service     Date of Office Visit:    Encounter Provider:  Ysabel Abdalla MD  Place of Service:  [facility]  Patient Name: Maya Anand  :  1940    As part of the Medicare Wellness portion of your visit today, we are providing you with this personalized preventive plan of services (PPPS). This plan is based upon recommendations of the [place] Preventive Services Task Force (USPSTF) and the Advisory Committee on Immunization Practices (ACIP).    This lists the preventive care services that should be considered, and provides dates of when you are due. Items listed as completed are up-to-date and do not require any further intervention.    Health Maintenance   Topic Date Due   • DXA SCAN  Never done   • COLORECTAL CANCER SCREENING  Never done   • ZOSTER VACCINE (1 of 2) Never done   • LIPID PANEL  2024   • ANNUAL WELLNESS VISIT  2024   • TDAP/TD VACCINES (2 - Td or Tdap) 09/15/2027   • COVID-19 Vaccine  Completed   • RSV Vaccine - Adults  Completed   • INFLUENZA VACCINE  Completed   • Pneumococcal Vaccine 65+  Completed   • MAMMOGRAM  Discontinued       No orders of the defined types were placed in this encounter.      No follow-ups on file.

## 2024-12-18 DIAGNOSIS — E03.9 ACQUIRED HYPOTHYROIDISM: ICD-10-CM

## 2024-12-18 DIAGNOSIS — E21.0 PRIMARY HYPERPARATHYROIDISM: ICD-10-CM

## 2024-12-18 RX ORDER — LEVOTHYROXINE SODIUM 75 UG/1
75 TABLET ORAL EVERY MORNING
Qty: 90 TABLET | Refills: 3 | OUTPATIENT
Start: 2024-12-18

## 2024-12-18 RX ORDER — CINACALCET 30 MG/1
30 TABLET, FILM COATED ORAL 2 TIMES DAILY
Qty: 60 TABLET | Refills: 0 | Status: SHIPPED | OUTPATIENT
Start: 2024-12-18

## 2024-12-18 NOTE — TELEPHONE ENCOUNTER
Rx Refill Note  Requested Prescriptions     Pending Prescriptions Disp Refills    levothyroxine (SYNTHROID, LEVOTHROID) 75 MCG tablet [Pharmacy Med Name: L-THYROXINE (SYNTHROID) TABS 75MCG] 90 tablet 3     Sig: TAKE 1 TABLET EVERY MORNING      Last office visit with prescribing clinician: 12/16/2024   Last telemedicine visit with prescribing clinician: Visit date not found   Next office visit with prescribing clinician: 12/22/2025     Ramya Odell MA  12/18/24, 11:06 EST    Sent in two days ago, declining request

## 2024-12-18 NOTE — TELEPHONE ENCOUNTER
Rx Refill Note  Requested Prescriptions     Pending Prescriptions Disp Refills    cinacalcet (SENSIPAR) 30 MG tablet [Pharmacy Med Name: CINACALCET TABS 30MG] 60 tablet 0     Sig: TAKE 1 TABLET TWICE A DAY      Last office visit with prescribing clinician: 1/8/2024   Last telemedicine visit with prescribing clinician: Visit date not found   Next office visit with prescribing clinician: 1/13/2025                         Would you like a call back once the refill request has been completed: [] Yes [] No    If the office needs to give you a call back, can they leave a voicemail: [] Yes [] No    Dasia Wise MA  12/18/24, 11:41 EST

## 2024-12-31 ENCOUNTER — LAB (OUTPATIENT)
Dept: LAB | Facility: HOSPITAL | Age: 84
End: 2024-12-31
Payer: MEDICARE

## 2024-12-31 DIAGNOSIS — E78.00 PURE HYPERCHOLESTEROLEMIA: ICD-10-CM

## 2024-12-31 DIAGNOSIS — N18.32 STAGE 3B CHRONIC KIDNEY DISEASE: ICD-10-CM

## 2024-12-31 DIAGNOSIS — E21.0 PRIMARY HYPERPARATHYROIDISM: ICD-10-CM

## 2024-12-31 DIAGNOSIS — E21.3 HYPERPARATHYROIDISM: ICD-10-CM

## 2024-12-31 DIAGNOSIS — E03.9 ACQUIRED HYPOTHYROIDISM: ICD-10-CM

## 2024-12-31 LAB
25(OH)D3 SERPL-MCNC: 45.5 NG/ML (ref 30–100)
ALBUMIN SERPL-MCNC: 3.4 G/DL (ref 3.5–5.2)
ALBUMIN/GLOB SERPL: 1.1 G/DL
ALP SERPL-CCNC: 90 U/L (ref 39–117)
ALT SERPL W P-5'-P-CCNC: 22 U/L (ref 1–33)
ANION GAP SERPL CALCULATED.3IONS-SCNC: 11 MMOL/L (ref 5–15)
AST SERPL-CCNC: 27 U/L (ref 1–32)
BILIRUB SERPL-MCNC: 0.2 MG/DL (ref 0–1.2)
BUN SERPL-MCNC: 16 MG/DL (ref 8–23)
BUN/CREAT SERPL: 13.4 (ref 7–25)
CALCIUM SPEC-SCNC: 8.5 MG/DL (ref 8.6–10.5)
CALCIUM SPEC-SCNC: 9 MG/DL (ref 8.6–10.5)
CHLORIDE SERPL-SCNC: 107 MMOL/L (ref 98–107)
CHOLEST SERPL-MCNC: 226 MG/DL (ref 0–200)
CO2 SERPL-SCNC: 25 MMOL/L (ref 22–29)
CREAT SERPL-MCNC: 1.19 MG/DL (ref 0.57–1)
DEPRECATED RDW RBC AUTO: 41.3 FL (ref 37–54)
EGFRCR SERPLBLD CKD-EPI 2021: 45.2 ML/MIN/1.73
ERYTHROCYTE [DISTWIDTH] IN BLOOD BY AUTOMATED COUNT: 13.4 % (ref 12.3–15.4)
GLOBULIN UR ELPH-MCNC: 3.1 GM/DL
GLUCOSE SERPL-MCNC: 87 MG/DL (ref 65–99)
HCT VFR BLD AUTO: 32.3 % (ref 34–46.6)
HDLC SERPL-MCNC: 66 MG/DL (ref 40–60)
HGB BLD-MCNC: 10.7 G/DL (ref 12–15.9)
LDLC SERPL CALC-MCNC: 139 MG/DL (ref 0–100)
LDLC/HDLC SERPL: 2.06 {RATIO}
MCH RBC QN AUTO: 28.5 PG (ref 26.6–33)
MCHC RBC AUTO-ENTMCNC: 33.1 G/DL (ref 31.5–35.7)
MCV RBC AUTO: 85.9 FL (ref 79–97)
PLATELET # BLD AUTO: 294 10*3/MM3 (ref 140–450)
PMV BLD AUTO: 10.2 FL (ref 6–12)
POTASSIUM SERPL-SCNC: 4.4 MMOL/L (ref 3.5–5.2)
PROT SERPL-MCNC: 6.5 G/DL (ref 6–8.5)
PTH-INTACT SERPL-MCNC: 107 PG/ML (ref 15–65)
RBC # BLD AUTO: 3.76 10*6/MM3 (ref 3.77–5.28)
SODIUM SERPL-SCNC: 143 MMOL/L (ref 136–145)
T4 FREE SERPL-MCNC: 1.38 NG/DL (ref 0.92–1.68)
TRIGL SERPL-MCNC: 119 MG/DL (ref 0–150)
TSH SERPL DL<=0.05 MIU/L-ACNC: 3.55 UIU/ML (ref 0.27–4.2)
VLDLC SERPL-MCNC: 21 MG/DL (ref 5–40)
WBC NRBC COR # BLD AUTO: 8.35 10*3/MM3 (ref 3.4–10.8)

## 2024-12-31 PROCEDURE — 84443 ASSAY THYROID STIM HORMONE: CPT

## 2024-12-31 PROCEDURE — 82306 VITAMIN D 25 HYDROXY: CPT

## 2024-12-31 PROCEDURE — 85027 COMPLETE CBC AUTOMATED: CPT

## 2024-12-31 PROCEDURE — 84439 ASSAY OF FREE THYROXINE: CPT

## 2024-12-31 PROCEDURE — 80061 LIPID PANEL: CPT

## 2024-12-31 PROCEDURE — 80053 COMPREHEN METABOLIC PANEL: CPT

## 2024-12-31 PROCEDURE — 83970 ASSAY OF PARATHORMONE: CPT

## 2025-01-02 RX ORDER — CINACALCET 30 MG/1
30 TABLET, FILM COATED ORAL 2 TIMES DAILY
Qty: 60 TABLET | Refills: 11 | OUTPATIENT
Start: 2025-01-02

## 2025-02-17 ENCOUNTER — PRIOR AUTHORIZATION (OUTPATIENT)
Dept: ENDOCRINOLOGY | Facility: CLINIC | Age: 85
End: 2025-02-17
Payer: MEDICARE

## 2025-02-17 NOTE — TELEPHONE ENCOUNTER
KAROLINE ALVAREZ (Key: [id number])  PA Case ID #: [id number]  Need Help? Call us at [phone]  Outcome  Approved today by [employer] 2017  CaseId:[id number];Status:Approved;Review Type:Prior Auth;Coverage Start Date:01/18/2025;Coverage End Date:02/17/2026;  Authorization Expiration Date: 2/16/2026  Drug  Cinacalcet HCl 30MG tablets  ePA cloud logo  Form  [employer] Electronic PA Form (2017 NCPDP)

## 2025-03-18 DIAGNOSIS — I10 ESSENTIAL HYPERTENSION: ICD-10-CM

## 2025-03-18 DIAGNOSIS — I49.1 PAC (PREMATURE ATRIAL CONTRACTION): ICD-10-CM

## 2025-03-18 RX ORDER — METOPROLOL SUCCINATE 25 MG/1
12.5 TABLET, EXTENDED RELEASE ORAL NIGHTLY
Qty: 45 TABLET | Refills: 0 | Status: SHIPPED | OUTPATIENT
Start: 2025-03-18

## 2025-03-31 RX ORDER — DONEPEZIL HYDROCHLORIDE 10 MG/1
10 TABLET, FILM COATED ORAL DAILY
Qty: 30 TABLET | Refills: 1 | Status: SHIPPED | OUTPATIENT
Start: 2025-03-31

## 2025-03-31 NOTE — TELEPHONE ENCOUNTER
Rx Refill Note  Requested Prescriptions     Pending Prescriptions Disp Refills    donepezil (ARICEPT) 10 MG tablet [Pharmacy Med Name: DONEPEZIL HCL TABS 10MG] 90 tablet 3     Sig: TAKE 1 TABLET DAILY      Last filled: 4/3/24 90 with 3 refills.  Last office visit with prescribing clinician: 11/12/2024      Next office visit with prescribing clinician: 5/12/2025     Sent in 30 with 1 refill.    Johanny Miranda MA  03/31/25, 08:04 EDT  
no abdominal pain, no bloating, no constipation, no diarrhea, no nausea and no vomiting.

## 2025-04-24 ENCOUNTER — DOCUMENTATION (OUTPATIENT)
Dept: NEUROLOGY | Facility: CLINIC | Age: 85
End: 2025-04-24
Payer: MEDICARE

## 2025-04-24 NOTE — PROGRESS NOTES
On Tuesday, on April 22nd, I had a visit with both Indy and Ann about their mother. Ann has medical POA and her sister Alaina has financial POA over both their mother and father. Their mother is 84 years old while their father is 87 years old and she has had a steady decline they feel and their father who is primary caregiver is also memory wise declining. The parents rely and expect their daughters to help out a lot. Their father himself didn't do well on his memory testing by his primary care provider and for them that is concerning. Both parents, other than the daughters, don't want to accept any help and don't want anybody to come into the house.That has been how they were their whole life but it is coming to a point where they feel like their mother needs more help than their father can provide. Their father fills her medicine box for her, he makes sure she gets her morning medications but mid day and evening pills they are unsure of so they have moved all important medications that they know she needs to the morning to make sure she has what she needs. They asked if they can switch the Aricept to morning so she is getting the medicine. He does keep a list of her medications to make sure he puts them in the box but I addressed the concern of if his memory is declining, if he can do the box and medications. I've asked that they go over and make sure the medications are correct and they already do that. He is awake at 3 AM til 6 PM which makes it hard for the patient to get the night medications. Bathroom is not handicapped accessible and they struggle with getting her in the bathroom. They aren't able to get her into the shower due to a step over edge but she sits on the toilet and he tries to wash her off. The daughters say that she has a very strong smell, she only washes her hair once a week and she has someone do it. The  has very limited movement with the left arm and so he can only use the  right arm to help her. She has fallen 7 times in last year and this time they were able to get her off the floor by themselves this time somehow. Food wise, they both struggle with nutrition. They only eat two meals a day, no dinner. The daughters have set up for them to be door dashed food daily for lunch. We talked about what needs to be done to try and get the mother out of the house. She will fight them in trying to get her moved to an assisted living/ memory care. They had her take the Abilify but just for a couple of days because it made her so drowsy, she couldn't function at all.

## 2025-04-25 ENCOUNTER — TELEPHONE (OUTPATIENT)
Dept: NEUROLOGY | Facility: CLINIC | Age: 85
End: 2025-04-25
Payer: MEDICARE

## 2025-04-25 NOTE — TELEPHONE ENCOUNTER
Called Indy with recommendations after meeting. Yes, POA would stand, might need more documentation depending on facility but the patient needs assistance. We talked about that help coming into the home would not be enough at this time unless it was 24 hour care versus 12 hours and  doing 12 hours. I expressed to them my safety concerns of her staying at home especially at night when her  is sleeping. They understood but was shocked. I did say that I would start picking the facilities that they want and start getting the process of how to get their mother in. Multiple times I went over the fact that I am concerned long term also about her also with them having also seen a decline in their father's memory. Indy said that he is still able to pay the bills and take care of himself but I expressed my concern of him hiding his memory in the sense if he would start forgetting to give her medicine or if he forgets he has given it and gives her more because of it. They understood and are just concerned about how they will take it. They feel like being told by a provider will make it better then coming from their daughters.

## 2025-04-25 NOTE — TELEPHONE ENCOUNTER
Called Ann with recommendations after meeting. Yes, POA would stand, might need more documentation depending on facility but the patient needs assistance. We talked about that help coming into the home would not be enough at this time unless it was 24 hour care versus 12 hours and  doing 12 hours. I expressed to them my safety concerns of her staying at home especially at night when her  is sleeping. They understood but was shocked. I did say that I would start picking the facilities that they want and start getting the process of how to get their mother in. Multiple times I went over the fact that I am concerned long term  because of my concern of him hiding his memory in the sense if he would start forgetting to give her medicine or if he forgets he has given it and gives her more because of it. They understood and are just concerned about how they will take it. They feel like being told by a provider will make it better then coming from their daughters.

## 2025-04-28 ENCOUNTER — TELEPHONE (OUTPATIENT)
Dept: NEUROLOGY | Facility: CLINIC | Age: 85
End: 2025-04-28
Payer: MEDICARE

## 2025-04-28 NOTE — TELEPHONE ENCOUNTER
Touring multiple facilities, one of the suggestions from the facilities for getting their mother to come is to say that it is a 30 day temporary rehab assignment, getting her adjusted, and then just extending it. The facility said they would need a script for rehab. They have looked at 4 places today and toured 3 of them. They just want to see how we would approach in the visit.

## 2025-05-02 ENCOUNTER — TELEPHONE (OUTPATIENT)
Dept: NEUROLOGY | Facility: CLINIC | Age: 85
End: 2025-05-02
Payer: MEDICARE

## 2025-05-02 NOTE — TELEPHONE ENCOUNTER
Talked to Etelvina about the appointment coming up. She then had some questions about the process with assisted living. We discussed those and will keep updated.

## 2025-05-12 ENCOUNTER — OFFICE VISIT (OUTPATIENT)
Dept: NEUROLOGY | Facility: CLINIC | Age: 85
End: 2025-05-12
Payer: MEDICARE

## 2025-05-12 VITALS
WEIGHT: 133.4 LBS | HEIGHT: 64 IN | DIASTOLIC BLOOD PRESSURE: 68 MMHG | OXYGEN SATURATION: 98 % | HEART RATE: 64 BPM | BODY MASS INDEX: 22.77 KG/M2 | SYSTOLIC BLOOD PRESSURE: 118 MMHG

## 2025-05-12 DIAGNOSIS — G30.9 ALZHEIMER'S DISEASE: Primary | ICD-10-CM

## 2025-05-12 DIAGNOSIS — R29.6 FREQUENT FALLS: ICD-10-CM

## 2025-05-12 DIAGNOSIS — F02.80 ALZHEIMER'S DISEASE: Primary | ICD-10-CM

## 2025-05-12 DIAGNOSIS — T14.8XXA OPEN WOUND OF SKIN: ICD-10-CM

## 2025-05-12 DIAGNOSIS — R26.9 GAIT DISORDER: ICD-10-CM

## 2025-05-12 DIAGNOSIS — Z78.9 SELF-CARE DEFICIT: ICD-10-CM

## 2025-05-12 PROCEDURE — 1160F RVW MEDS BY RX/DR IN RCRD: CPT | Performed by: NURSE PRACTITIONER

## 2025-05-12 PROCEDURE — 3074F SYST BP LT 130 MM HG: CPT | Performed by: NURSE PRACTITIONER

## 2025-05-12 PROCEDURE — 3078F DIAST BP <80 MM HG: CPT | Performed by: NURSE PRACTITIONER

## 2025-05-12 PROCEDURE — 99214 OFFICE O/P EST MOD 30 MIN: CPT | Performed by: NURSE PRACTITIONER

## 2025-05-12 PROCEDURE — 1159F MED LIST DOCD IN RCRD: CPT | Performed by: NURSE PRACTITIONER

## 2025-05-12 RX ORDER — VALACYCLOVIR HYDROCHLORIDE 1 G/1
1000 TABLET, FILM COATED ORAL
COMMUNITY

## 2025-05-12 NOTE — PROGRESS NOTES
Neuro Office Visit      Encounter Date: 2025   Patient Name: Maya Anand  : 1940   MRN: [id number]   PCP: Dr Abdalla  Chief Complaint:    Chief Complaint   Patient presents with    Alzheimer's Disease       History of Present Illness: Maya Anand is a 84 y.o. female who is here today in Neurology for  alzheimer's disease    Last visit 3/18/2024  History of Present Illness      Alzheimer's Disease  MMSE 26/30  3/18/2024  MMSE 25/30 2025      Pt with frequent falls. Hairdresser found a bruise on her scalp. Also has a leg wound.  is no longer able to care for her at home due decreased cognition and limited use of both of his arms. He bathes her once a week on the toilet and is changing bandage on her leg once a week. He buys [employer] for breakfast and sandwhiches for lunch. Not eating an evening meal. Family is interested in rehab due to lack of care in the home and frerquent falls.    Dtr reports pt can be resistant to bathing, dressing and leaving the house.  is primary caregiver. Wearing depends. Sleeping through the night.  PH     Family did not start Abilify. Did not feel it was needed. Behavior is improved.  Previously family noted change in behavior with [facility] and refusal of care for  PT/OT. Pt does not want anyone in the house. Using rolling walker at home.     MMSE last visit .   MMSE today      Disoriented to date and day, cannot recall 3words  Meds:aricept, namenda 10 bid, Prevagen  Med management: . Will miss days  Appetite: decreased-has lost 10 pounds in 6 months.  Sleep:dreaming and acting out dreams  ADLs:can feed herself but need assistance with bathing and dressing  Gait/Falls:no falls since last visit.  Language:no change  Dysphagia:no  Driving:not driving  Hallucinations:none  Behavior:arguementative  Living situation:lives with   Finances:  POA:Both daughters        PH  Onset 2013 w forgetfulness, word finding  difficulty w assoc anxiety and depression.  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 per Dr Reese  Subjective      Past Medical History:   Past Medical History:   Diagnosis Date    Acid reflux     Anxiety     Chronic cerebral ischemia     Depression     Frequent UTI     JOSE (generalized anxiety disorder)     Herpes simplex     HTN (hypertension)     Hypothyroidism     Memory loss     Ovarian cyst     Peripheral edema        Past Surgical History:   Past Surgical History:   Procedure Laterality Date    COLONOSCOPY W/ BIOPSIES  2016    OVARIAN CYST REMOVAL      TOTAL KNEE ARTHROPLASTY Bilateral ,        Family History:   Family History   Problem Relation Age of Onset    Stroke Father        Social History:   Social History     Socioeconomic History    Marital status:      Spouse name: Janie    Number of children: 2   Tobacco Use    Smoking status: Former     Current packs/day: 0.00     Average packs/day: 0.3 packs/day for 5.0 years (1.3 ttl pk-yrs)     Types: Cigarettes     Start date:      Quit date:      Years since quittin.3     Passive exposure: Past    Smokeless tobacco: Never   Vaping Use    Vaping status: Never Used   Substance and Sexual Activity    Alcohol use: No    Drug use: Never    Sexual activity: Defer       Medications:     Current Outpatient Medications:     amLODIPine (NORVASC) 2.5 MG tablet, TAKE 1 TABLET DAILY, Disp: 90 tablet, Rfl: 3    Apoaequorin (Prevagen) 10 MG capsule, Take 20 mg by mouth Daily., Disp: , Rfl:     AZO-CRANBERRY PO, Take  by mouth Daily., Disp: , Rfl:     cinacalcet (SENSIPAR) 30 MG tablet, TAKE 1 TABLET TWICE A DAY, Disp: 60 tablet, Rfl: 0    Cobalamin Combinations (Neuriva Plus) capsule, Take  by mouth., Disp: , Rfl:     cyanocobalamin (VITAMIN B-12) 500 MCG tablet,  Take 2 tablets by mouth Daily., Disp: , Rfl:     D-Mannose 500 MG capsule, Take  by mouth 3 (Three) Times a Day., Disp: , Rfl:     donepezil (ARICEPT) 10 MG tablet, TAKE 1 TABLET DAILY, Disp: 30 tablet, Rfl: 1    escitalopram (LEXAPRO) 10 MG tablet, Take 1 tablet by mouth Every Morning., Disp: 90 tablet, Rfl: 3    Lactobacillus-Inulin (PROBIOTIC DIGESTIVE SUPPORT PO), Take  by mouth Daily., Disp: , Rfl:     levothyroxine (SYNTHROID, LEVOTHROID) 75 MCG tablet, Take 1 tablet by mouth Every Morning., Disp: 90 tablet, Rfl: 3    memantine (NAMENDA) 10 MG tablet, TAKE 1 TABLET TWICE A DAY, Disp: 180 tablet, Rfl: 3    metoprolol succinate XL (TOPROL-XL) 25 MG 24 hr tablet, TAKE ONE-HALF (1/2) TABLET EVERY NIGHT, Disp: 45 tablet, Rfl: 0    Mirabegron ER (MYRBETRIQ) 50 MG tablet sustained-release 24 hour 24 hr tablet, TAKE 1 TABLET DAILY, Disp: 90 tablet, Rfl: 3    Multiple Vitamins-Minerals (PRESERVISION AREDS 2 PO), Take  by mouth Daily., Disp: , Rfl:     valACYclovir (VALTREX) 1000 MG tablet, Take 1 tablet by mouth., Disp: , Rfl:     Allergies:   Allergies   Allergen Reactions    Erythromycin Unknown - Low Severity     unknown    Penicillins        PHQ-9 Total Score:     HUMA Fall Risk Assessment was completed, and patient is at HIGH risk for falls. Assessment completed on:5/12/2025    Objective     Physical Exam:   Physical Exam  Eyes:      Extraocular Movements: No nystagmus.   Skin:     Comments: Right ant pre-tibial area with 1x2cm black scab. No obvious sings of infection.   Neurological:      Coordination: Coordination is intact.      Deep Tendon Reflexes:      Reflex Scores:       Bicep reflexes are 1+ on the right side and 1+ on the left side.       Brachioradialis reflexes are 1+ on the right side and 1+ on the left side.       Patellar reflexes are 1+ on the right side and 1+ on the left side.       Achilles reflexes are 1+ on the right side and 1+ on the left side.  Psychiatric:         Speech: Speech normal.  "        Neurological Exam  Mental Status   Speech is normal. Follows one-step commands. Attention and concentration are normal. Fund of knowledge is appropriate for level of education.  No head trauma on today's exam.    Cranial Nerves  CN III, IV, VI: No nystagmus. Normal saccades. Normal smooth pursuit.   Right pupil: Round.   Left pupil: Round.  CN V: Facial sensation is normal.  CN VII: Full and symmetric facial movement.    Motor  Normal muscle bulk throughout. No fasciculations present. Normal muscle tone. No abnormal involuntary movements.  Generalized weakness. Struggles to stand on her own. Using wheelchair.    Sensory  Sensation is intact to light touch, pinprick, vibration and proprioception in all four extremities.    Reflexes                                            Right                      Left  Brachioradialis                    1+                         1+  Biceps                                 1+                         1+  Patellar                                1+                         1+  Achilles                                1+                         1+    Coordination    Finger-to-nose, rapid alternating movements and heel-to-shin normal bilaterally without dysmetria.    Gait  Casual gait:  Pt needs assist x 2 to walk even short distances. Weak and unsteady on her feet..     Physical Exam        Vital Signs:   Vitals:    05/12/25 0918   BP: 118/68   Pulse: 64   SpO2: 98%   Weight: 60.5 kg (133 lb 6.4 oz)   Height: 162.6 cm (64.02\")     Body mass index is 22.89 kg/m².         Assessment / Plan      Assessment/Plan:   Diagnoses and all orders for this visit:    1. Alzheimer's disease (Primary)  Comments:  Cont Namenda and aricept    2. Gait disorder  Comments:  Pt needs rehab for gait, balance and strength training. Frequent falls.    3. Self-care deficit  Comments:   is unable to care for her at home due to limited cognition and limited use of both arms.    4. Frequent " falls    5. Open wound of skin  Comments:  Clean with soap and water daily. Apply vaseline.       Assessment & Plan      Due to multiple falls, lack of adequate care in the home with bathing, feeding, dressing changes. Pt weakness and worsening dementia I recommend inpatient rehab for pt.    Patient Education:       Reviewed medications, potential side effects and signs and symptoms to report. Discussed risk versus benefits of treatment plan with patient and/or family-including medications, labs and radiology that may be ordered. Addressed questions and concerns during visit. Patient and/or family verbalized understanding and agree with plan. Instructed to call the office with any questions and report to ER with any life-threatening symptoms.     Follow Up:   Return in about 6 months (around 11/12/2025) for Recheck.    During this visit the following were done:  Labs Reviewed []    Labs Ordered []    Radiology Reports Reviewed []    Radiology Ordered []    PCP Records Reviewed []    Referring Provider Records Reviewed []    ER Records Reviewed []    Hospital Records Reviewed []    History Obtained From Family []    Radiology Images Reviewed []    Other Reviewed []    Records Requested []      Patient or patient representative verbalized consent for the use of Ambient Listening during the visit with  Reagan Corral DNP, APRN for chart documentation. 5/12/2025  09:52 EDT      Reagan Corral DNP, APRN

## 2025-05-13 ENCOUNTER — TELEPHONE (OUTPATIENT)
Dept: NEUROLOGY | Facility: CLINIC | Age: 85
End: 2025-05-13
Payer: MEDICARE

## 2025-05-13 DIAGNOSIS — G30.9 ALZHEIMER'S DISEASE: Primary | ICD-10-CM

## 2025-05-13 DIAGNOSIS — F02.80 ALZHEIMER'S DISEASE: Primary | ICD-10-CM

## 2025-05-13 RX ORDER — ARIPIPRAZOLE 5 MG/1
5 TABLET ORAL DAILY
Qty: 30 TABLET | Refills: 5 | Status: SHIPPED | OUTPATIENT
Start: 2025-05-13

## 2025-05-13 NOTE — TELEPHONE ENCOUNTER
Caller: Indy Siddiqui    Relationship: Emergency Contact    Best call back number:   Telephone Information:   Mobile [phone]       Which medication are you concerned about: JESE    Who prescribed you this medication: GABE LOMELI    What are your concerns: PATIENT'S DAUGHTER WAS CALLING IN TO SEE WHEN MEDICATION CAN BE PICKED UP SINCE THE PHARMACY WAS NOT SEEING IT YET. NOT SEEING THIS LISTED UNDER HER MEDS IN [facility]. PLEASE ADVISE IF THIS SHOULD BE SENT INTO THE PHARMACY    PLEASE REVIEW AND ADIVSE

## 2025-05-14 ENCOUNTER — TELEPHONE (OUTPATIENT)
Dept: NEUROLOGY | Facility: CLINIC | Age: 85
End: 2025-05-14
Payer: MEDICARE

## 2025-05-14 NOTE — TELEPHONE ENCOUNTER
Talked to the patient and asked how the process was going for [facility]. She told me she had cancelled that and isn't leaving home. I told her that she really needed to go because her  said that he was struggling to take care of her at home. She said that they were doing fine and that he does whatever she needs for her. I told her the issue is safety with her staying home and that he can't keep that up and that he already said he had trouble and he was going to be gone more this Summer. I told her if she isn't going anywhere then she has to let people in the home, like PT and someone to stay with her when her  is gone. She said she can go with him and I mentioned if she would go everywhere and she said I don't know. We talked about her  only being able to use one arm and that if he hurts himself what will she do then. She stated she would call 911 and then when asked if he couldn't come back home she said she would call 911 to help her. I told her they wouldn't stay with her. I told her if she fell again she could end up in the hospital and having to go to rehab away from home anyways. She told me that she hasn't fallen for a while when she did not that long ago and didn't even realize she hit her head. She said she would consider it.

## 2025-05-14 NOTE — TELEPHONE ENCOUNTER
Indy called me to let me know that their mother is refusing to go to [facility] now. She called and yelled at both her daughters that she doesn't want to go and she won't. Tatum went over some suggestions with Indy to try and I am going to call the patient myself and try to convince her. I told the daughter that if she doesn't go then we would at least need PT and some one to help coming into the house. I will call the patient and then talk to Indy again. I did receive paperwork from the daughters yesterday for [facility] in order to get her admitted. They completed the tour, did the assessment and was found eligible for assisted living.

## 2025-05-14 NOTE — TELEPHONE ENCOUNTER
Relayed to Indy how her mother reacted with me. I talked through the suggestions mentioned and that I will call her again to keep asking her to change her mind. I've also asked that the  make it clear to the wife that he having trouble taking care of her and needs help.

## 2025-06-16 DIAGNOSIS — I49.1 PAC (PREMATURE ATRIAL CONTRACTION): ICD-10-CM

## 2025-06-16 DIAGNOSIS — I10 ESSENTIAL HYPERTENSION: ICD-10-CM

## 2025-06-16 RX ORDER — METOPROLOL SUCCINATE 25 MG/1
12.5 TABLET, EXTENDED RELEASE ORAL NIGHTLY
Qty: 45 TABLET | Refills: 1 | Status: SHIPPED | OUTPATIENT
Start: 2025-06-16

## 2025-06-30 RX ORDER — AMLODIPINE BESYLATE 2.5 MG/1
2.5 TABLET ORAL DAILY
Qty: 90 TABLET | Refills: 3 | Status: SHIPPED | OUTPATIENT
Start: 2025-06-30

## 2025-07-17 DIAGNOSIS — E21.0 PRIMARY HYPERPARATHYROIDISM: ICD-10-CM

## 2025-07-17 RX ORDER — CINACALCET 30 MG/1
30 TABLET, FILM COATED ORAL 2 TIMES DAILY
Qty: 180 TABLET | Refills: 0 | Status: SHIPPED | OUTPATIENT
Start: 2025-07-17

## 2025-07-17 RX ORDER — CINACALCET 30 MG/1
30 TABLET, FILM COATED ORAL 2 TIMES DAILY
Qty: 14 TABLET | Refills: 0 | Status: SHIPPED | OUTPATIENT
Start: 2025-07-17 | End: 2025-07-17 | Stop reason: SDUPTHER

## 2025-07-17 NOTE — TELEPHONE ENCOUNTER
Patients daughter Indy called office, stated that patient is completely out of sensipar. Daughter stated that his is crucial med for patient. Patient has an apt in August. Daughter wanting to know if a weeks worth of script can be called into [facility] on [address] in [facility] and then a 3 month supply called into [facility]. Daughter would like a call back if this can be done.

## 2025-08-25 ENCOUNTER — OFFICE VISIT (OUTPATIENT)
Dept: ENDOCRINOLOGY | Facility: CLINIC | Age: 85
End: 2025-08-25
Payer: MEDICARE

## 2025-08-25 VITALS
BODY MASS INDEX: 22.33 KG/M2 | SYSTOLIC BLOOD PRESSURE: 134 MMHG | HEIGHT: 64 IN | DIASTOLIC BLOOD PRESSURE: 60 MMHG | WEIGHT: 130.8 LBS | HEART RATE: 62 BPM | OXYGEN SATURATION: 96 %

## 2025-08-25 DIAGNOSIS — E04.1 SOLITARY THYROID NODULE: ICD-10-CM

## 2025-08-25 DIAGNOSIS — E21.0 PRIMARY HYPERPARATHYROIDISM: Primary | ICD-10-CM

## 2025-08-25 PROCEDURE — 82330 ASSAY OF CALCIUM: CPT | Performed by: INTERNAL MEDICINE

## 2025-08-25 PROCEDURE — 3075F SYST BP GE 130 - 139MM HG: CPT | Performed by: INTERNAL MEDICINE

## 2025-08-25 PROCEDURE — 99213 OFFICE O/P EST LOW 20 MIN: CPT | Performed by: INTERNAL MEDICINE

## 2025-08-25 PROCEDURE — 1160F RVW MEDS BY RX/DR IN RCRD: CPT | Performed by: INTERNAL MEDICINE

## 2025-08-25 PROCEDURE — 83970 ASSAY OF PARATHORMONE: CPT | Performed by: INTERNAL MEDICINE

## 2025-08-25 PROCEDURE — 3078F DIAST BP <80 MM HG: CPT | Performed by: INTERNAL MEDICINE

## 2025-08-25 PROCEDURE — 1159F MED LIST DOCD IN RCRD: CPT | Performed by: INTERNAL MEDICINE

## 2025-08-25 PROCEDURE — 80069 RENAL FUNCTION PANEL: CPT | Performed by: INTERNAL MEDICINE

## 2025-08-25 PROCEDURE — 36415 COLL VENOUS BLD VENIPUNCTURE: CPT | Performed by: INTERNAL MEDICINE

## 2025-08-26 DIAGNOSIS — E21.0 PRIMARY HYPERPARATHYROIDISM: ICD-10-CM

## 2025-08-26 LAB
ALBUMIN SERPL-MCNC: 3.7 G/DL (ref 3.5–5.2)
ANION GAP SERPL CALCULATED.3IONS-SCNC: 12 MMOL/L (ref 5–15)
BUN SERPL-MCNC: 18 MG/DL (ref 8–23)
BUN/CREAT SERPL: 13.6 (ref 7–25)
CA-I SERPL ISE-MCNC: 1.34 MMOL/L (ref 1.15–1.35)
CALCIUM SPEC-SCNC: 10.4 MG/DL (ref 8.6–10.5)
CHLORIDE SERPL-SCNC: 109 MMOL/L (ref 98–107)
CO2 SERPL-SCNC: 23 MMOL/L (ref 22–29)
CREAT SERPL-MCNC: 1.32 MG/DL (ref 0.57–1)
EGFRCR SERPLBLD CKD-EPI 2021: 39.6 ML/MIN/1.73
GLUCOSE SERPL-MCNC: 73 MG/DL (ref 65–99)
PHOSPHATE SERPL-MCNC: 3.7 MG/DL (ref 2.5–4.5)
POTASSIUM SERPL-SCNC: 4.5 MMOL/L (ref 3.5–5.2)
PTH-INTACT SERPL-MCNC: 72.3 PG/ML (ref 15–65)
SODIUM SERPL-SCNC: 144 MMOL/L (ref 136–145)

## 2025-08-26 RX ORDER — CINACALCET 30 MG/1
30 TABLET, FILM COATED ORAL 2 TIMES DAILY
Qty: 180 TABLET | Refills: 3 | Status: SHIPPED | OUTPATIENT
Start: 2025-08-26